# Patient Record
Sex: FEMALE | Race: WHITE | NOT HISPANIC OR LATINO | Employment: FULL TIME | URBAN - METROPOLITAN AREA
[De-identification: names, ages, dates, MRNs, and addresses within clinical notes are randomized per-mention and may not be internally consistent; named-entity substitution may affect disease eponyms.]

---

## 2018-04-25 ENCOUNTER — COMMUNICATION - HEALTHEAST (OUTPATIENT)
Dept: SURGERY | Facility: CLINIC | Age: 53
End: 2018-04-25

## 2018-06-14 ENCOUNTER — HOSPITAL ENCOUNTER (OUTPATIENT)
Dept: MAMMOGRAPHY | Facility: CLINIC | Age: 53
Discharge: HOME | End: 2018-06-14
Attending: SPECIALIST

## 2018-06-14 DIAGNOSIS — Z12.31 VISIT FOR SCREENING MAMMOGRAM: ICD-10-CM

## 2018-07-27 ENCOUNTER — RECORDS - HEALTHEAST (OUTPATIENT)
Dept: ADMINISTRATIVE | Facility: OTHER | Age: 53
End: 2018-07-27

## 2018-07-27 ENCOUNTER — AMBULATORY - HEALTHEAST (OUTPATIENT)
Dept: SCHEDULING | Facility: CLINIC | Age: 53
End: 2018-07-27

## 2018-07-27 DIAGNOSIS — Z13.820 OSTEOPOROSIS SCREENING: ICD-10-CM

## 2018-08-15 ENCOUNTER — RECORDS - HEALTHEAST (OUTPATIENT)
Dept: ADMINISTRATIVE | Facility: OTHER | Age: 53
End: 2018-08-15

## 2018-08-16 ENCOUNTER — RECORDS - HEALTHEAST (OUTPATIENT)
Dept: ADMINISTRATIVE | Facility: OTHER | Age: 53
End: 2018-08-16

## 2018-12-18 ENCOUNTER — COMMUNICATION - HEALTHEAST (OUTPATIENT)
Dept: SURGERY | Facility: CLINIC | Age: 53
End: 2018-12-18

## 2019-03-15 ENCOUNTER — AMBULATORY - HEALTHEAST (OUTPATIENT)
Dept: SURGERY | Facility: CLINIC | Age: 54
End: 2019-03-15

## 2019-06-18 ENCOUNTER — HOSPITAL ENCOUNTER (OUTPATIENT)
Dept: MAMMOGRAPHY | Facility: CLINIC | Age: 54
Discharge: HOME | End: 2019-06-18
Attending: SPECIALIST

## 2019-06-18 DIAGNOSIS — Z12.31 VISIT FOR SCREENING MAMMOGRAM: ICD-10-CM

## 2020-04-01 ENCOUNTER — OFFICE VISIT - HEALTHEAST (OUTPATIENT)
Dept: SURGERY | Facility: CLINIC | Age: 55
End: 2020-04-01

## 2020-04-03 ENCOUNTER — OFFICE VISIT - HEALTHEAST (OUTPATIENT)
Dept: SURGERY | Facility: CLINIC | Age: 55
End: 2020-04-03

## 2020-04-13 ENCOUNTER — OFFICE VISIT - HEALTHEAST (OUTPATIENT)
Dept: SURGERY | Facility: CLINIC | Age: 55
End: 2020-04-13

## 2020-04-13 DIAGNOSIS — Z48.89 POSTOPERATIVE VISIT: ICD-10-CM

## 2020-07-28 ENCOUNTER — RECORDS - HEALTHEAST (OUTPATIENT)
Dept: ADMINISTRATIVE | Facility: OTHER | Age: 55
End: 2020-07-28

## 2020-07-28 ENCOUNTER — AMBULATORY - HEALTHEAST (OUTPATIENT)
Dept: SCHEDULING | Facility: CLINIC | Age: 55
End: 2020-07-28

## 2020-07-28 ENCOUNTER — AMBULATORY - HEALTHEAST (OUTPATIENT)
Dept: SURGERY | Facility: CLINIC | Age: 55
End: 2020-07-28

## 2020-07-28 DIAGNOSIS — Z13.1 SCREENING FOR DIABETES MELLITUS: ICD-10-CM

## 2020-07-28 DIAGNOSIS — M81.0 OSTEOPOROSIS, POST-MENOPAUSAL: ICD-10-CM

## 2020-07-28 DIAGNOSIS — M81.0 OSTEOPOROSIS, UNSPECIFIED OSTEOPOROSIS TYPE, UNSPECIFIED PATHOLOGICAL FRACTURE PRESENCE: ICD-10-CM

## 2020-07-28 DIAGNOSIS — Z13.220 SCREENING FOR LIPOID DISORDERS: ICD-10-CM

## 2020-07-28 DIAGNOSIS — D56.3 THALASSEMIA MINOR: ICD-10-CM

## 2020-07-29 ENCOUNTER — AMBULATORY - HEALTHEAST (OUTPATIENT)
Dept: LAB | Facility: CLINIC | Age: 55
End: 2020-07-29

## 2020-07-29 DIAGNOSIS — D56.3 THALASSEMIA MINOR: ICD-10-CM

## 2020-07-29 DIAGNOSIS — M81.0 OSTEOPOROSIS, POST-MENOPAUSAL: ICD-10-CM

## 2020-07-29 DIAGNOSIS — Z13.1 SCREENING FOR DIABETES MELLITUS: ICD-10-CM

## 2020-07-29 DIAGNOSIS — Z13.220 SCREENING FOR LIPOID DISORDERS: ICD-10-CM

## 2020-07-29 LAB
CHOLEST SERPL-MCNC: 168 MG/DL
ERYTHROCYTE [DISTWIDTH] IN BLOOD BY AUTOMATED COUNT: 14.9 % (ref 11–14.5)
FASTING STATUS PATIENT QL REPORTED: YES
FASTING STATUS PATIENT QL REPORTED: YES
GLUCOSE BLD-MCNC: 81 MG/DL (ref 70–125)
HCT VFR BLD AUTO: 36.8 % (ref 35–47)
HDLC SERPL-MCNC: 56 MG/DL
HGB BLD-MCNC: 11.9 G/DL (ref 12–16)
LDLC SERPL CALC-MCNC: 100 MG/DL
MCH RBC QN AUTO: 21.1 PG (ref 27–34)
MCHC RBC AUTO-ENTMCNC: 32.3 G/DL (ref 32–36)
MCV RBC AUTO: 66 FL (ref 80–100)
PLATELET # BLD AUTO: 180 THOU/UL (ref 140–440)
PMV BLD AUTO: 10.1 FL (ref 7–10)
RBC # BLD AUTO: 5.61 MILL/UL (ref 3.8–5.4)
TRIGL SERPL-MCNC: 61 MG/DL
WBC: 4.1 THOU/UL (ref 4–11)

## 2020-07-30 LAB — 25(OH)D3 SERPL-MCNC: 46.7 NG/ML (ref 30–80)

## 2020-08-13 ENCOUNTER — HOSPITAL ENCOUNTER (OUTPATIENT)
Dept: MAMMOGRAPHY | Facility: CLINIC | Age: 55
Discharge: HOME | End: 2020-08-13
Attending: FAMILY MEDICINE

## 2020-08-13 DIAGNOSIS — Z12.31 VISIT FOR SCREENING MAMMOGRAM: ICD-10-CM

## 2020-12-17 ENCOUNTER — AMBULATORY - HEALTHEAST (OUTPATIENT)
Dept: SURGERY | Facility: CLINIC | Age: 55
End: 2020-12-17

## 2020-12-17 DIAGNOSIS — Z48.89 POSTOPERATIVE VISIT: ICD-10-CM

## 2020-12-22 ENCOUNTER — AMBULATORY - HEALTHEAST (OUTPATIENT)
Dept: LAB | Facility: CLINIC | Age: 55
End: 2020-12-22

## 2020-12-22 DIAGNOSIS — Z48.89 POSTOPERATIVE VISIT: ICD-10-CM

## 2020-12-22 LAB — HGB BLD-MCNC: 12 G/DL (ref 12–16)

## 2021-05-27 ENCOUNTER — RECORDS - HEALTHEAST (OUTPATIENT)
Dept: ADMINISTRATIVE | Facility: CLINIC | Age: 56
End: 2021-05-27

## 2021-05-28 ENCOUNTER — RECORDS - HEALTHEAST (OUTPATIENT)
Dept: ADMINISTRATIVE | Facility: CLINIC | Age: 56
End: 2021-05-28

## 2021-07-13 ENCOUNTER — RECORDS - HEALTHEAST (OUTPATIENT)
Dept: ADMINISTRATIVE | Facility: CLINIC | Age: 56
End: 2021-07-13

## 2021-07-14 ENCOUNTER — TELEPHONE (OUTPATIENT)
Dept: SURGERY | Facility: CLINIC | Age: 56
End: 2021-07-14

## 2021-07-21 ENCOUNTER — RECORDS - HEALTHEAST (OUTPATIENT)
Dept: ADMINISTRATIVE | Facility: CLINIC | Age: 56
End: 2021-07-21

## 2021-08-12 ENCOUNTER — E-VISIT (OUTPATIENT)
Dept: URGENT CARE | Facility: CLINIC | Age: 56
End: 2021-08-12
Payer: COMMERCIAL

## 2021-08-12 DIAGNOSIS — Z20.822 SUSPECTED COVID-19 VIRUS INFECTION: ICD-10-CM

## 2021-08-12 DIAGNOSIS — Z20.822 SUSPECTED COVID-19 VIRUS INFECTION: Primary | ICD-10-CM

## 2021-08-12 LAB — SARS-COV-2 RNA RESP QL NAA+PROBE: NEGATIVE

## 2021-08-12 PROCEDURE — U0003 INFECTIOUS AGENT DETECTION BY NUCLEIC ACID (DNA OR RNA); SEVERE ACUTE RESPIRATORY SYNDROME CORONAVIRUS 2 (SARS-COV-2) (CORONAVIRUS DISEASE [COVID-19]), AMPLIFIED PROBE TECHNIQUE, MAKING USE OF HIGH THROUGHPUT TECHNOLOGIES AS DESCRIBED BY CMS-2020-01-R: HCPCS

## 2021-08-12 PROCEDURE — U0005 INFEC AGEN DETEC AMPLI PROBE: HCPCS

## 2021-08-12 PROCEDURE — 99421 OL DIG E/M SVC 5-10 MIN: CPT | Performed by: PHYSICIAN ASSISTANT

## 2021-08-12 NOTE — PATIENT INSTRUCTIONS
Dear Emilia Mccormick,    Your symptoms show that you may have coronavirus (COVID-19). This illness can cause fever, cough and trouble breathing. Many people get a mild case and get better on their own. Some people can get very sick.    Will I be tested for COVID-19?  We would like to test you for Covid-19 virus. I have placed orders for this test.     For all employees or close contacts (except [place] and [facility] - see below), go to your [employer] home page and scroll down to the section that says  You have an appointment that needs to be scheduled  and click the large green button that says  Schedule Now  and follow the steps to find the next available opening.     If you are unable to complete these steps or if you cannot find any available times, please call [phone] to schedule employee testing.     [place] employees or close contacts, please call [phone].   [place] ([facility]) employees or close contacts call [phone].    Return to work/school/ guidance:  Please let your workplace manager and staffing office know when your quarantine ends     We can t give you an exact date as it depends on the above. You can calculate this on your own or work with your manager/staffing office to calculate this. (For example if you were exposed on 10/4, you would have to quarantine for 14 full days. That would be through 10/18. You could return on 10/19.)      If you receive a positive COVID-19 test result, follow the guidance of the those who are giving you the results. Usually the return to work is 10 (or in some cases 20 days from symptom onset.) If you work at [employer], you must also be cleared by Employee Occupational Health and Safety to return to work.        If you receive a negative COVID-19 test result and did not have a high risk exposure to someone with a known positive COVID-19 test, you can return to work once you're free of fever for 24 hours without fever-reducing  medication and your symptoms are improving or resolved.      If you receive a negative COVID-19 test and If you had a high risk exposure to someone who has tested positive for COVID-19 then you can return to work 14 days after your last contact with the positive individual    Note: If you have ongoing exposure to the covid positive person, this quarantine period may be more than 14 days. (For example, if you are continued to be exposed to your child who tested positive and cannot isolate from them, then the quarantine of 7-14 days can't start until your child is no longer contagious. This is typically 10 days from onset of the child's symptoms. So the total duration may be 17-24 days in this case.)    Sign up for [employer].   We know it's scary to hear that you might have COVID-19. We want to track your symptoms to make sure you're okay over the next 2 weeks. Please look for an email from [employer]--this is a free, online program that we'll use to keep in touch. To sign up, follow the link in the email you will receive. Learn more at [email or web address]    How can I take care of myself?    Get lots of rest. Drink extra fluids (unless a doctor has told you not to)    Take Tylenol (acetaminophen) or ibuprofen for fever or pain. If you have liver or kidney problems, ask your family doctor if it's okay to take Tylenol o ibuprofen    If you have other health problems (like cancer, heart failure, an organ transplant or severe kidney disease): Call your specialty clinic if you don't feel better in the next 2 days.    Know when to call 911. Emergency warning signs include:  o Trouble breathing or shortness of breath  o Pain or pressure in the chest that doesn't go away  o Feeling confused like you haven't felt before, or not being able to wake up  o Bluish-colored lips or face    Where can I get more information?  [facility] - About COVID-19:   [email or web address]    CDC - What to Do  If You're Sick:   www.cdc.gov/coronavirus/2019-ncov/about/steps-when-sick.html

## 2021-08-21 ENCOUNTER — HEALTH MAINTENANCE LETTER (OUTPATIENT)
Age: 56
End: 2021-08-21

## 2021-10-16 ENCOUNTER — HEALTH MAINTENANCE LETTER (OUTPATIENT)
Age: 56
End: 2021-10-16

## 2021-12-26 ENCOUNTER — E-VISIT (OUTPATIENT)
Dept: URGENT CARE | Facility: CLINIC | Age: 56
End: 2021-12-26
Payer: COMMERCIAL

## 2021-12-26 DIAGNOSIS — Z20.822 CLOSE EXPOSURE TO 2019 NOVEL CORONAVIRUS: Primary | ICD-10-CM

## 2021-12-26 PROCEDURE — 99421 OL DIG E/M SVC 5-10 MIN: CPT | Performed by: PHYSICIAN ASSISTANT

## 2021-12-28 ENCOUNTER — LAB (OUTPATIENT)
Dept: FAMILY MEDICINE | Facility: CLINIC | Age: 56
End: 2021-12-28
Attending: PHYSICIAN ASSISTANT
Payer: COMMERCIAL

## 2021-12-28 DIAGNOSIS — Z20.822 CLOSE EXPOSURE TO 2019 NOVEL CORONAVIRUS: ICD-10-CM

## 2021-12-28 LAB — SARS-COV-2 RNA RESP QL NAA+PROBE: NEGATIVE

## 2021-12-28 PROCEDURE — U0003 INFECTIOUS AGENT DETECTION BY NUCLEIC ACID (DNA OR RNA); SEVERE ACUTE RESPIRATORY SYNDROME CORONAVIRUS 2 (SARS-COV-2) (CORONAVIRUS DISEASE [COVID-19]), AMPLIFIED PROBE TECHNIQUE, MAKING USE OF HIGH THROUGHPUT TECHNOLOGIES AS DESCRIBED BY CMS-2020-01-R: HCPCS

## 2021-12-28 PROCEDURE — U0005 INFEC AGEN DETEC AMPLI PROBE: HCPCS

## 2022-01-14 ENCOUNTER — LAB REQUISITION (OUTPATIENT)
Dept: LAB | Facility: HOSPITAL | Age: 57
End: 2022-01-14

## 2022-01-14 PROCEDURE — U0003 INFECTIOUS AGENT DETECTION BY NUCLEIC ACID (DNA OR RNA); SEVERE ACUTE RESPIRATORY SYNDROME CORONAVIRUS 2 (SARS-COV-2) (CORONAVIRUS DISEASE [COVID-19]), AMPLIFIED PROBE TECHNIQUE, MAKING USE OF HIGH THROUGHPUT TECHNOLOGIES AS DESCRIBED BY CMS-2020-01-R: HCPCS | Performed by: INTERNAL MEDICINE

## 2022-01-15 LAB — SARS-COV-2 RNA RESP QL NAA+PROBE: POSITIVE

## 2022-01-26 NOTE — PROGRESS NOTES
ASSESSMENT: Emilia Mccormick is a 56 year old female with past medical history significant for osteoporosis who presents today for new patient evaluation of chronic left neck pain without radicular symptoms.  Patient has not had any imaging of the cervical spine.  Suspect she may have left upper cervical facet arthropathy.  Pain has been persistent despite conservative treatment including NSAIDs, massage, and working with the stretch lab.  She is neurologically intact.  -Patient also complains of positional dizziness which sounds most consistent with BPPV.  Suspect this is unrelated to the cervical spine pain.      PLAN:  A shared decision making model was used.  The patient's values and choices were respected.  The following represents what was discussed and decided upon by the physician assistant and the patient.      1.  DIAGNOSTIC TESTS: I ordered an MRI cervical spine for further evaluation.  Pain has been present for about 6 months and persists despite conservative treatment including NSAIDs, massage, stretch lab therapy.    2.  PHYSICAL THERAPY:    -I entered a referral to occupational therapy for her vertigo.  -This patient will likely also benefit from a referral to physical therapy for her neck pain.  I think she would be a good candidate for [facility].  We will await the results of her MRI first.    3.  MEDICATIONS: No changes are made to the patient's medications.  She uses ibuprofen rarely for pain.  She does not feel that she needs any additional pain relieving medications.    4.  INTERVENTIONS: No interventions were ordered.  Patient may benefit from interventional pain management if she fails to improve with conservative treatment, depending on the results of an MRI.    5.  PATIENT EDUCATION: Patient is in agreement the above plan.  All questions were answered.    6.  FOLLOW-UP:   I will send the patient a [employer] message with her MRI results.  At that time I will likely recommend [facility] physical  therapy versus returning to the clinic to review the results and discuss treatment options.  If she has questions or concerns in the meantime, she should not hesitate to call.        SUBJECTIVE:  Emilia Mccormick  Is a 56 year old female who presents today for new patient evaluation of neck pain.  Patient reports that she began to have neck pain about 6 months ago.  She denies any specific injury or event to cause the neck pain.  Patient is an avid weight  for the past 10 years.  She assumed the pain began because she does not always stretch as much as she should.  The pain first began while she was driving.  She could not turn her neck.  The pain persisted so she started getting massage which was helpful.  For the past 1 month she has been seeing the stress lab which has helped significantly to improve her range of motion, but pain persists.  Patient would like to find out what is wrong so that she does not injure herself further while she continues to lift weights.    Patient complains of left-sided neck pain.  Pain is located in the upper cervical spine.  She denies any headaches associated with the pain.  She denies any pain radiating into the shoulders or down the arms.  Pain is aggravated movement, especially with turning her neck to the right.  Pain is alleviated with stretching and applying heat.  She denies any numbness, tingling, weakness down the arms.  Denies loss of bowel or bladder control.  Denies any recent fevers, chills, sweats.    Patient also complains of dizziness.  She thinks this began at around the same time as her neck pain.  She states the dizziness is positional.  It occurs when rolling over and sometimes when changing positions during weight lifting (getting up and down off of the bench).  She states it feels like the room is spinning.  It resolves if she sits still for a moment.  She denies nausea or vomiting associated with the dizziness.    As mentioned above, patient has  tried massage and working with the stretch lab.  She has had chiropractic treatment 2 years ago (not for this pain).  She has never had any spine surgeries or spine injections.  She did have a shoulder injection for bursitis several years ago which was very helpful.  She takes Advil rarely (more for headache).  She is not sure if it helps with her neck pain.    Current Outpatient Medications   Medication     calcium carbonate-vitamin D2 500 mg(1,250mg) -200 unit tablet     multivitamin (ONE A DAY) per tablet         Allergies   Allergen Reactions     Lexapro [Escitalopram] Unknown     Penicillins Unknown       Past Medical History:   Diagnosis Date     Kidney stone      Osteoporosis      Thalassemia         Patient Active Problem List   Diagnosis     Idiopathic osteoporosis     Calculus of kidney       Past Surgical History:   Procedure Laterality Date     ENDOMETRIAL ABLATION       THYROID SURGERY  2000    lobectomy     TUBAL LIGATION       URETEROSCOPY         Family History   Problem Relation Age of Onset     Breast Cancer Paternal Aunt 70.00     Cancer Father         skin     Heart Disease Father      Cancer Paternal Grandfather         lung     Urolithiasis No family hx of      Clotting Disorder No family hx of      Diabetes No family hx of      Gout No family hx of        Social history: Patient is .  She works as a manager for [employer].  She uses nicotine replacement lozenges.  She drinks alcohol about once per week.  Denies illicit drug use.    ROS: Positive for joint pain, itching, dizziness, insomnia.  Specifically negative for dysphagia, imbalance, fine motor skill difficulties, bowel/bladder dysfunction, fevers,chills, appetite changes, unexplained weight loss.   Otherwise 13 systems reviewed are negative.  Please see the patient's intake questionnaire from today for details.      OBJECTIVE:  PHYSICAL EXAMINATION:  CONSTITUTIONAL:  Vital signs as above.  No acute  distress.  The patient is well nourished and well groomed.  PSYCHIATRIC:  The patient is awake, alert, oriented to person, place, time and answering questions appropriately with clear speech.    HEENT:  Normocephalic, atraumatic.  Sclera clear.    SKIN:  Skin over the face, bilateral upper extremities, and neck is clean, dry, intact without rashes.  LYMPH NODES:  No palpable or tender anterior/posterior cervical, submandibular, or supraclavicular lymph nodes.    MUSCLE STRENGTH:  5/5 strength for the bilateral shoulder abductors, elbow flexors/extensors, wrist extensors, finger flexors/abductors.  NEURO:  CN III-XII are grossly intact.  2/4 symmetric biceps, brachioradialis, triceps reflexes bilaterally.  Sensation to light touch intact bilateral upper extremities throughout.  Negative Paula's bilaterally.    VASCULAR:  2/4 radial pulses bilaterally.  Warm upper limbs bilaterally.    MUSCULOSKELETAL: Tenderness to palpation left cervical paraspinous muscles at approximately C2 with some hypertonicity.  No significant tenderness palpation left occipital nerve.  Cervical range of motion is intact with flexion and extension.  Moderately restricted lateral rotation right, mildly restricted lateral rotation left.

## 2022-01-28 ENCOUNTER — OFFICE VISIT (OUTPATIENT)
Dept: PHYSICAL MEDICINE AND REHAB | Facility: CLINIC | Age: 57
End: 2022-01-28
Payer: COMMERCIAL

## 2022-01-28 VITALS
TEMPERATURE: 98.2 F | DIASTOLIC BLOOD PRESSURE: 74 MMHG | SYSTOLIC BLOOD PRESSURE: 113 MMHG | WEIGHT: 119.4 LBS | BODY MASS INDEX: 23.44 KG/M2 | HEIGHT: 60 IN | HEART RATE: 73 BPM

## 2022-01-28 DIAGNOSIS — M54.2 CERVICAL SPINE PAIN: Primary | ICD-10-CM

## 2022-01-28 DIAGNOSIS — H81.10 BENIGN PAROXYSMAL POSITIONAL VERTIGO, UNSPECIFIED LATERALITY: ICD-10-CM

## 2022-01-28 PROCEDURE — 99204 OFFICE O/P NEW MOD 45 MIN: CPT | Performed by: PHYSICIAN ASSISTANT

## 2022-01-28 ASSESSMENT — MIFFLIN-ST. JEOR: SCORE: 1043.09

## 2022-01-28 ASSESSMENT — PAIN SCALES - GENERAL: PAINLEVEL: NO PAIN (1)

## 2022-01-28 NOTE — LETTER
1/28/2022         RE: Emilia Mccormick  [address] [place] [address]        Dear Colleague,    Thank you for referring your patient, Emilia Mccormick, to the [facility] [place]. Please see a copy of my visit note below.    ASSESSMENT: Emilia Mccormick is a 56 year old female with past medical history significant for osteoporosis who presents today for new patient evaluation of chronic left neck pain without radicular symptoms.  Patient has not had any imaging of the cervical spine.  Suspect she may have left upper cervical facet arthropathy.  Pain has been persistent despite conservative treatment including NSAIDs, massage, and working with the stretch lab.  She is neurologically intact.  -Patient also complains of positional dizziness which sounds most consistent with BPPV.  Suspect this is unrelated to the cervical spine pain.      PLAN:  A shared decision making model was used.  The patient's values and choices were respected.  The following represents what was discussed and decided upon by the physician assistant and the patient.      1.  DIAGNOSTIC TESTS: I ordered an MRI cervical spine for further evaluation.  Pain has been present for about 6 months and persists despite conservative treatment including NSAIDs, massage, stretch lab therapy.    2.  PHYSICAL THERAPY:    -I entered a referral to occupational therapy for her vertigo.  -This patient will likely also benefit from a referral to physical therapy for her neck pain.  I think she would be a good candidate for [facility].  We will await the results of her MRI first.    3.  MEDICATIONS: No changes are made to the patient's medications.  She uses ibuprofen rarely for pain.  She does not feel that she needs any additional pain relieving medications.    4.  INTERVENTIONS: No interventions were ordered.  Patient may benefit from interventional pain management if she fails to improve with conservative treatment,  depending on the results of an MRI.    5.  PATIENT EDUCATION: Patient is in agreement the above plan.  All questions were answered.    6.  FOLLOW-UP:   I will send the patient a [employer] message with her MRI results.  At that time I will likely recommend [facility] physical therapy versus returning to the clinic to review the results and discuss treatment options.  If she has questions or concerns in the meantime, she should not hesitate to call.        SUBJECTIVE:  Emilia Mccormick  Is a 56 year old female who presents today for new patient evaluation of neck pain.  Patient reports that she began to have neck pain about 6 months ago.  She denies any specific injury or event to cause the neck pain.  Patient is an avid weight  for the past 10 years.  She assumed the pain began because she does not always stretch as much as she should.  The pain first began while she was driving.  She could not turn her neck.  The pain persisted so she started getting massage which was helpful.  For the past 1 month she has been seeing the stress lab which has helped significantly to improve her range of motion, but pain persists.  Patient would like to find out what is wrong so that she does not injure herself further while she continues to lift weights.    Patient complains of left-sided neck pain.  Pain is located in the upper cervical spine.  She denies any headaches associated with the pain.  She denies any pain radiating into the shoulders or down the arms.  Pain is aggravated movement, especially with turning her neck to the right.  Pain is alleviated with stretching and applying heat.  She denies any numbness, tingling, weakness down the arms.  Denies loss of bowel or bladder control.  Denies any recent fevers, chills, sweats.    Patient also complains of dizziness.  She thinks this began at around the same time as her neck pain.  She states the dizziness is positional.  It occurs when rolling over and sometimes when  changing positions during weight lifting (getting up and down off of the bench).  She states it feels like the room is spinning.  It resolves if she sits still for a moment.  She denies nausea or vomiting associated with the dizziness.    As mentioned above, patient has tried massage and working with the stretch lab.  She has had chiropractic treatment 2 years ago (not for this pain).  She has never had any spine surgeries or spine injections.  She did have a shoulder injection for bursitis several years ago which was very helpful.  She takes Advil rarely (more for headache).  She is not sure if it helps with her neck pain.    Current Outpatient Medications   Medication     calcium carbonate-vitamin D2 500 mg(1,250mg) -200 unit tablet     multivitamin (ONE A DAY) per tablet         Allergies   Allergen Reactions     Lexapro [Escitalopram] Unknown     Penicillins Unknown       Past Medical History:   Diagnosis Date     Kidney stone      Osteoporosis      Thalassemia         Patient Active Problem List   Diagnosis     Idiopathic osteoporosis     Calculus of kidney       Past Surgical History:   Procedure Laterality Date     ENDOMETRIAL ABLATION       THYROID SURGERY  2000    lobectomy     TUBAL LIGATION       URETEROSCOPY         Family History   Problem Relation Age of Onset     Breast Cancer Paternal Aunt 70.00     Cancer Father         skin     Heart Disease Father      Cancer Paternal Grandfather         lung     Urolithiasis No family hx of      Clotting Disorder No family hx of      Diabetes No family hx of      Gout No family hx of        Social history: Patient is .  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  She uses nicotine replacement lozenges.  She drinks alcohol about once per week.  Denies illicit drug use.    ROS: Positive for joint pain, itching, dizziness, insomnia.  Specifically negative for dysphagia, imbalance, fine motor skill difficulties, bowel/bladder  dysfunction, fevers,chills, appetite changes, unexplained weight loss.   Otherwise 13 systems reviewed are negative.  Please see the patient's intake questionnaire from today for details.      OBJECTIVE:  PHYSICAL EXAMINATION:  CONSTITUTIONAL:  Vital signs as above.  No acute distress.  The patient is well nourished and well groomed.  PSYCHIATRIC:  The patient is awake, alert, oriented to person, place, time and answering questions appropriately with clear speech.    HEENT:  Normocephalic, atraumatic.  Sclera clear.    SKIN:  Skin over the face, bilateral upper extremities, and neck is clean, dry, intact without rashes.  LYMPH NODES:  No palpable or tender anterior/posterior cervical, submandibular, or supraclavicular lymph nodes.    MUSCLE STRENGTH:  5/5 strength for the bilateral shoulder abductors, elbow flexors/extensors, wrist extensors, finger flexors/abductors.  NEURO:  CN III-XII are grossly intact.  2/4 symmetric biceps, brachioradialis, triceps reflexes bilaterally.  Sensation to light touch intact bilateral upper extremities throughout.  Negative Paula's bilaterally.    VASCULAR:  2/4 radial pulses bilaterally.  Warm upper limbs bilaterally.    MUSCULOSKELETAL: Tenderness to palpation left cervical paraspinous muscles at approximately C2 with some hypertonicity.  No significant tenderness palpation left occipital nerve.  Cervical range of motion is intact with flexion and extension.  Moderately restricted lateral rotation right, mildly restricted lateral rotation left.              Again, thank you for allowing me to participate in the care of your patient.        Sincerely,        Genet Reynolds PA-C

## 2022-01-28 NOTE — PATIENT INSTRUCTIONS
[facility] Scheduling    Please call [phone] to schedule your image(s) (select option#1).     [facility]  [address] [address]      [address] [address]    An order for occupational ltherapy has been provided today.  Someone will call you to schedule physical therapy or you can call [phone] to schedule physical therapy.  It will be very important for you to do your physical therapy exercises on aregular basis to decrease your pain and prevent future flares of pain.

## 2022-02-03 ENCOUNTER — HOSPITAL ENCOUNTER (OUTPATIENT)
Dept: OCCUPATIONAL THERAPY | Facility: REHABILITATION | Age: 57
End: 2022-02-03
Attending: PHYSICIAN ASSISTANT
Payer: COMMERCIAL

## 2022-02-03 DIAGNOSIS — H81.11 BENIGN PAROXYSMAL POSITIONAL VERTIGO, RIGHT: Primary | ICD-10-CM

## 2022-02-03 DIAGNOSIS — Z78.9 IMPAIRED INSTRUMENTAL ACTIVITIES OF DAILY LIVING: ICD-10-CM

## 2022-02-03 DIAGNOSIS — R26.81 UNSTEADINESS ON FEET: ICD-10-CM

## 2022-02-03 DIAGNOSIS — H81.10 BENIGN PAROXYSMAL POSITIONAL VERTIGO, UNSPECIFIED LATERALITY: ICD-10-CM

## 2022-02-03 PROCEDURE — 95992 CANALITH REPOSITIONING PROC: CPT | Mod: GO | Performed by: OCCUPATIONAL THERAPIST

## 2022-02-03 PROCEDURE — 97165 OT EVAL LOW COMPLEX 30 MIN: CPT | Mod: GO | Performed by: OCCUPATIONAL THERAPIST

## 2022-02-03 NOTE — PROGRESS NOTES
02/03/22 0700   Quick Adds   Quick Adds Vestibular Eval   Type of Visit Initial Outpatient Occupational Therapy Evaluation   General Information   Start Of Care Date 02/03/22   Referring Physician Genet Reynolds PA-C   Orders Evaluate and treat as indicated   Orders Date 01/28/22   Medical Diagnosis BPPV   Onset of Illness/Injury or Date of Surgery 07/01/21   Precautions/Limitations No known precautions/limitations   Surgical/Medical History Reviewed Yes   Additional Occupational Profile Info/Pertinent History of Current Problem Patient reports gradual onset of vertigo with position changes in July 2021. She reports that she had similar symptoms a year prior to that and symptoms resolved after a couple of weeks. With the cuurent episode, she denies hearing changes, ear pain, headache or tinnitus.    Pain   Patient currently in pain Yes   Pain location neck   Pain rating 2/10   Fall Risk Screen   Fall screen completed by OT   Have you fallen 2 or more times in the past year? No   Have you fallen and had an injury in the past year? No   Is patient a fall risk? No   Abuse Screen (yes response referral indicated)   Feels Unsafe at Home or Work/School no   Feels Threatened by Someone no   Does Anyone Try to Keep You From Having Contact with Others or Doing Things Outside Your Home? no   Physical Signs of Abuse Present no   Sensation   Sensation Comments Patient reports normal sensation   Infrared Goggle Exam or Frenzel Lense Exam   Vestibular Suppressant in Last 24 Hours? No   Denbo-Hallpike (right) Upbeating R torsional   Denbo-Hallpike (right) Comments 3 second latency and fatigues in 40 seconds   Denbo-Hallpike (Left) Negative   BPPV Canal(s) R Posterior   BPPV Type Canalithasis   Planned Therapy Interventions   Planned Therapy Interventions Neuromuscular re-education   Intervention Comments canalith maneuver    OT Goal 1   Goal Description Patient will be able to perform bed mobility without vertigo   Target Date 05/04/22     OT Goal 2   Goal Description Patient will be able to bend to tie her shoes without vertigo   Target Date 05/04/22    OT Goal 3   Goal Description Patient will be able to look up into cupboard without vertigo   Target Date 05/04/22   Clinical Impression   Criteria for Skilled Therapeutic Interventions Met Yes, treatment indicated   OT Diagnosis right PC BPPV, unsteadiness, decreased ADL and IADL function   Clinical Decision Making (Complexity) Low complexity   Therapy Frequency once a week   Predicted Duration of Therapy Intervention (days/wks) 12 weeks   Risks and Benefits of Treatment have been explained. Yes   Patient, Family & other staff in agreement with plan of care Yes   Clinical Impression Comments Patient has right PC BPPV and was treated with right Epley maneuver today.   Education Assessment   Barriers To Learning No Barriers   Total Evaluation Time   OT Corrina Low Complexity Minutes (92079) 10

## 2022-02-07 ENCOUNTER — HOSPITAL ENCOUNTER (OUTPATIENT)
Dept: MRI IMAGING | Facility: HOSPITAL | Age: 57
Discharge: HOME | End: 2022-02-07
Attending: PHYSICIAN ASSISTANT | Admitting: PHYSICIAN ASSISTANT
Payer: COMMERCIAL

## 2022-02-07 DIAGNOSIS — M54.2 CERVICAL SPINE PAIN: ICD-10-CM

## 2022-02-07 PROCEDURE — 72141 MRI NECK SPINE W/O DYE: CPT

## 2022-03-22 ENCOUNTER — HOSPITAL ENCOUNTER (OUTPATIENT)
Dept: PHYSICAL THERAPY | Facility: CLINIC | Age: 57
Discharge: HOME | End: 2022-03-22
Attending: PHYSICIAN ASSISTANT
Payer: COMMERCIAL

## 2022-03-22 DIAGNOSIS — M47.812 ARTHROPATHY OF CERVICAL FACET JOINT: ICD-10-CM

## 2022-03-22 PROCEDURE — 97110 THERAPEUTIC EXERCISES: CPT | Mod: GP | Performed by: PHYSICAL THERAPIST

## 2022-03-22 PROCEDURE — 97161 PT EVAL LOW COMPLEX 20 MIN: CPT | Mod: GP | Performed by: PHYSICAL THERAPIST

## 2022-03-22 NOTE — PROGRESS NOTES
Lumbar MedX Initial testing    AROM (full=  0-72  lumbar)    Max Extension Torque     Flex: ext ratio (ideal 1.4:1)        Cervical MedX Initial testing   AROM (full= 0-120  cervical) 0-114   Max Extension Torque  169   Flex: ext ratio (ideal 1.4:1) 1.97:1     No time to exercise  Date    Lumbar Parameters    Top Dead Center (TDC)    Counterbalance (CB)    Seat Pad    Femur Restraint    Week/Visit    Enter Week/Visit #    Weight (lbs)    Reps (#)    Time    ROM (degrees)    Pain    Flex:Ext ratio    Cervical Parameters    Top Dead Center (TDC) 36   Counterbalance (CB) 1.3   Seat Height 493, seat pad 1   Week/Visit    Enter Week/Visit # W1V1   Weight (lbs)    Reps (#)    Time    ROM (degrees) 0-114   Pain    Flex:Ext ratio      Date 3/22/22   Exercise    Stretches: UT, LS and scalene Hold 30 secnds x 1-3 reps   Chin tuck and scapular retraction 10s X 10 reps                                             Current stretches: sidebend, supine cervical extension,   She will lift an area then stretch that area. There are trainers at her gym who walk around and correct technique.     Edu: heat versus ice, stretching at gym and around workouts. Computer ergonomics-monitor height, keyboard height, chair height

## 2022-03-25 ENCOUNTER — HOSPITAL ENCOUNTER (OUTPATIENT)
Dept: PHYSICAL THERAPY | Facility: CLINIC | Age: 57
Discharge: HOME | End: 2022-03-25
Payer: COMMERCIAL

## 2022-03-25 DIAGNOSIS — M47.812 ARTHROPATHY OF CERVICAL FACET JOINT: Primary | ICD-10-CM

## 2022-03-25 PROCEDURE — 97110 THERAPEUTIC EXERCISES: CPT | Mod: GP | Performed by: PHYSICAL THERAPIST

## 2022-03-25 NOTE — PROGRESS NOTES
Subjective: patient feeling about the same as first visit. still having pain when rolling in bed and sidebending. tolerated testing from earlier this week just fine.       Assessment: Patient tolerated medX for cervical spine well today. She had some soreness in her neck with sidebending on 4 way neck, but it seemed to loosed after a few reps.      Cervical MedX Initial testing   AROM (full= 0-120  cervical) 0-114   Max Extension Torque  169   Flex: ext ratio (ideal 1.4:1) 1.97:1     No time to exercise  Date 3/25/202    Cervical Parameters    Top Dead Center (TDC) 36   Counterbalance (CB) 1.3   Seat Height 493, seat pad 2 (changed this on 3/25/2022 ) patient felt this was more comfortable   Week/Visit    Enter Week/Visit # W1V2 (tested only on fist session)   Weight (lbs) 108   Reps (#) 15   Time    ROM (degrees) 0-114   Pain sore   Flex:Ext ratio      Date 3/25/2022  3/22/22   Exercise     Stretches: UT, LS and scalene  Hold 30 secnds x 1-3 reps   Chin tuck and scapular retraction  10s X 10 reps   Nustep or UBE (did nustep today) 2 min warm up    4 way neck 20# sidebending each direction    Repeated motions into rotation 5-10 reps    Repeated motions into sidebending 5-10 reps                                    Current stretches: sidebend, supine cervical extension,   She will lift an area then stretch that area. There are trainers at her gym who walk around and correct technique.     Edu: heat versus ice, stretching at gym and around workouts. Computer ergonomics-monitor height, keyboard height, chair height

## 2022-04-01 ENCOUNTER — OFFICE VISIT (OUTPATIENT)
Dept: PLASTIC SURGERY | Facility: AMBULATORY SURGERY CENTER | Age: 57
End: 2022-04-01
Payer: COMMERCIAL

## 2022-04-01 DIAGNOSIS — L98.8 FOREHEAD WRINKLES: Primary | ICD-10-CM

## 2022-04-01 PROCEDURE — 96999 UNLISTED SPEC DERM SVC/PX: CPT | Performed by: PLASTIC SURGERY

## 2022-04-01 NOTE — PROGRESS NOTES
Chief complaint:  Facial rhytids    History of present illness:  This is a 57 year old lady who presents for a cosmetic consultation regarding Botox injection for her forehead and periorbital rhytids.    Past medical history:  Past Medical History:   Diagnosis Date     Kidney stone      Osteoporosis      Thalassemia        Past surgical history:  Noncontributory    Allergies:  Penicillin    Medications:    Current Outpatient Medications:      calcium carbonate-vitamin D2 500 mg(1,250mg) -200 unit tablet, [CALCIUM CARBONATE-VITAMIN D2 500 MG(1,250MG) -200 UNIT TABLET] Take 1 tablet by mouth 2 (two) times a day., Disp: , Rfl:      multivitamin (ONE A DAY) per tablet, [MULTIVITAMIN (ONE A DAY) PER TABLET] Take 1 tablet by mouth daily., Disp: , Rfl:     Family history:  Noncontributory    Social History:  Former smoker, now utilizing nicotine lozenges.  Occasional alcohol    Review of systems:  General ROS: No complaints or constitutional symptoms  Skin: No complaints or symptoms   Hematologic/Lymphatic: No symptoms or complaints  Psychiatric: No symptoms or complaints  Endocrine: No excessive fatigue, no hypermetabolic symptoms reported  Respiratory ROS: No cough, shortness of breath, or wheezing  Cardiovascular ROS: No chest pain or dyspnea on exertion  Breast ROS: Denies palpable breast masses, denies nipple discharge, denies peau d'orange  Gastrointestinal ROS: No abdominal pain, nausea, diarrhea, or constipation  Musculoskeletal ROS: No recent injuries reported  Neurological ROS: No focal neurologic defects reported.      Physical exam:  There were no vitals taken for this visit.  General: Alert, cooperative, appears stated age   Skin: Skin color, texture, turgor normal, no rashes or lesions   Lymphatic: No obvious adenopathy, no swelling   Eyes: No scleral icterus, pupils equal  HENT: On the upper third of her face, there is presence of forehead rhytids compatible with activation of her frontalis muscle.  There is  also activation of her bilateral  muscles on the medial two thirds of each brow.  There is evidence of activation of her procerus muscle on her glabellar region.  There is also evidence of activation of bilateral orbicularis oculi muscle on the crows feet.  There is dermatochalasis of bilateral upper eyelids as well as minimal dermatochalasis on bilateral lower eyelids.  On the middle third of her face, there is prominence on her bilateral nasolabial folds.  On the lower third of her face, there is mild jowling bilaterally, with skin laxity in the submental region and some platysmal banding in her anterior cervical region.  Lungs: Normal respiratory effort, breath sounds equal bilaterally  Heart: Regular rate and rhythm  Breasts: Not examined  Abdomen: Soft, non-distended and non-tender to palpation  Neurologic: Grossly intact                          ASSESSMENT:    This is a 57 year old lady with forehead and periorbital rhytids secondary to activation of her frontalis, bilateral , procerus and bilateral orbicularis oculi muscles.       PLAN:   Patient is an excellent candidate for treatment of these rhytids with Botox.  I will utilize 50 units total to treat the above mentioned anatomical areas.     I have discussed with the patient that typically, the duration of the effect of the Botox is approximately 4 to 6 months.  This might vary however, depending on metabolism of the patient and previous exposure to Botox.  Patient has agreed to proceed.    Time spent with the patient 30 minutes.      Daniel Whitmore MD, FACS   Diplomate American Board of Plastic Surgery  Diplomate American Board of Surgery  [facility]  Division of Plastic & Reconstructive Surgery  Office: [phone]   4/1/2022 at 1:55 PM

## 2022-04-01 NOTE — LETTER
4/1/2022         RE: Emilia Mccormick  [address] [place] [address]        Dear Colleague,    Thank you for referring your patient, Emilia Mccormick, to the [facility]3]. Please see a copy of my visit note below.    Chief complaint:  Facial rhytids    History of present illness:  This is a 57 year old lady who presents for a cosmetic consultation regarding Botox injection for her forehead and periorbital rhytids.    Past medical history:  Past Medical History:   Diagnosis Date     Kidney stone      Osteoporosis      Thalassemia        Past surgical history:  Noncontributory    Allergies:  Penicillin    Medications:    Current Outpatient Medications:      calcium carbonate-vitamin D2 500 mg(1,250mg) -200 unit tablet, [CALCIUM CARBONATE-VITAMIN D2 500 MG(1,250MG) -200 UNIT TABLET] Take 1 tablet by mouth 2 (two) times a day., Disp: , Rfl:      multivitamin (ONE A DAY) per tablet, [MULTIVITAMIN (ONE A DAY) PER TABLET] Take 1 tablet by mouth daily., Disp: , Rfl:     Family history:  Noncontributory    Social History:  Former smoker, now utilizing nicotine lozenges.  Occasional alcohol    Review of systems:  General ROS: No complaints or constitutional symptoms  Skin: No complaints or symptoms   Hematologic/Lymphatic: No symptoms or complaints  Psychiatric: No symptoms or complaints  Endocrine: No excessive fatigue, no hypermetabolic symptoms reported  Respiratory ROS: No cough, shortness of breath, or wheezing  Cardiovascular ROS: No chest pain or dyspnea on exertion  Breast ROS: Denies palpable breast masses, denies nipple discharge, denies peau d'orange  Gastrointestinal ROS: No abdominal pain, nausea, diarrhea, or constipation  Musculoskeletal ROS: No recent injuries reported  Neurological ROS: No focal neurologic defects reported.      Physical exam:  There were no vitals taken for this visit.  General: Alert, cooperative, appears stated age   Skin: Skin color,  texture, turgor normal, no rashes or lesions   Lymphatic: No obvious adenopathy, no swelling   Eyes: No scleral icterus, pupils equal  HENT: On the upper third of her face, there is presence of forehead rhytids compatible with activation of her frontalis muscle.  There is also activation of her bilateral  muscles on the medial two thirds of each brow.  There is evidence of activation of her procerus muscle on her glabellar region.  There is also evidence of activation of bilateral orbicularis oculi muscle on the crows feet.  There is dermatochalasis of bilateral upper eyelids as well as minimal dermatochalasis on bilateral lower eyelids.  On the middle third of her face, there is prominence on her bilateral nasolabial folds.  On the lower third of her face, there is mild jowling bilaterally, with skin laxity in the submental region and some platysmal banding in her anterior cervical region.  Lungs: Normal respiratory effort, breath sounds equal bilaterally  Heart: Regular rate and rhythm  Breasts: Not examined  Abdomen: Soft, non-distended and non-tender to palpation  Neurologic: Grossly intact                          ASSESSMENT:    This is a 57 year old lady with forehead and periorbital rhytids secondary to activation of her frontalis, bilateral , procerus and bilateral orbicularis oculi muscles.       PLAN:   Patient is an excellent candidate for treatment of these rhytids with Botox.  I will utilize 50 units total to treat the above mentioned anatomical areas.     I have discussed with the patient that typically, the duration of the effect of the Botox is approximately 4 to 6 months.  This might vary however, depending on metabolism of the patient and previous exposure to Botox.  Patient has agreed to proceed.    Time spent with the patient 30 minutes.      Daniel Whitmore MD, FACS   Diplomate American Board of Plastic Surgery  Diplomate American Board of Surgery  [facility]  Physicians  Division of Plastic & Reconstructive Surgery  Office: [phone]   4/1/2022 at 1:55 PM      Botulinum Injection Procedure Note:  Cosmetic      ATTENDING STAFF SURGEON: Dr. Whitmore     Assistant: Trang Reynolds CSA    ANESTHESIA:   None    PREOPERATIVE DIAGNOSIS:   Forehead rhytides and bilateral crow's feet    LOCATION: Forehead and bilateral crow's feet    NDC number : 1808-0362-87     OPERATION/PROCEDURE:   Botulinum toxin injection in forehead and bilateral crow's feet    Dilution with 1 cc preserved sterile normal saline in a 50 Unit of Botox/ Vial.    Total units of botulinum toxin: 50    POSTOPERATIVE DIAGNOSIS:   SAME     PREPARATION:   Alcohol swab    DESCRIPTION OF OPERATION/PROCEDURE:   The nature and purpose of the procedure, associated risks including but not limited to bruising, headache or discomfort at the site(s), numbness, muscle twitching, brow or eyelid droop, headache, double vision, not enough effect or too much effect, difficulty whistling or drinking from a straw, loss of muscle tone, or infection. Possible consequences and complications, and alternative methods of treatment were explained in detail. The patient declined a personal or family history of neuromuscular disease prior to the procedure.   A signed informed operative consent was obtained.    The patient was taken to the consultation suite and properly positioned. The area to be treated was defined and confirmed by the patient and physician. The area for Botox injection was marked.    Cosmetic procedure: A total of 50 Units were injected into sites at the previously marked target areas: Forehead, bilateral corrugators, procerus and bilateral crow's feet. The patient tolerated the procedure well and there were no complications noted. Patient was given wound care instructions and will follow-up in approximately 7 days.       Daniel Whitmore MD , FACS   Diplomate American Board of Plastic Surgery  Diplomate American  Board of Surgery  [facility]  Division of Plastic & Reconstructive Surgery   Office: [phone]   4/3/2022 at 9:43 PM                 Again, thank you for allowing me to participate in the care of your patient.        Sincerely,        Daniel Whitmore MD

## 2022-04-04 NOTE — PROGRESS NOTES
Botulinum Injection Procedure Note:  Cosmetic      ATTENDING STAFF SURGEON: Dr. Whitmore     Assistant: Trang Reynolds CSA    ANESTHESIA:   None    PREOPERATIVE DIAGNOSIS:   Forehead rhytides and bilateral crow's feet    LOCATION: Forehead and bilateral crow's feet    NDC number : 8640-8214-67     OPERATION/PROCEDURE:   Botulinum toxin injection in forehead and bilateral crow's feet    Dilution with 1 cc preserved sterile normal saline in a 50 Unit of Botox/ Vial.    Total units of botulinum toxin: 50    POSTOPERATIVE DIAGNOSIS:   SAME     PREPARATION:   Alcohol swab    DESCRIPTION OF OPERATION/PROCEDURE:   The nature and purpose of the procedure, associated risks including but not limited to bruising, headache or discomfort at the site(s), numbness, muscle twitching, brow or eyelid droop, headache, double vision, not enough effect or too much effect, difficulty whistling or drinking from a straw, loss of muscle tone, or infection. Possible consequences and complications, and alternative methods of treatment were explained in detail. The patient declined a personal or family history of neuromuscular disease prior to the procedure.   A signed informed operative consent was obtained.    The patient was taken to the consultation suite and properly positioned. The area to be treated was defined and confirmed by the patient and physician. The area for Botox injection was marked.    Cosmetic procedure: A total of 50 Units were injected into sites at the previously marked target areas: Forehead, bilateral corrugators, procerus and bilateral crow's feet. The patient tolerated the procedure well and there were no complications noted. Patient was given wound care instructions and will follow-up in approximately 7 days.       Daniel Whitmore MD , FACS   Diplomate American Board of Plastic Surgery  Diplomate American Board of Surgery  [facility]  Division of Plastic & Reconstructive Surgery   Office:  [phone]   4/3/2022 at 9:43 PM

## 2022-04-05 ENCOUNTER — HOSPITAL ENCOUNTER (OUTPATIENT)
Dept: PHYSICAL THERAPY | Facility: CLINIC | Age: 57
Discharge: HOME | End: 2022-04-05
Payer: COMMERCIAL

## 2022-04-05 DIAGNOSIS — M47.812 ARTHROPATHY OF CERVICAL FACET JOINT: Primary | ICD-10-CM

## 2022-04-05 PROCEDURE — 97110 THERAPEUTIC EXERCISES: CPT | Mod: GP | Performed by: PHYSICAL THERAPIST

## 2022-04-05 NOTE — PROGRESS NOTES
Subjective: I think I am getting a little better.  I have been very compliant with my HEP and think I can turn my head further.  It still hurts, but more motion      Assessment: Patient returns for her 2nd week of MedX and feels her pain levels are starting to decrease since starting.  She liked the RT and felt more of an exercise with a seat pad in cervical 4-way. She did not experience any pain with any of the exercises today.           Cervical MedX Initial testing  3/22/22   AROM (full= 0-120  cervical) 0-114   Max Extension Torque  169   Flex: ext ratio (ideal 1.4:1) 1.97:1       Date 4/5/22 3/25/202    Cervical Parameters     Top Dead Center (TDC) 36 36   Counterbalance (CB) 1.3 1.3   Seat Height    Seat pad 2    Limit motion to first notch for osteoporosis 493 493, seat pad 2 (changed this on 3/25/2022 ) patient felt this was more comfortable   Week/Visit     Enter Week/Visit # W2V1 W1V2 (tested only on fist session)   Weight (lbs) 114 108   Reps (#) 20 15   Time 140s    ROM (degrees) 0-114 0-114   Pain  sore   Flex:Ext ratio       Date 4/5/22 3/25/2022  3/22/22   Exercise      Stretches: UT, LS and scalene   Hold 30 secnds x 1-3 reps   Chin tuck and scapular retraction   10s X 10 reps   Nustep or UBE Nu-step 3 min (did nustep today) 2 min warm up    4 way neck    Seat pad 1 Side bending:  X 22# (bilateral) 20# sidebending each direction    Repeated motions into rotation  5-10 reps    Repeated motions into sidebending  5-10 reps    Rotary Torso, 90 seconds  Seat pad 2 22# to R     PNF D2 diagonal Green tband: X 20                               Current stretches: sidebend, supine cervical extension,   She will lift an area then stretch that area. There are trainers at her gym who walk around and correct technique.     Edu: heat versus ice, stretching at gym and around workouts. Computer ergonomics-monitor height, keyboard height, chair height

## 2022-04-08 ENCOUNTER — HOSPITAL ENCOUNTER (OUTPATIENT)
Dept: PHYSICAL THERAPY | Facility: CLINIC | Age: 57
Discharge: HOME | End: 2022-04-08
Payer: COMMERCIAL

## 2022-04-08 DIAGNOSIS — M47.812 ARTHROPATHY OF CERVICAL FACET JOINT: Primary | ICD-10-CM

## 2022-04-08 PROCEDURE — 97110 THERAPEUTIC EXERCISES: CPT | Mod: GP | Performed by: PHYSICAL THERAPIST

## 2022-04-08 PROCEDURE — 97140 MANUAL THERAPY 1/> REGIONS: CPT | Mod: GP | Performed by: PHYSICAL THERAPIST

## 2022-04-08 NOTE — PROGRESS NOTES
Subjective: I think I am getting a little better.  I have been very compliant with my HEP and think I can turn my head further.  It still hurts, but more motion        Assessment: Patient returns for her 2nd week of MedX and feels her pain ROM is improving, but her pain is still there. She did not experience any pain with any of the exercises today and felt the ROM feels better following MT.          Cervical MedX Initial testing  3/22/22   AROM (full= 0-120  cervical) 0-114   Max Extension Torque  169   Flex: ext ratio (ideal 1.4:1) 1.97:1       Date 4/8/2022  4/5/22 3/25/202    Cervical Parameters      Top Dead Center (TDC) 36 36 36   Counterbalance (CB) 1.3 1.3 1.3   Seat Height    Seat pad 2    Limit motion to first notch for osteoporosis 493, seat pad 2 493 493, seat pad 2 (changed this on 3/25/2022 ) patient felt this was more comfortable   Week/Visit      Enter Week/Visit # Wk 2 V 2 W2V1 W1V2 (tested only on fist session)   Weight (lbs) 117 114 108   Reps (#) 20 20 15   Time 120s 140s    ROM (degrees) 0-114 0-114 0-114   Pain   sore   Flex:Ext ratio        Date 4/8/2022  4/5/22 3/25/2022  3/22/22   Exercise       Stretches: UT, LS and scalene    Hold 30 secnds x 1-3 reps   Chin tuck and scapular retraction    10s X 10 reps   Nustep or UBE UBE today 1.5 minutes Nu-step 3 min (did nustep today) 2 min warm up    4 way neck    Seat pad 1 Side bending:  X 22# (bilateral)  And flexion  20 reps each Side bending:  X 22# (bilateral) 20# sidebending each direction    Repeated motions into rotation   5-10 reps    Repeated motions into sidebending   5-10 reps    Rotary Torso, 90 seconds  Seat pad 2 22# to L 22# to R     PNF D2 diagonal  Green tband: X 20                                   Current stretches: sidebend, supine cervical extension,   She will lift an area then stretch that area. There are trainers at her gym who walk around and correct technique.     Edu: heat versus ice, stretching at gym and around  workouts. Computer ergonomics-monitor height, keyboard height, chair height

## 2022-04-11 ENCOUNTER — TELEPHONE (OUTPATIENT)
Dept: SURGERY | Facility: CLINIC | Age: 57
End: 2022-04-11
Payer: COMMERCIAL

## 2022-04-11 NOTE — PATIENT INSTRUCTIONS
We've received instruction to get you scheduled for   with Dr Dawn We have that arranged as follows:     Surgery Date:    Location:  [place] [address]   Arrival Time:  m (unless instructed otherwise by the pre-op nurse)    Post op Appointment:   with      Prep Instructions:     Please schedule a pre-op physical with your primary care doctor. This may be virtual or face-to-face depending on your doctors preference. Call them right away to schedule this.    PCR-Rated COVID19 testing is required within 4 days of surgery. We have this scheduled for you at     on    . Follow the specific instructions you receive by Gissell. If your test is positive, your surgery will be canceled.     Nothing to eat or drink for 8 hours before surgery unless instructed differently by the pre-op nurse.    Your surgeon prefers no blood thinners including aspirin for one week prior to surgery but you must verify this is safe for you with your prescribing doctor before stopping.     Visitor restrictions are in place due to the pandemic. One visitor is allowed for adult surgical patients. Please verify this with the pre-op nurse when they call before surgery because it is subject to change.    If you are going home the same day of surgery, you need an adult to drive you home and stay with you 24 hours after surgery.  You cannot use public transportation or medi cab services.    You will be screened for high-risk exposure to Covid-19 during the pre-op call.  We encourage you to quarantine yourself away from any Covid-19 people for 10 days before surgery to avoid possible last minute cancellations.   When you arrive to the hospital, you will again be screened for COVID19 symptoms. If you screen positive, your surgery will need to be postponed.     The community is experiencing a surge in COVID19 hospital admissions which is impacting bed availability at all hospitals. If you are being admitted overnight or  longer following surgery, please be aware that your procedure could be cancelled as a result.     We always encourage you to notify your insurance any time you have something scheduled including surgery. The number is usually right on the back of your insurance card. Please call [facility] Cost of Care at [phone] if you need pricing information.     A Pre-op call nurse will call you the day before surgery to go over more details with you to prepare for surgery.    Call our office if you have any questions! Thank you!

## 2022-04-12 ENCOUNTER — HOSPITAL ENCOUNTER (OUTPATIENT)
Dept: PHYSICAL THERAPY | Facility: CLINIC | Age: 57
Discharge: HOME | End: 2022-04-12
Payer: COMMERCIAL

## 2022-04-12 DIAGNOSIS — M47.812 ARTHROPATHY OF CERVICAL FACET JOINT: Primary | ICD-10-CM

## 2022-04-12 PROCEDURE — 97110 THERAPEUTIC EXERCISES: CPT | Mod: GP | Performed by: PHYSICAL THERAPIST

## 2022-04-12 PROCEDURE — 97140 MANUAL THERAPY 1/> REGIONS: CPT | Mod: GP | Performed by: PHYSICAL THERAPIST

## 2022-04-12 NOTE — PROGRESS NOTES
"Subjective: Things are going really well, \"I am able to turn my head farther\".  \"I have been very complaint with exercises, but didn't get a chance to do them yesterday so I feel a little more sore today.\"        Assessment: Patient returns for her 3rd week of MedX and notes ROM improving. She did not experience any pain with any of the exercises today and increased weight on all MedX machines. Pt continues to be compliant with her HEP and attributes this to be the reason why her pain is decreasing and her ROM increasing.       Cervical MedX Initial testing  3/22/22   AROM (full= 0-120  cervical) 0-114   Max Extension Torque  169   Flex: ext ratio (ideal 1.4:1) 1.97:1       Date 4/12/22 4/8/2022  4/5/22 3/25/202    Cervical Parameters       Top Dead Center (TDC) 36 36 36 36   Counterbalance (CB) 1.3 1.3 1.3 1.3   Seat Height    Seat pad 2      493, seat pad 2 493 493, seat pad 2 (changed this on 3/25/2022 ) patient felt this was more comfortable   Week/Visit       Enter Week/Visit # Wk 3 V1 Wk 2 V 2 W2V1 W1V2 (tested only on fist session)   Weight (lbs) 126 117 114 108   Reps (#) 25 20 20 15   Time 128s 120s 140s    ROM (degrees) 0-114 0-114 0-114 0-114   Pain fatigue   sore   Flex:Ext ratio         Date 4/12/22 4/8/2022  4/5/22 3/25/2022  3/22/22   Exercise        Stretches: UT, LS and scalene     Hold 30 secnds x 1-3 reps   Chin tuck and scapular retraction     10s X 10 reps   Nustep or UBE UBE 2 min UBE today 1.5 minutes Nu-step 3 min (did nustep today) 2 min warm up    4 way neck  Seat 1  Seat pad 1 SB:  26#   B X 35 Reps     flexion 26#    X 13 reps Side bending:  X 22# (bilateral)  And flexion  20 reps each Side bending:  X 22# (bilateral) 20# sidebending each direction    Repeated motions into rotation    5-10 reps    Repeated motions into sidebending    5-10 reps    Rotary Torso, 90 seconds    Seat pad 2    Limit motion to first notch due to osteoporosis 26# to R 22# to L 22# to R     PNF D2 diagonal   Green " tband: X 20                                       Current stretches: sidebend, supine cervical extension,   She will lift an area then stretch that area. There are trainers at her gym who walk around and correct technique.     Edu: heat versus ice, stretching at gym and around workouts. Computer ergonomics-monitor height, keyboard height, chair height

## 2022-04-15 ENCOUNTER — HOSPITAL ENCOUNTER (OUTPATIENT)
Dept: PHYSICAL THERAPY | Facility: CLINIC | Age: 57
Discharge: HOME | End: 2022-04-15
Payer: COMMERCIAL

## 2022-04-15 DIAGNOSIS — M47.812 ARTHROPATHY OF CERVICAL FACET JOINT: Primary | ICD-10-CM

## 2022-04-15 PROCEDURE — 97110 THERAPEUTIC EXERCISES: CPT | Mod: GP | Performed by: PHYSICAL THERAPIST

## 2022-04-15 PROCEDURE — 97140 MANUAL THERAPY 1/> REGIONS: CPT | Mod: GP | Performed by: PHYSICAL THERAPIST

## 2022-04-15 NOTE — PROGRESS NOTES
"Subjective: Things are good. \"I feel like I have more mobility than I did previously, but my pain has stayed the same\".    Rotation before AAROM: 53 degrees R; 58 degrees L  Rotation After AAROM: 58 degrees  R,  65 degrees L    Assessment: Patient returns for her 3rd week of MedX and notes ROM improving. She did not experience any pain with any of the exercises today and increased weight on all MedX machines. Pt continues to be compliant with her HEP. She noted her ROM is improving so PT re-measured her cervical rotation B and found improvement after AAROM.       Cervical MedX Initial testing  3/22/22   AROM (full= 0-120  cervical) 0-114   Max Extension Torque  169   Flex: ext ratio (ideal 1.4:1) 1.97:1       Date 4/15/2022  4/12/22 4/8/2022  4/5/22 3/25/202    Cervical Parameters        Top Dead Center (TDC) 36 36 36 36 36   Counterbalance (CB) 1.3 1.3 1.3 1.3 1.3   Seat Height    Seat pad 2     493, seat pad 2  493, seat pad 2 493 493, seat pad 2 (changed this on 3/25/2022 ) patient felt this was more comfortable   Week/Visit        Enter Week/Visit # Wk 3 V 2 Wk 3 V1 Wk 2 V 2 W2V1 W1V2 (tested only on fist session)   Weight (lbs) 129 126 117 114 108   Reps (#) 24 25 20 20 15   Time  128s 120s 140s    ROM (degrees) 0-114 0-114 0-114 0-114 0-114   Pain  fatigue   sore   Flex:Ext ratio          Date 4/15/2022  4/12/22 4/8/2022  4/5/22 3/25/2022  3/22/22   Exercise         Stretches: UT, LS and scalene      Hold 30 secnds x 1-3 reps   Chin tuck and scapular retraction      10s X 10 reps   Nustep or UBE UBE 2 min UBE 2 min UBE today 1.5 minutes Nu-step 3 min (did nustep today) 2 min warm up    4 way neck  Seat 1  Seat pad 1 SB:  30#   B X 20 Reps     flexion 26#    X 11 reps SB:  26#   B X 35 Reps     flexion 26#    X 13 reps Side bending:  X 22# (bilateral)  And flexion  20 reps each Side bending:  X 22# (bilateral) 20# sidebending each direction    Repeated motions into rotation Reviewed today and added a towel to " assist with HEP x 15 reps    5-10 reps    Repeated motions into sidebending     5-10 reps    Rotary Torso, 90 seconds    Seat pad 2    Limit motion to first notch due to osteoporosis 28# to L 26# to R 22# to L 22# to R     PNF D2 diagonal    Green tband: X 20     SNAGS Ed on how to perform at home                                     Current stretches: sidebend, supine cervical extension,   She will lift an area then stretch that area. There are trainers at her gym who walk around and correct technique.     Edu: heat versus ice, stretching at gym and around workouts. Computer ergonomics-monitor height, keyboard height, chair height

## 2022-04-19 ENCOUNTER — HOSPITAL ENCOUNTER (OUTPATIENT)
Dept: PHYSICAL THERAPY | Facility: CLINIC | Age: 57
Discharge: HOME | End: 2022-04-19
Payer: COMMERCIAL

## 2022-04-19 PROCEDURE — 97140 MANUAL THERAPY 1/> REGIONS: CPT | Mod: GP

## 2022-04-19 PROCEDURE — 97110 THERAPEUTIC EXERCISES: CPT | Mod: GP

## 2022-04-19 NOTE — PROGRESS NOTES
Subjective: This weekend was good and I was able to do my exercises every day but one.       Assessment: Patient returns for her 4th week of MedX. She experienced some discomfort during the rotary torso and neck flexion, so PT maintained weight on both. 6 reps into neck flexion, pt reported more discomfort even after positioning adjustment so did not continue with it this session. May want to watch pt discomfort on rotary torso next session, may want to discharge if no longer beneficial. Pt continues to be compliant with her HEP. Pt reported feeling relief of pain after MT today.       Cervical MedX Initial testing  3/22/22   AROM (full= 0-120  cervical) 0-114   Max Extension Torque  169   Flex: ext ratio (ideal 1.4:1) 1.97:1       Date 4/19/2022 4/15/2022  4/12/22 4/8/2022  4/5/22 3/25/202    Cervical Parameters         Top Dead Center (TDC) 36 36 36 36 36 36   Counterbalance (CB) 1.3 1.3 1.3 1.3 1.3 1.3   Seat Height    Seat pad 2     493, seat pad 2 493, seat pad 2  493, seat pad 2 493 493, seat pad 2 (changed this on 3/25/2022 ) patient felt this was more comfortable   Week/Visit         Enter Week/Visit # Wk 4 V1 Wk 3 V 2 Wk 3 V1 Wk 2 V 2 W2V1 W1V2 (tested only on fist session)   Weight (lbs) 138 129 126 117 114 108   Reps (#) 26 24 25 20 20 15   Time 163  128s 120s 140s    ROM (degrees) 0-114 0-114 0-114 0-114 0-114 0-114   Pain fatigue  fatigue   sore   Flex:Ext ratio           Date 4/19/2022 4/15/2022  4/12/22 4/8/2022  4/5/22 3/25/2022  3/22/22   Exercise          Stretches: UT, LS and scalene       Hold 30 secnds x 1-3 reps   Chin tuck and scapular retraction       10s X 10 reps   Nustep or UBE UBE  2 min UBE 2 min UBE 2 min UBE today 1.5 minutes Nu-step 3 min (did nustep today) 2 min warm up    4 way neck  Seat 1  Seat pad 1 SB:  32#   B X 20 Reps    Flexion 26# x 6 reps SB:  30#   B X 20 Reps     flexion 26#    X 11 reps SB:  26#   B X 35 Reps     flexion 26#    X 13 reps Side bending:  X 22#  (bilateral)  And flexion  20 reps each Side bending:  X 22# (bilateral) 20# sidebending each direction    Repeated motions into rotation  Reviewed today and added a towel to assist with HEP x 15 reps    5-10 reps    Repeated motions into sidebending      5-10 reps    Rotary Torso, 90 seconds    Seat pad 2    Limit motion to first notch due to osteoporosis 28# to R     28# to L 26# to R 22# to L 22# to R     PNF D2 diagonal     Green tband: X 20     SNAGS  Ed on how to perform at home                                        Current stretches: sidebend, supine cervical extension,   She will lift an area then stretch that area. There are trainers at her gym who walk around and correct technique.     Edu: heat versus ice, stretching at gym and around workouts. Computer ergonomics-monitor height, keyboard height, chair height    Direct supervision provided; direct patient contact provided; therapy services provided with the co-signing licensed therapist guiding and directing the services as well as providing skilled judgement and assessment throughout the session.     Annie Leger, PT  [facility] Mel[facility], SPT

## 2022-04-22 ENCOUNTER — HOSPITAL ENCOUNTER (OUTPATIENT)
Dept: PHYSICAL THERAPY | Facility: CLINIC | Age: 57
Discharge: HOME | End: 2022-04-22
Payer: COMMERCIAL

## 2022-04-22 DIAGNOSIS — M54.2 CERVICAL PAIN (NECK): Primary | ICD-10-CM

## 2022-04-22 PROCEDURE — 97110 THERAPEUTIC EXERCISES: CPT | Mod: GP

## 2022-04-22 PROCEDURE — 97140 MANUAL THERAPY 1/> REGIONS: CPT | Mod: GP

## 2022-04-22 NOTE — PROGRESS NOTES
Subjective: I have had really bad soreness since the last visit. I think I was positioned funny on the 4 way neck machine.      Assessment: Patient returns for her 4th week of MedX. We held testing on MedX today d/t her having increased soreness from 3 days ago. Patient will be tested next week on cervical medX. She will start 1 time per week next week to continue to progress her exercise tolerance and ROM. MT continued today for pain relief, pt noted muscle relaxation after session.       Cervical MedX Initial testing  3/22/22   AROM (full= 0-120  cervical) 0-114   Max Extension Torque  169   Flex: ext ratio (ideal 1.4:1) 1.97:1       Date 4/22/2022 4/19/2022 4/15/2022  4/12/22 4/8/2022  4/5/22 3/25/202    Cervical Parameters          Top Dead Center (TDC) 36 36 36 36 36 36 36   Counterbalance (CB) 1.3 1.3 1.3 1.3 1.3 1.3 1.3   Seat Height    Seat pad 2     493, seat pad 2 493, seat pad 2 493, seat pad 2  493, seat pad 2 493 493, seat pad 2 (changed this on 3/25/2022 ) patient felt this was more comfortable   Week/Visit          Enter Week/Visit # Wk 4 V2 Wk 4 V1 Wk 3 V 2 Wk 3 V1 Wk 2 V 2 W2V1 W1V2 (tested only on fist session)   Weight (lbs) 141 138 129 126 117 114 108   Reps (#) 14 26 24 25 20 20 15   Time 70 163  128s 120s 140s    ROM (degrees) 0-114 0-114 0-114 0-114 0-114 0-114 0-114   Pain fatigue fatigue  fatigue   sore   Flex:Ext ratio            Date 4/22/2022 4/19/2022 4/15/2022  4/12/22 4/8/2022  4/5/22 3/25/2022  3/22/22   Exercise           Stretches: UT, LS and scalene        Hold 30 secnds x 1-3 reps   Chin tuck and scapular retraction        10s X 10 reps   Nustep or UBE UBE 2 min UBE  2 min UBE 2 min UBE 2 min UBE today 1.5 minutes Nu-step 3 min (did nustep today) 2 min warm up    4 way neck  Seat 1  Seat pad 1 Not today d/t pain SB:  32#   B X 20 Reps    Flexion 26# x 6 reps SB:  30#   B X 20 Reps     flexion 26#    X 11 reps SB:  26#   B X 35 Reps     flexion 26#    X 13 reps Side bending:  X 22#  (bilateral)  And flexion  20 reps each Side bending:  X 22# (bilateral) 20# sidebending each direction    Repeated motions into rotation   Reviewed today and added a towel to assist with HEP x 15 reps    5-10 reps    Repeated motions into sidebending       5-10 reps    Rotary Torso, 90 seconds    Seat pad 2    Limit motion to first notch due to osteoporosis 28# to L      X 8 reps d/t pain 28# to R     28# to L 26# to R 22# to L 22# to R     PNF D2 diagonal      Green tband: X 20     SNAGS   Ed on how to perform at home                                           Current stretches: sidebend, supine cervical extension,   She will lift an area then stretch that area. There are trainers at her gym who walk around and correct technique.     Edu: heat versus ice, stretching at gym and around workouts. Computer ergonomics-monitor height, keyboard height, chair height    Direct supervision provided; direct patient contact provided; therapy services provided with the co-signing licensed therapist guiding and directing the services as well as providing skilled judgement and assessment throughout the session.     Cathleen Real, PT  [facility] Melo, SPT

## 2022-04-26 ENCOUNTER — HOSPITAL ENCOUNTER (OUTPATIENT)
Dept: PHYSICAL THERAPY | Facility: CLINIC | Age: 57
Discharge: HOME | End: 2022-04-26
Payer: COMMERCIAL

## 2022-04-26 DIAGNOSIS — M54.2 CERVICAL PAIN (NECK): Primary | ICD-10-CM

## 2022-04-26 PROCEDURE — 97140 MANUAL THERAPY 1/> REGIONS: CPT | Mod: GP

## 2022-04-26 PROCEDURE — 97110 THERAPEUTIC EXERCISES: CPT | Mod: GP

## 2022-04-26 NOTE — PROGRESS NOTES
Subjective: I am feeling better than last Friday. I feel like I am back at my baseline in terms of pain. My exercises are going well and I only feel this pain when I am active and turning my head.      Assessment: Patient returns for her 5th week of MedX. We held testing on MedX today d/t her having increased soreness. Patient will be tested next week on cervical medX. She experienced slight increase in pain symptoms on 4 way neck and reported that the weight feels so heavy, so hold 4 way neck for now. Initiated mechanical cervical traction using [place] home unit today for pain relief, pt noted muscle relaxation and feeling looser at the end of session.       Cervical MedX Initial testing  3/22/22   AROM (full= 0-120  cervical) 0-114   Max Extension Torque  169   Flex: ext ratio (ideal 1.4:1) 1.97:1       Date 4/26/2022 4/22/2022 4/19/2022 4/15/2022  4/12/22 4/8/2022  4/5/22 3/25/202    Cervical Parameters           Top Dead Center (TDC) 36 36 36 36 36 36 36 36   Counterbalance (CB) 1.3 1.3 1.3 1.3 1.3 1.3 1.3 1.3   Seat Height    Seat pad 2     493, seat pad 2 493, seat pad 2 493, seat pad 2 493, seat pad 2  493, seat pad 2 493 493, seat pad 2 (changed this on 3/25/2022 ) patient felt this was more comfortable   Week/Visit           Enter Week/Visit # Wk 5 Wk 4 V2 Wk 4 V1 Wk 3 V 2 Wk 3 V1 Wk 2 V 2 W2V1 W1V2 (tested only on fist session)   Weight (lbs) 147 141 138 129 126 117 114 108   Reps (#) 15 14 26 24 25 20 20 15   Time 90 70 163  128s 120s 140s    ROM (degrees) 0-114 0-114 0-114 0-114 0-114 0-114 0-114 0-114   Pain fatigue fatigue fatigue  fatigue   sore   Flex:Ext ratio 1.97:1            Date 4/26/2022 4/22/2022 4/19/2022 4/15/2022  4/12/22 4/8/2022  4/5/22 3/25/2022  3/22/22   Exercise            Stretches: UT, LS and scalene         Hold 30 secnds x 1-3 reps   Chin tuck and scapular retraction         10s X 10 reps   Nustep or UBE UBE 2 min UBE 2 min UBE  2 min UBE 2 min UBE 2 min UBE today 1.5 minutes  Nu-step 3 min (did nustep today) 2 min warm up    4 way neck  Seat 1  Seat pad 1 SB: 17 reps on R  15 reps on L  34# B    Flexion: started at 26#, moved to 22# d/t pain, 4 reps Not today d/t pain SB:  32#   B X 20 Reps    Flexion 26# x 6 reps SB:  30#   B X 20 Reps     flexion 26#    X 11 reps SB:  26#   B X 35 Reps     flexion 26#    X 13 reps Side bending:  X 22# (bilateral)  And flexion  20 reps each Side bending:  X 22# (bilateral) 20# sidebending each direction    Repeated motions into rotation    Reviewed today and added a towel to assist with HEP x 15 reps    5-10 reps    Repeated motions into sidebending        5-10 reps    Rotary Torso, 90 seconds    Seat pad 2    Limit motion to first notch due to osteoporosis 28# to L    Maintained weight d/t pain    X 8 reps 28# to L      X 8 reps d/t pain 28# to R     28# to L 26# to R 22# to L 22# to R     PNF D2 diagonal       Green tband: X 20     SNAGS    Ed on how to perform at home                                              Current stretches: sidebend, supine cervical extension,   She will lift an area then stretch that area. There are trainers at her gym who walk around and correct technique.     Edu: heat versus ice, stretching at gym and around workouts. Computer ergonomics-monitor height, keyboard height, chair height    Direct supervision provided; direct patient contact provided; therapy services provided with the co-signing licensed therapist guiding and directing the services as well as providing skilled judgement and assessment throughout the session.     Annie Leger, PT  [facility], SPT

## 2022-05-03 ENCOUNTER — HOSPITAL ENCOUNTER (OUTPATIENT)
Dept: PHYSICAL THERAPY | Facility: CLINIC | Age: 57
Discharge: HOME | End: 2022-05-03
Payer: COMMERCIAL

## 2022-05-03 PROCEDURE — 97110 THERAPEUTIC EXERCISES: CPT | Mod: GP

## 2022-05-03 PROCEDURE — 97140 MANUAL THERAPY 1/> REGIONS: CPT | Mod: GP

## 2022-05-03 NOTE — PROGRESS NOTES
Subjective: I feel about the same. No increase in pain when I am doing my exercises. I feel like I am consistently at my baseline for pain now.      Assessment: Patient returns for her 6th week of MedX. We retested her on the cervical MedX machine and her results for both ROM and strength were decreased from when she originally tested. Pt has been experiencing more neck pain, these results could be due to a decrease in strength, so hold Medx ex a couple visits to see if sx decrease. The cervical MedX machine's calibration was noticeably off today while testing pt. PT noted this and is unsure if the decreased results are due to calibration error or if pt is reaching an ending point in therapy. Will retest in next few visits as calibration was resolved after session. Mechanical traction administered as pt reported relief of symptoms into the next day after using last visit.    Cervical MedX Retest 5/3/2022 Initial testing  3/22/22   AROM (full= 0-120  cervical) 0-108 0-114   Max Extension Torque  132 169   Flex: ext ratio (ideal 1.4:1) 1.67:1 1.97:1       Date 5/3/2022 4/26/2022 4/22/2022 4/19/2022 4/15/2022  4/12/22 4/8/2022  4/5/22 3/25/202    Cervical Parameters            Top Dead Center (TDC)  36 36 36 36 36 36 36 36   Counterbalance (CB)  1.3 1.3 1.3 1.3 1.3 1.3 1.3 1.3   Seat Height    Seat pad 2      493, seat pad 2 493, seat pad 2 493, seat pad 2 493, seat pad 2  493, seat pad 2 493 493, seat pad 2 (changed this on 3/25/2022 ) patient felt this was more comfortable   Week/Visit            Enter Week/Visit # Wk6   RETEST Wk 5 Wk 4 V2 Wk 4 V1 Wk 3 V 2 Wk 3 V1 Wk 2 V 2 W2V1 W1V2 (tested only on fist session)   Weight (lbs)  147 141 138 129 126 117 114 108   Reps (#)  15 14 26 24 25 20 20 15   Time  90 70 163  128s 120s 140s    ROM (degrees)  0-114 0-114 0-114 0-114 0-114 0-114 0-114 0-114   Pain  fatigue fatigue fatigue  fatigue   sore   Flex:Ext ratio  1.97:1            Date 5/3/2022 4/26/2022 4/22/2022  4/19/2022 4/15/2022  4/12/22 4/8/2022  4/5/22 3/25/2022  3/22/22   Exercise             Stretches: UT, LS and scalene          Hold 30 secnds x 1-3 reps   Chin tuck and scapular retraction          10s X 10 reps   Nustep or UBE UBE 2 min UBE 2 min UBE 2 min UBE  2 min UBE 2 min UBE 2 min UBE today 1.5 minutes Nu-step 3 min (did nustep today) 2 min warm up    4 way neck  Seat 1  Seat pad 1  SB: 17 reps on R  15 reps on L  34# B    Flexion: started at 26#, moved to 22# d/t pain, 4 reps Not today d/t pain SB:  32#   B X 20 Reps    Flexion 26# x 6 reps SB:  30#   B X 20 Reps     flexion 26#    X 11 reps SB:  26#   B X 35 Reps     flexion 26#    X 13 reps Side bending:  X 22# (bilateral)  And flexion  20 reps each Side bending:  X 22# (bilateral) 20# sidebending each direction    Repeated motions into rotation     Reviewed today and added a towel to assist with HEP x 15 reps    5-10 reps    Repeated motions into sidebending         5-10 reps    Rotary Torso, 90 seconds    Seat pad 2    Limit motion to first notch due to osteoporosis  28# to L    Maintained weight d/t pain    X 8 reps 28# to L      X 8 reps d/t pain 28# to R     28# to L 26# to R 22# to L 22# to R     PNF D2 diagonal        Green tband: X 20     SNAGS     Ed on how to perform at home                                                 Current stretches: sidebend, supine cervical extension,   She will lift an area then stretch that area. There are trainers at her gym who walk around and correct technique.     Edu: heat versus ice, stretching at gym and around workouts. Computer ergonomics-monitor height, keyboard height, chair height    Direct supervision provided; direct patient contact provided; therapy services provided with the co-signing licensed therapist guiding and directing the services as well as providing skilled judgement and assessment throughout the session.     Annie Leger, PT  [facility] Melo, SPT

## 2022-05-10 ENCOUNTER — HOSPITAL ENCOUNTER (OUTPATIENT)
Dept: PHYSICAL THERAPY | Facility: CLINIC | Age: 57
Discharge: HOME | End: 2022-05-10
Payer: COMMERCIAL

## 2022-05-10 DIAGNOSIS — M47.812 ARTHROPATHY OF CERVICAL FACET JOINT: ICD-10-CM

## 2022-05-10 DIAGNOSIS — M54.2 CERVICAL PAIN (NECK): Primary | ICD-10-CM

## 2022-05-10 PROCEDURE — 97140 MANUAL THERAPY 1/> REGIONS: CPT | Mod: GP

## 2022-05-10 NOTE — PROGRESS NOTES
Subjective: My pain is worse than it has been recently. I feel like my neck catches when I turn certain ways. I feel better than when I started, but my pain has seemed to flare-up a little recently.    Assessment: Patient returns for her 7th week of MedX.  Discussed holding MedX today as she continues to experience an increase in pain symptoms and pt agreed to hold off ex today. Per pts sx, will retest in MedX when pain levels improve slightly. Mechanical traction and MT administered this session for diffusion of pain sx  as pt reported relief of symptoms after treatment. Educated pt on returning to physician for a follow up regarding her increase in symptoms.    Cervical MedX Retest 5/3/2022 Initial testing  3/22/22   AROM (full= 0-120  cervical) 0-108 0-114   Max Extension Torque  132 169   Flex: ext ratio (ideal 1.4:1) 1.67:1 1.97:1       Date 5/10/2022 5/3/2022 4/26/2022 4/22/2022 4/19/2022 4/15/2022  4/12/22 4/8/2022  4/5/22 3/25/202    Cervical Parameters             Top Dead Center (TDC)   36 36 36 36 36 36 36 36   Counterbalance (CB)   1.3 1.3 1.3 1.3 1.3 1.3 1.3 1.3   Seat Height    Seat pad 2     Not done today due to pain  493, seat pad 2 493, seat pad 2 493, seat pad 2 493, seat pad 2  493, seat pad 2 493 493, seat pad 2 (changed this on 3/25/2022 ) patient felt this was more comfortable   Week/Visit             Enter Week/Visit #  Wk6   RETEST Wk 5 Wk 4 V2 Wk 4 V1 Wk 3 V 2 Wk 3 V1 Wk 2 V 2 W2V1 W1V2 (tested only on fist session)   Weight (lbs)   147 141 138 129 126 117 114 108   Reps (#)   15 14 26 24 25 20 20 15   Time   90 70 163  128s 120s 140s    ROM (degrees)   0-114 0-114 0-114 0-114 0-114 0-114 0-114 0-114   Pain   fatigue fatigue fatigue  fatigue   sore   Flex:Ext ratio   1.97:1            Date 5/3/2022 4/26/2022 4/22/2022 4/19/2022 4/15/2022  4/12/22 4/8/2022  4/5/22 3/25/2022  3/22/22   Exercise             Stretches: UT, LS and scalene          Hold 30 secnds x 1-3 reps   Chin tuck and  scapular retraction          10s X 10 reps   Nustep or UBE UBE 2 min UBE 2 min UBE 2 min UBE  2 min UBE 2 min UBE 2 min UBE today 1.5 minutes Nu-step 3 min (did nustep today) 2 min warm up    4 way neck  Seat 1  Seat pad 1  SB: 17 reps on R  15 reps on L  34# B    Flexion: started at 26#, moved to 22# d/t pain, 4 reps Not today d/t pain SB:  32#   B X 20 Reps    Flexion 26# x 6 reps SB:  30#   B X 20 Reps     flexion 26#    X 11 reps SB:  26#   B X 35 Reps     flexion 26#    X 13 reps Side bending:  X 22# (bilateral)  And flexion  20 reps each Side bending:  X 22# (bilateral) 20# sidebending each direction    Repeated motions into rotation     Reviewed today and added a towel to assist with HEP x 15 reps    5-10 reps    Repeated motions into sidebending         5-10 reps    Rotary Torso, 90 seconds    Seat pad 2    Limit motion to first notch due to osteoporosis  28# to L    Maintained weight d/t pain    X 8 reps 28# to L      X 8 reps d/t pain 28# to R     28# to L 26# to R 22# to L 22# to R     PNF D2 diagonal        Green tband: X 20     SNAGS     Ed on how to perform at home                                                 Current stretches: sidebend, supine cervical extension,   She will lift an area then stretch that area. There are trainers at her gym who walk around and correct technique.     Edu: heat versus ice, stretching at gym and around workouts. Computer ergonomics-monitor height, keyboard height, chair height    Direct supervision provided; direct patient contact provided; therapy services provided with the co-signing licensed therapist guiding and directing the services as well as providing skilled judgement and assessment throughout the session.     Annie Leger, PT  [facility] Melo, SPT

## 2022-05-17 ENCOUNTER — HOSPITAL ENCOUNTER (OUTPATIENT)
Dept: PHYSICAL THERAPY | Facility: CLINIC | Age: 57
Discharge: HOME | End: 2022-05-17
Payer: COMMERCIAL

## 2022-05-17 DIAGNOSIS — M47.812 ARTHROPATHY OF CERVICAL FACET JOINT: ICD-10-CM

## 2022-05-17 DIAGNOSIS — M54.2 CERVICAL PAIN (NECK): Primary | ICD-10-CM

## 2022-05-17 PROCEDURE — 97750 PHYSICAL PERFORMANCE TEST: CPT | Mod: GP

## 2022-05-17 PROCEDURE — 97140 MANUAL THERAPY 1/> REGIONS: CPT | Mod: GP

## 2022-05-17 NOTE — PROGRESS NOTES
Subjective: I am feeling similar to last time, feel like I am plateauing at this point. I continue to do my stretches at home, but I plan on talking to the doctor to discuss next steps.    Assessment: Patient returns for her 8th week of MedX.  We decided not to do MedX today as she continues to feel painful symptoms. She plans to talk with Genet Reynolds PA-C about her symptoms and other ways to find relief. Talked about holding her chart for now and wait for a new plan. Mechanical traction administered this session for diffusion of pain sx as pt reported relief after treatment.     Cervical MedX Retest 5/3/2022 Initial testing  3/22/22   AROM (full= 0-120  cervical) 0-108 0-114   Max Extension Torque  132 169   Flex: ext ratio (ideal 1.4:1) 1.67:1 1.97:1       Date 5/10/2022 5/3/2022 4/26/2022 4/22/2022 4/19/2022 4/15/2022  4/12/22 4/8/2022  4/5/22 3/25/202    Cervical Parameters             Top Dead Center (TDC)   36 36 36 36 36 36 36 36   Counterbalance (CB)   1.3 1.3 1.3 1.3 1.3 1.3 1.3 1.3   Seat Height    Seat pad 2     Not done today due to pain  493, seat pad 2 493, seat pad 2 493, seat pad 2 493, seat pad 2  493, seat pad 2 493 493, seat pad 2 (changed this on 3/25/2022 ) patient felt this was more comfortable   Week/Visit             Enter Week/Visit #  Wk6   RETEST Wk 5 Wk 4 V2 Wk 4 V1 Wk 3 V 2 Wk 3 V1 Wk 2 V 2 W2V1 W1V2 (tested only on fist session)   Weight (lbs)   147 141 138 129 126 117 114 108   Reps (#)   15 14 26 24 25 20 20 15   Time   90 70 163  128s 120s 140s    ROM (degrees)   0-114 0-114 0-114 0-114 0-114 0-114 0-114 0-114   Pain   fatigue fatigue fatigue  fatigue   sore   Flex:Ext ratio   1.97:1            Date 5/3/2022 4/26/2022 4/22/2022 4/19/2022 4/15/2022  4/12/22 4/8/2022  4/5/22 3/25/2022  3/22/22   Exercise             Stretches: UT, LS and scalene          Hold 30 secnds x 1-3 reps   Chin tuck and scapular retraction          10s X 10 reps   Nustep or UBE UBE 2 min UBE 2 min UBE 2 min  UBE  2 min UBE 2 min UBE 2 min UBE today 1.5 minutes Nu-step 3 min (did nustep today) 2 min warm up    4 way neck  Seat 1  Seat pad 1  SB: 17 reps on R  15 reps on L  34# B    Flexion: started at 26#, moved to 22# d/t pain, 4 reps Not today d/t pain SB:  32#   B X 20 Reps    Flexion 26# x 6 reps SB:  30#   B X 20 Reps     flexion 26#    X 11 reps SB:  26#   B X 35 Reps     flexion 26#    X 13 reps Side bending:  X 22# (bilateral)  And flexion  20 reps each Side bending:  X 22# (bilateral) 20# sidebending each direction    Repeated motions into rotation     Reviewed today and added a towel to assist with HEP x 15 reps    5-10 reps    Repeated motions into sidebending         5-10 reps    Rotary Torso, 90 seconds    Seat pad 2    Limit motion to first notch due to osteoporosis  28# to L    Maintained weight d/t pain    X 8 reps 28# to L      X 8 reps d/t pain 28# to R     28# to L 26# to R 22# to L 22# to R     PNF D2 diagonal        Green tband: X 20     SNAGS     Ed on how to perform at home                                                 Current stretches: sidebend, supine cervical extension,   She will lift an area then stretch that area. There are trainers at her gym who walk around and correct technique.     Edu: heat versus ice, stretching at gym and around workouts. Computer ergonomics-monitor height, keyboard height, chair height    Direct supervision provided; direct patient contact provided; therapy services provided with the co-signing licensed therapist guiding and directing the services as well as providing skilled judgement and assessment throughout the session.     Annie Leger, PT  [facility] Melo, SPT

## 2022-06-21 NOTE — ADDENDUM NOTE
Encounter addended by: Annie Leger, PT on: 6/21/2022 8:08 AM   Actions taken: Episode resolved, Clinical Note Signed

## 2022-06-21 NOTE — PROGRESS NOTES
[facility]    Outpatient Physical Therapy Discharge Note  Patient: Emilia Mccormick  : 1965    Beginning/End Dates of Reporting Period:  3/22/22 to 22    Referring Provider: Genet Reynolds PA-C    Therapy Diagnosis: Cervical pain     Client Self Report: I am feeling similar to last time, feel like I am plateauing at this point. I continue to do my stretches at home, but I plan on talking to the doctor to discuss next steps.    Objective Measurements:  Objective Measure: NDI: 20  Details: NDI: 10 on 2022  Objective Measure: pain: 7/10 with turning, 0/10 pain when sitting at desk  Details: pain: 1-2/10 on 2022  Objective Measure: Cervical Rotation  Details: Before AAROM: R: 43 dergrees, L: 62 degrees; Adter AAROM: R: 45 degrees, L: 62 degrees        Goals:  Goal Identifier sleep   Goal Description Pt will be able to roll over in bed and not wake due to pain   Target Date 22   Date Met      Progress (detail required for progress note): pt continues to roll over and wake up from her symptoms     Goal Identifier turning head   Goal Description Pt will be able to quickly turn her head while driving with no pt reported pain   Target Date 22   Date Met      Progress (detail required for progress note): pt feels she is experiencing similar symptoms to when she started     Goal Identifier     Goal Description     Target Date     Date Met      Progress (detail required for progress note):       Goal Identifier     Goal Description     Target Date     Date Met      Progress (detail required for progress note):       Goal Identifier     Goal Description     Target Date     Date Met      Progress (detail required for progress note):       Goal Identifier     Goal Description     Target Date     Date Met      Progress (detail required for progress note):       Goal Identifier     Goal  Description     Target Date     Date Met      Progress (detail required for progress note):       Goal Identifier     Goal Description     Target Date     Date Met      Progress (detail required for progress note):             Plan:  Discharge from therapy.    Discharge:    Reason for Discharge: see last note for specifics.  Holding chart for 30 days.     Equipment Issued:     Discharge Plan: Patient to continue home program.

## 2022-06-28 NOTE — ADDENDUM NOTE
Encounter addended by: Sosa Ayoub, OT on: 6/28/2022 4:07 PM   Actions taken: Episode resolved, Clinical Note Signed

## 2022-06-28 NOTE — PROGRESS NOTES
[facility]    Outpatient Occupational Therapy Discharge Note  Patient: Emilia Mccormick  : 1965    Beginning/End Dates of Reporting Period:  2-3-22    Referring Provider: Genet Reynolds PA-C    Therapy Diagnosis: right PC BPPV, unsteadiness, decreased ADL and IADL function    Goals:  Patient did not return for follow up   Goal Identifier     Goal Description Patient will be able to perform bed mobility without vertigo   Target Date 22   Date Met      Progress (detail required for progress note):       Goal Identifier     Goal Description Patient will be able to bend to tie her shoes without vertigo   Target Date 22   Date Met      Progress (detail required for progress note):       Goal Identifier     Goal Description Patient will be able to look up into cupboard without vertigo   Target Date 22   Date Met      Progress (detail required for progress note):       Plan:  Discharge from therapy.

## 2022-07-11 NOTE — PROGRESS NOTES
Assessment:   Emilia Mccormick is a 57 year old y.o. female with past medical history significant for osteoporosis who presents today for follow-up regarding several concerns.  1.  Chronic neck pain without radicular symptoms.  Pain was initially on the left but is now on both sides equally.  Pain got worse after [facility] physical therapy.  My review of an MRI cervical spine shows asymmetric joint space fluid on the left at C2-3 with mild narrowing yoselin ligamentous edema, degenerative in etiology.  There is also severe disc degeneration at C5-6 and C6-7 with mild left foraminal stenosis at C5-6.  Current pain is in the C5-6 region.  Suspect she is symptomatic from the cervical facet arthropathy and disc degeneration.  2.  A 4-week history of right hip and knee pain.  Exam of the right hip and knee was benign.  Suspect she has some mild osteoarthritis.       Plan:     A shared decision making plan was used.  The patient's values and choices were respected.  The following represents what was discussed and decided upon by the physician assistant and the patient.      1.  DIAGNOSTIC TESTS: I reviewed the MRI cervical spine.  - I offered to obtain x-rays of the hip and knee.  She declined for now.    2.  PHYSICAL THERAPY: Patient completed 12 sessions of medics physical therapy May 17, 2022. No further physical therapy was ordered.    3.  MEDICATIONS: No changes are made to the patient's medications.  - Patient uses Advil several times per week which is very helpful.  She does not feel that she needs anything stronger at this point.    4.  INTERVENTIONS: No interventions were ordered.  Patient is not interested in pursuing interventional pain management at this time.  She feels that she can manage her pain with ibuprofen.  - If pain were to worsen, I would likely begin with cervical facet joint injections.  Based on her exam today, I would begin with facet joint injections at C5-6.  -If facet joint injections provided  significant but only short-term relief of her pain, I recommend medial branch blocks as a work-up toward radiofrequency ablation.      5.  PATIENT EDUCATION: Patient is in agreement the above plan.  All questions were answered.  -Patient is going to monitor right hip and knee pain for now.  I told the patient I am happy to evaluate further if this worsens or she can see orthopedic specialist.    6.  FOLLOW-UP: Patient is going to follow-up with me as needed.  If she has questions or concerns, she should not hesitate to call.    Subjective:     Emilia Mccormick is a 57 year old female who presents today for follow-up regarding chronic neck pain.  I saw the patient January 20, 2022.  I refer the patient to physical therapy.  She completed 12 sessions of [facility] physical therapy May 17, 2022.  Patient reports that after her third or fourth session of [facility] physical therapy her neck pain worsened.  She wonders if her head was in the wrong position or if there was too much weight on the machine.  Unfortunate, pain has been persistent ever since then.    Patient complains of bilateral neck pain.  Pain is now equally severe on both sides.  Pain is located in the C5-6 region.  She denies any pain radiating into the shoulders or down the arms.  Denies any numbness, tingling, weakness down the arms.  She does have headaches.  She rates her pain today as a 2 out of 10.  At its worst it is a 7 out of 10.  At its best it is a 1 out of 10.  Pain is aggravated with movement of her head although she states that her range of motion has improved with the stretching exercises.  Applying heat helps alleviate the pain.    Patient also complains of a 4-week history of right hip and knee pain.  Denies trauma.  She feels the pain in the groin.  She states it feels like bone pain.  She then has a intermittent burning pain in the right knee.  She does not feel that these are connected.    As mentioned above, patient completed 12 sessions of  medics physical therapy May 17, 2022.  She still does home exercises on a regular basis.  She uses Advil a couple of times per week which is very helpful for her pain.    Review of Systems:  Positive for headache, pain much worse at night.  Negative for numbness/tingling, weakness, loss of bowel/bladder control, inability to urinate, trip/stumble/falls, difficulty swallowing, difficulty with hand skills, fevers, unintentional weight loss.     Objective:   CONSTITUTIONAL:  Vital signs as above.  No acute distress.  The patient is well nourished and well groomed.    PSYCHIATRIC:  The patient is awake, alert, oriented to person, place and time.  The patient is answering questions appropriately with clear speech.  Normal affect.  HEENT: Normocephalic, atraumatic.  Sclera clear.    LYMPH: No cervical lymphadenopathy  SKIN:  Skin over the face, neck bilateral upper extremities is clean, dry, intact without rashes.  MUSCULOSKELETAL: Tender to palpation bilateral cervical paraspinous muscles at C5-6.  Cervical range of motion is mildly restricted with flexion and extension, mildly restricted with lateral rotation right, minimally restricted with lateral rotation left.  The patient has 5/5 strength for the bilateral shoulder abductors, elbow flexors/extensors, wrist extensors, finger flexors/abductors.  Range of motion of the right hip is within normal limits and range of motion testing did not increase pain.  No tenderness on the groin.  No tenderness about the right knee.  No knee effusion.  No sign of instability in the knee.  NEUROLOGICAL:    Negative Paula's bilaterally.  Sensation to light touch is intact bilateral upper extremities throughout.    RESULTS: I reviewed the MRI cervical spine from [facility] dated February 7, 2022.  At C2-3 there is minimal amount of asymmetric joint space fluid on the left and small adjacent periligamentous and marrow signal edema which appears to be degenerative in etiology.  At C5-6  there is severe disc degeneration with mild left foraminal stenosis.  At C6-7 there is severe disc degeneration with a shallow left paracentral disc protrusion with mild spinal canal stenosis.  Please see report for further details.

## 2022-07-12 ENCOUNTER — OFFICE VISIT (OUTPATIENT)
Dept: PHYSICAL MEDICINE AND REHAB | Facility: CLINIC | Age: 57
End: 2022-07-12
Payer: COMMERCIAL

## 2022-07-12 VITALS
HEIGHT: 60 IN | HEART RATE: 63 BPM | SYSTOLIC BLOOD PRESSURE: 112 MMHG | DIASTOLIC BLOOD PRESSURE: 61 MMHG | WEIGHT: 119 LBS | BODY MASS INDEX: 23.36 KG/M2

## 2022-07-12 DIAGNOSIS — M25.561 ACUTE PAIN OF RIGHT KNEE: ICD-10-CM

## 2022-07-12 DIAGNOSIS — M25.551 HIP PAIN, RIGHT: ICD-10-CM

## 2022-07-12 DIAGNOSIS — M47.812 ARTHROPATHY OF CERVICAL FACET JOINT: Primary | ICD-10-CM

## 2022-07-12 DIAGNOSIS — M50.30 DDD (DEGENERATIVE DISC DISEASE), CERVICAL: ICD-10-CM

## 2022-07-12 PROCEDURE — 99214 OFFICE O/P EST MOD 30 MIN: CPT | Performed by: PHYSICIAN ASSISTANT

## 2022-07-12 RX ORDER — ALENDRONATE SODIUM 70 MG/1
TABLET ORAL
COMMUNITY
Start: 2022-07-05 | End: 2023-11-15

## 2022-07-12 ASSESSMENT — PAIN SCALES - GENERAL: PAINLEVEL: MILD PAIN (2)

## 2022-07-12 NOTE — LETTER
7/12/2022         RE: Emilia Mccormick  [address] [place] [address]        Dear Colleague,    Thank you for referring your patient, Emilia Mccormick, to the [facility]. Please see a copy of my visit note below.    Assessment:   Emilia Mccormick is a 57 year old y.o. female with past medical history significant for osteoporosis who presents today for follow-up regarding several concerns.  1.  Chronic neck pain without radicular symptoms.  Pain was initially on the left but is now on both sides equally.  Pain got worse after [facility] physical therapy.  My review of an MRI cervical spine shows asymmetric joint space fluid on the left at C2-3 with mild narrowing yoselin ligamentous edema, degenerative in etiology.  There is also severe disc degeneration at C5-6 and C6-7 with mild left foraminal stenosis at C5-6.  Current pain is in the C5-6 region.  Suspect she is symptomatic from the cervical facet arthropathy and disc degeneration.  2.  A 4-week history of right hip and knee pain.  Exam of the right hip and knee was benign.  Suspect she has some mild osteoarthritis.       Plan:     A shared decision making plan was used.  The patient's values and choices were respected.  The following represents what was discussed and decided upon by the physician assistant and the patient.      1.  DIAGNOSTIC TESTS: I reviewed the MRI cervical spine.  - I offered to obtain x-rays of the hip and knee.  She declined for now.    2.  PHYSICAL THERAPY: Patient completed 12 sessions of medics physical therapy May 17, 2022. No further physical therapy was ordered.    3.  MEDICATIONS: No changes are made to the patient's medications.  - Patient uses Advil several times per week which is very helpful.  She does not feel that she needs anything stronger at this point.    4.  INTERVENTIONS: No interventions were ordered.  Patient is not interested in pursuing interventional pain management  at this time.  She feels that she can manage her pain with ibuprofen.  - If pain were to worsen, I would likely begin with cervical facet joint injections.  Based on her exam today, I would begin with facet joint injections at C5-6.  -If facet joint injections provided significant but only short-term relief of her pain, I recommend medial branch blocks as a work-up toward radiofrequency ablation.      5.  PATIENT EDUCATION: Patient is in agreement the above plan.  All questions were answered.  -Patient is going to monitor right hip and knee pain for now.  I told the patient I am happy to evaluate further if this worsens or she can see orthopedic specialist.    6.  FOLLOW-UP: Patient is going to follow-up with me as needed.  If she has questions or concerns, she should not hesitate to call.    Subjective:     Emilia Mccormick is a 57 year old female who presents today for follow-up regarding chronic neck pain.  I saw the patient January 20, 2022.  I refer the patient to physical therapy.  She completed 12 sessions of [facility] physical therapy May 17, 2022.  Patient reports that after her third or fourth session of [facility] physical therapy her neck pain worsened.  She wonders if her head was in the wrong position or if there was too much weight on the machine.  Unfortunate, pain has been persistent ever since then.    Patient complains of bilateral neck pain.  Pain is now equally severe on both sides.  Pain is located in the C5-6 region.  She denies any pain radiating into the shoulders or down the arms.  Denies any numbness, tingling, weakness down the arms.  She does have headaches.  She rates her pain today as a 2 out of 10.  At its worst it is a 7 out of 10.  At its best it is a 1 out of 10.  Pain is aggravated with movement of her head although she states that her range of motion has improved with the stretching exercises.  Applying heat helps alleviate the pain.    Patient also complains of a 4-week history of  right hip and knee pain.  Denies trauma.  She feels the pain in the groin.  She states it feels like bone pain.  She then has a intermittent burning pain in the right knee.  She does not feel that these are connected.    As mentioned above, patient completed 12 sessions of medics physical therapy May 17, 2022.  She still does home exercises on a regular basis.  She uses Advil a couple of times per week which is very helpful for her pain.    Review of Systems:  Positive for headache, pain much worse at night.  Negative for numbness/tingling, weakness, loss of bowel/bladder control, inability to urinate, trip/stumble/falls, difficulty swallowing, difficulty with hand skills, fevers, unintentional weight loss.     Objective:   CONSTITUTIONAL:  Vital signs as above.  No acute distress.  The patient is well nourished and well groomed.    PSYCHIATRIC:  The patient is awake, alert, oriented to person, place and time.  The patient is answering questions appropriately with clear speech.  Normal affect.  HEENT: Normocephalic, atraumatic.  Sclera clear.    LYMPH: No cervical lymphadenopathy  SKIN:  Skin over the face, neck bilateral upper extremities is clean, dry, intact without rashes.  MUSCULOSKELETAL: Tender to palpation bilateral cervical paraspinous muscles at C5-6.  Cervical range of motion is mildly restricted with flexion and extension, mildly restricted with lateral rotation right, minimally restricted with lateral rotation left.  The patient has 5/5 strength for the bilateral shoulder abductors, elbow flexors/extensors, wrist extensors, finger flexors/abductors.  Range of motion of the right hip is within normal limits and range of motion testing did not increase pain.  No tenderness on the groin.  No tenderness about the right knee.  No knee effusion.  No sign of instability in the knee.  NEUROLOGICAL:    Negative Paula's bilaterally.  Sensation to light touch is intact bilateral upper extremities  throughout.    RESULTS: I reviewed the MRI cervical spine from [facility] dated February 7, 2022.  At C2-3 there is minimal amount of asymmetric joint space fluid on the left and small adjacent periligamentous and marrow signal edema which appears to be degenerative in etiology.  At C5-6 there is severe disc degeneration with mild left foraminal stenosis.  At C6-7 there is severe disc degeneration with a shallow left paracentral disc protrusion with mild spinal canal stenosis.  Please see report for further details.           Again, thank you for allowing me to participate in the care of your patient.        Sincerely,        Genet Reynolds PA-C

## 2022-07-12 NOTE — PATIENT INSTRUCTIONS
I am sorry that your neck pain did not improve with physical therapy.  That is disappointing.    You can continue using ibuprofen as needed to help manage her pain.    If your neck pain worsens, we do have some options for treatments.  1.  We could try different types of pain relief medications.  2.  We could also try interventional procedures.  For joint pain in the neck we have 2 options: Steroid injections (cortisone shots) into the joints or radiofrequency ablation (burning the nurse that since the pain coming from the joints)    I recommend that you monitor your hip and knee pain for now.  If this worsens I am happy to order x-rays to evaluate and refer to physical therapy.  You could also see an orthopedic specialist if the pain persists.

## 2022-08-17 ENCOUNTER — TRANSFERRED RECORDS (OUTPATIENT)
Dept: MULTI SPECIALTY CLINIC | Facility: CLINIC | Age: 57
End: 2022-08-17

## 2022-08-17 LAB
HPV ABSTRACT: NORMAL
PAP-ABSTRACT: NORMAL

## 2022-08-19 DIAGNOSIS — M81.0 OSTEOPOROSIS: ICD-10-CM

## 2022-08-19 DIAGNOSIS — Z72.0 TOBACCO ABUSE: ICD-10-CM

## 2022-08-19 DIAGNOSIS — M19.90 ARTHRITIS: Primary | ICD-10-CM

## 2022-08-19 DIAGNOSIS — Z13.220 LIPID SCREENING: ICD-10-CM

## 2022-08-19 DIAGNOSIS — I72.9 ANEURYSM (H): ICD-10-CM

## 2022-08-26 ENCOUNTER — LAB (OUTPATIENT)
Dept: LAB | Facility: CLINIC | Age: 57
End: 2022-08-26
Payer: COMMERCIAL

## 2022-08-26 DIAGNOSIS — M81.0 OSTEOPOROSIS: ICD-10-CM

## 2022-08-26 DIAGNOSIS — M19.90 ARTHRITIS: Primary | ICD-10-CM

## 2022-08-26 DIAGNOSIS — Z13.220 LIPID SCREENING: ICD-10-CM

## 2022-08-26 LAB
ALBUMIN SERPL BCG-MCNC: 4.7 G/DL (ref 3.5–5.2)
ALP SERPL-CCNC: 70 U/L (ref 35–104)
ALT SERPL W P-5'-P-CCNC: 21 U/L (ref 10–35)
ANION GAP SERPL CALCULATED.3IONS-SCNC: 10 MMOL/L (ref 7–15)
AST SERPL W P-5'-P-CCNC: 27 U/L (ref 10–35)
BILIRUB SERPL-MCNC: 0.4 MG/DL
BUN SERPL-MCNC: 10.5 MG/DL (ref 8–23)
CALCIUM SERPL-MCNC: 9.6 MG/DL (ref 8.6–10)
CHLORIDE SERPL-SCNC: 105 MMOL/L (ref 98–107)
CHOLEST SERPL-MCNC: 191 MG/DL
CREAT SERPL-MCNC: 0.67 MG/DL (ref 0.51–0.95)
DEPRECATED HCO3 PLAS-SCNC: 26 MMOL/L (ref 22–29)
GFR SERPL CREATININE-BSD FRML MDRD: >90 ML/MIN/1.73M2
GLUCOSE SERPL-MCNC: 82 MG/DL (ref 70–99)
HDLC SERPL-MCNC: 59 MG/DL
LDLC SERPL CALC-MCNC: 121 MG/DL
NONHDLC SERPL-MCNC: 132 MG/DL
POTASSIUM SERPL-SCNC: 4 MMOL/L (ref 3.4–5.3)
PROT SERPL-MCNC: 7.5 G/DL (ref 6.4–8.3)
SODIUM SERPL-SCNC: 141 MMOL/L (ref 136–145)
TRIGL SERPL-MCNC: 55 MG/DL

## 2022-08-26 PROCEDURE — 80061 LIPID PANEL: CPT

## 2022-08-26 PROCEDURE — 81374 HLA I TYPING 1 ANTIGEN LR: CPT

## 2022-08-26 PROCEDURE — 82306 VITAMIN D 25 HYDROXY: CPT

## 2022-08-26 PROCEDURE — 36415 COLL VENOUS BLD VENIPUNCTURE: CPT

## 2022-08-26 PROCEDURE — 86431 RHEUMATOID FACTOR QUANT: CPT

## 2022-08-26 PROCEDURE — 80053 COMPREHEN METABOLIC PANEL: CPT

## 2022-08-29 LAB
DEPRECATED CALCIDIOL+CALCIFEROL SERPL-MC: 50 UG/L (ref 20–75)
ERYTHROCYTE [DISTWIDTH] IN BLOOD BY AUTOMATED COUNT: 14.8 % (ref 10–15)
HCT VFR BLD AUTO: 35 % (ref 35–47)
HGB BLD-MCNC: 11.2 G/DL (ref 11.7–15.7)
MCH RBC QN AUTO: 21 PG (ref 26.5–33)
MCHC RBC AUTO-ENTMCNC: 32 G/DL (ref 31.5–36.5)
MCV RBC AUTO: 66 FL (ref 78–100)
PLATELET # BLD AUTO: 179 10E3/UL (ref 150–450)
RBC # BLD AUTO: 5.33 10E6/UL (ref 3.8–5.2)
RHEUMATOID FACT SER NEPH-ACNC: <6 IU/ML
WBC # BLD AUTO: 6.3 10E3/UL (ref 4–11)

## 2022-08-29 PROCEDURE — 85027 COMPLETE CBC AUTOMATED: CPT

## 2022-08-29 PROCEDURE — 36415 COLL VENOUS BLD VENIPUNCTURE: CPT

## 2022-08-30 LAB
B LOCUS: NORMAL
B27TEST METHOD: NORMAL

## 2022-09-02 ENCOUNTER — ANCILLARY PROCEDURE (OUTPATIENT)
Dept: MAMMOGRAPHY | Facility: CLINIC | Age: 57
End: 2022-09-02
Attending: FAMILY MEDICINE
Payer: COMMERCIAL

## 2022-09-02 DIAGNOSIS — Z12.31 VISIT FOR SCREENING MAMMOGRAM: ICD-10-CM

## 2022-09-02 PROCEDURE — 77067 SCR MAMMO BI INCL CAD: CPT

## 2022-09-23 ENCOUNTER — HOSPITAL ENCOUNTER (OUTPATIENT)
Dept: ULTRASOUND IMAGING | Facility: HOSPITAL | Age: 57
Discharge: HOME | End: 2022-09-23
Attending: PHYSICIAN ASSISTANT
Payer: COMMERCIAL

## 2022-09-23 ENCOUNTER — HOSPITAL ENCOUNTER (OUTPATIENT)
Dept: CT IMAGING | Facility: HOSPITAL | Age: 57
Discharge: HOME | End: 2022-09-23
Attending: FAMILY MEDICINE
Payer: COMMERCIAL

## 2022-09-23 DIAGNOSIS — Z72.0 TOBACCO ABUSE: ICD-10-CM

## 2022-09-23 PROCEDURE — 76775 US EXAM ABDO BACK WALL LIM: CPT

## 2022-09-23 PROCEDURE — 71271 CT THORAX LUNG CANCER SCR C-: CPT

## 2022-11-19 ENCOUNTER — HEALTH MAINTENANCE LETTER (OUTPATIENT)
Age: 57
End: 2022-11-19

## 2022-12-14 DIAGNOSIS — R63.2 HYPERPHAGIA: Primary | ICD-10-CM

## 2022-12-14 RX ORDER — PHENTERMINE HYDROCHLORIDE 37.5 MG/1
18.75-37.5 TABLET ORAL
Qty: 90 TABLET | Refills: 1 | Status: SHIPPED | OUTPATIENT
Start: 2022-12-14 | End: 2023-03-14

## 2023-01-27 ENCOUNTER — OFFICE VISIT (OUTPATIENT)
Dept: PLASTIC SURGERY | Facility: AMBULATORY SURGERY CENTER | Age: 58
End: 2023-01-27

## 2023-01-27 DIAGNOSIS — L98.8 FOREHEAD WRINKLES: Primary | ICD-10-CM

## 2023-01-27 PROCEDURE — 96999 UNLISTED SPEC DERM SVC/PX: CPT | Performed by: PLASTIC SURGERY

## 2023-01-27 NOTE — LETTER
1/27/2023         RE: Emilia Mccormick  [address] [place] [address]        Dear Colleague,    Thank you for referring your patient, Emilia Mccormick, to the [facility] [place]. Please see a copy of my visit note below.    Botulinum Injection Procedure Note:  Cosmetic        ATTENDING STAFF SURGEON: Dr. Whitmore     Assistant: Natalie Milton RN     ANESTHESIA:   None     PREOPERATIVE DIAGNOSIS:   Forehead rhytides and bilateral crow's feet     LOCATION: Forehead and bilateral crow's feet     NDC number :      OPERATION/PROCEDURE:   Botulinum toxin injection in forehead and bilateral crow's feet     Dilution with 4 cc preserved sterile normal saline in a 200 Unit of Botox/ Vial.     Total units of botulinum toxin: 50     POSTOPERATIVE DIAGNOSIS:   SAME      PREPARATION:   Alcohol swab     DESCRIPTION OF OPERATION/PROCEDURE:   The nature and purpose of the procedure, associated risks including but not limited to bruising, headache or discomfort at the site(s), numbness, muscle twitching, brow or eyelid droop, headache, double vision, not enough effect or too much effect, difficulty whistling or drinking from a straw, loss of muscle tone, or infection. Possible consequences and complications, and alternative methods of treatment were explained in detail. The patient declined a personal or family history of neuromuscular disease prior to the procedure.   A signed informed operative consent was obtained.     The patient was taken to the consultation suite and properly positioned. The area to be treated was defined and confirmed by the patient and physician. The area for Botox injection was marked.     Cosmetic procedure: A total of 50 Units were injected into sites at the previously marked target areas: Forehead, bilateral corrugators, procerus and bilateral crow's feet. The patient tolerated the procedure well and there were no complications noted. Patient was given  wound care instructions and will follow-up in approximately 7 days.     Daniel Whitmore MD , FACS   Diplomate American Board of Plastic Surgery  Diplomate American Board of Surgery  Adj. Assistant Professor of Surgery  Division of Plastic & Reconstructive Surgery   [facility]  Office: [phone]   1/27/2023 at 4:48 PM         Again, thank you for allowing me to participate in the care of your patient.        Sincerely,        Daniel Whitmore MD

## 2023-01-27 NOTE — PROGRESS NOTES
Botulinum Injection Procedure Note:  Cosmetic        ATTENDING STAFF SURGEON: Dr. Whitmore     Assistant: Natalie Milton RN     ANESTHESIA:   None     PREOPERATIVE DIAGNOSIS:   Forehead rhytides and bilateral crow's feet     LOCATION: Forehead and bilateral crow's feet     NDC number :      OPERATION/PROCEDURE:   Botulinum toxin injection in forehead and bilateral crow's feet     Dilution with 4 cc preserved sterile normal saline in a 200 Unit of Botox/ Vial.     Total units of botulinum toxin: 50     POSTOPERATIVE DIAGNOSIS:   SAME      PREPARATION:   Alcohol swab     DESCRIPTION OF OPERATION/PROCEDURE:   The nature and purpose of the procedure, associated risks including but not limited to bruising, headache or discomfort at the site(s), numbness, muscle twitching, brow or eyelid droop, headache, double vision, not enough effect or too much effect, difficulty whistling or drinking from a straw, loss of muscle tone, or infection. Possible consequences and complications, and alternative methods of treatment were explained in detail. The patient declined a personal or family history of neuromuscular disease prior to the procedure.   A signed informed operative consent was obtained.     The patient was taken to the consultation suite and properly positioned. The area to be treated was defined and confirmed by the patient and physician. The area for Botox injection was marked.     Cosmetic procedure: A total of 50 Units were injected into sites at the previously marked target areas: Forehead, bilateral corrugators, procerus and bilateral crow's feet. The patient tolerated the procedure well and there were no complications noted. Patient was given wound care instructions and will follow-up in approximately 7 days.     Daniel Whitmore MD , FACS   Diplomate American Board of Plastic Surgery  Diplomate American Board of Surgery  Adj. Assistant Professor of Surgery  Division of Plastic & Reconstructive Surgery   [place]  [facility]  Office: [phone]   1/27/2023 at 4:48 PM

## 2023-01-27 NOTE — NURSING NOTE
Patient here today for cosmetic botox procedure.     No medical changes since last seen.     [facility] Informed Consent for Botox fillers from the clinic was given to the patient, reviewed, signed and sent for scanning.        Natalie Milton RN on 1/27/2023 at 4:28 PM

## 2023-05-03 ENCOUNTER — HOSPITAL ENCOUNTER (OUTPATIENT)
Dept: BONE DENSITY | Facility: HOSPITAL | Age: 58
Discharge: HOME | End: 2023-05-03
Attending: PHYSICIAN ASSISTANT | Admitting: PHYSICIAN ASSISTANT
Payer: COMMERCIAL

## 2023-05-03 DIAGNOSIS — M81.0 OSTEOPOROSIS: ICD-10-CM

## 2023-05-03 PROCEDURE — 77080 DXA BONE DENSITY AXIAL: CPT

## 2023-09-22 NOTE — PATIENT INSTRUCTIONS
"  Dear Emilia Mccormick,    Based on your exposure to COVID-19 (coronavirus), we would like to test you for this virus. I have placed an order for this test. The best time for testing is 5-7 days after the exposure.    How to schedule:  For all employees or close contacts (except [facility] and [employer] - see below), go to your [employer] home page and scroll down to the section that says  You have an appointment that needs to be scheduled  and click the large green button that says  Schedule Now  and follow the steps to find the next available opening.     If you are unable to complete these steps or if you cannot find any available times, please call [phone] to schedule employee testing.     [facility] employees or close contacts, please call [phone].   [facility]) employees or close contacts call [phone].    Return to work/school/ guidance:   For people with high risk exposures outside the home    Please let your workplace manager and staffing office know when your quarantine ends.     We can not give you an exact date as it depends on the information below. You can calculate this on your own or work with your manager/staffing office to calculate this. (For example if you were exposed on 10/4, you would have to quarantine for 14 full days. That would be through 10/18. You could return on 10/19.)    Quarantine Guidelines:  Patients (\"contacts\") who have been in close prolonged contact of an infected person(s) (within six feet for at least 15 minutes within a 24 hour period), and remain asymptomatic should enter quarantine based on the following options:    14-day quarantine period (this remains the CDC recommendation for the greatest protection against spread of COVID-19) OR    Minimum 7-day quarantine with negative RT-PCR test collected on day 5 or later OR    10-day quarantine with no test  Quarantine Guideline exceptions are as follows:    People who have been fully vaccinated do " not need to quarantine if the exposure was at least 2 weeks after the last vaccination. This includes vaccinated health care workers.    Not fully vaccinated and unvaccinated Individuals who work in health care, congregate care, or congregate living should be off work for 14 days from their last date of exposure. Community activities for this group can be resumed based on options above. Fully vaccinated individuals in this group do not need to quarantine from work after exposure.    Not fully vaccinated and unvaccinated people whose high-risk exposure was a household member should always quarantine for 14 days from their last date of exposure. Fully vaccinated people in this category do not need to quarantine.    Not fully vaccinated or unvaccinated residents of congregate care and congregate living settings should always quarantine for 14 days from their last date of exposure. Fully vaccinated residents do not need to quarantine.    Note: If you have ongoing exposure to the covid positive person, this quarantine period may be more than 14 days. (For example, if you are continued to be exposed to your child who tested positive and cannot isolate from them, then the quarantine of 7-14 days can't start until your child is no longer contagious. This is typically 10 days from onset of the child's symptoms. So the total duration may be 17-24 days in this case.)    You should continue symptom monitoring until day 14 post-exposure. If you develop signs or symptoms of COVID-19, isolate and get tested (even if you have been tested already).    How to quarantine:   Stay home and away from others. Don't go to school or anywhere else. Generally quarantine means staying home from work but there are some exceptions to this. Please contact your workplace.  No hugging, kissing or shaking hands.  Don't let anyone visit.  Cover your mouth and nose with a mask, tissue or washcloth to avoid spreading germs.  Wash your hands and face  often. Use soap and water.    What are the symptoms of COVID-19?  The most common symptoms are cough, fever and trouble breathing. Less common symptoms include headache, body aches, fatigue (feeling very tired), chills, sore throat, stuffy or runny nose, diarrhea (loose poop), loss of taste or smell, belly pain, and nausea or vomiting (feeling sick to your stomach or throwing up).  After 14 days, if you have still don't have symptoms, you likely don't have this virus.  If you develop symptoms, follow these guidelines.  If you're normally healthy: Please start another eVisit.  If you have a serious health problem (like cancer, heart failure, an organ transplant or kidney disease): Call your specialty clinic. Let them know that you might have COVID-19.    Where can I get more information?   [facility] - About COVID-19: [email or web address]  CDC - What to Do If You're Sick: www.cdc.gov/coronavirus/2019-ncov/about/steps-when-sick.html  CDC - Ending Home Isolation: www.cdc.gov/coronavirus/2019-ncov/hcp/disposition-in-home-patients.html  CDC - Caring for Someone: www.cdc.gov/coronavirus/2019-ncov/if-you-are-sick/care-for-someone.html  [facility] clinical trials (COVID-19 research studies): clinicalaffairs.[facility]-clinical-trials  Below are the COVID-19 hotlines at the [facility] ([facility]). Interpreters are available.  For health questions: Call 734-039-8466 or 1-606.264.9135 (7 a.m. to 7 p.m.)  For questions about schools and childcare: Call 023-883-9770 or 1-241.253.4092 (7 a.m. to 7 p.m.)      December 26, 2021  RE:  Emilia Mccormick                                                                                                                   [address] [place] [address]      To whom it may concern:    I evaluated Emilia Mccormick on December 26, 2021. Emilia Mccormick should be excused from work/school.    They should let their workplace  "manager and staffing office know when their quarantine ends.    We can not give an exact date as it depends on the information below. They can calculate this on their own or work with their manager/staffing office to calculate this. (For example if they were exposed on 10/04, they would have to quarantine for 14 full days. That would be through 10/18. They could return on 10/19.)    Quarantine Guidelines:    Patients (\"contacts\") who have been in close prolonged contact of an infected person(s) (within six feet for at least 15 minutes within a 24 hour period) and remain asymptomatic should enter quarantine based on the following options:      14-day quarantine period (this remains the CDC recommendation for the greatest protection against spread of COVID-19) OR    Minimum 7-day quarantine with negative RT-PCR test collected on day 5 or later OR    10-day quarantine with no test   Quarantine Guideline exceptions are as follows:    People who have been fully vaccinated do not need to quarantine if the exposure was at least 2 weeks after the last vaccination. This includes vaccinated health care workers.    Not fully vaccinated and unvaccinated Individuals who work in health care, congregate care, or congregate living should be off work for 14 days from their last date of exposure. Community activities for this group can be resumed based on options above. Fully vaccinated individuals in this group do not need to quarantine from work after exposure.    Not fully vaccinated and unvaccinated people whose high-risk exposure was a household member should always quarantine for 14 days from their last date of exposure. Fully vaccinated people in this category do not need to quarantine.    Not fully vaccinated or unvaccinated residents of congregate care and congregate living settings should always quarantine for 14 days from their last date of exposure. Fully vaccinated residents do not need to quarantine.    Note: If there " is ongoing exposure to the covid positive person, this quarantine period may be longer than 14 days. (For example, if they are continually exposed to their child, who tested positive and cannot isolate from them, then the quarantine of 7-14 days can't start until their child is no longer contagious. This is typically 10 days from onset to the child's symptoms. So the total duration may be 17-24 days in this case.)    Emilia Mccormick should continue symptom monitoring until day 14 post-exposure. If they develop signs or symptoms of COVID-19, they should isolate and get tested (even if they have been tested already).    Sincerely,  Kylee Carson PA-C   [facility] Pregnancy And Lactation Text: This medication is Pregnancy Category C and it isn't known if it is safe during pregnancy. It is unknown if it is excreted in breast milk.

## 2023-10-01 ASSESSMENT — ENCOUNTER SYMPTOMS
BREAST MASS: 0
CONSTIPATION: 0
FREQUENCY: 0
HEADACHES: 0
SHORTNESS OF BREATH: 0
COUGH: 0
HEARTBURN: 0
ABDOMINAL PAIN: 0
MYALGIAS: 0
PALPITATIONS: 0
DYSURIA: 0
PARESTHESIAS: 0
SORE THROAT: 0
ARTHRALGIAS: 0
EYE PAIN: 0
NAUSEA: 0
HEMATOCHEZIA: 0
NERVOUS/ANXIOUS: 0
CHILLS: 0
DIZZINESS: 0
WEAKNESS: 0
DIARRHEA: 0
JOINT SWELLING: 0
HEMATURIA: 0
FEVER: 0

## 2023-10-04 ENCOUNTER — OFFICE VISIT (OUTPATIENT)
Dept: FAMILY MEDICINE | Facility: CLINIC | Age: 58
End: 2023-10-04
Payer: COMMERCIAL

## 2023-10-04 VITALS
HEIGHT: 60 IN | OXYGEN SATURATION: 98 % | BODY MASS INDEX: 23.66 KG/M2 | DIASTOLIC BLOOD PRESSURE: 80 MMHG | HEART RATE: 54 BPM | TEMPERATURE: 97.6 F | RESPIRATION RATE: 20 BRPM | WEIGHT: 120.5 LBS | SYSTOLIC BLOOD PRESSURE: 108 MMHG

## 2023-10-04 DIAGNOSIS — Z00.00 ROUTINE GENERAL MEDICAL EXAMINATION AT A HEALTH CARE FACILITY: Primary | ICD-10-CM

## 2023-10-04 DIAGNOSIS — Z12.11 SCREEN FOR COLON CANCER: ICD-10-CM

## 2023-10-04 DIAGNOSIS — D56.3 THALASSEMIA MINOR: ICD-10-CM

## 2023-10-04 DIAGNOSIS — M81.8 IDIOPATHIC OSTEOPOROSIS: ICD-10-CM

## 2023-10-04 PROCEDURE — 90471 IMMUNIZATION ADMIN: CPT | Performed by: NURSE PRACTITIONER

## 2023-10-04 PROCEDURE — 91320 SARSCV2 VAC 30MCG TRS-SUC IM: CPT | Performed by: NURSE PRACTITIONER

## 2023-10-04 PROCEDURE — 99386 PREV VISIT NEW AGE 40-64: CPT | Mod: 25 | Performed by: NURSE PRACTITIONER

## 2023-10-04 PROCEDURE — 90480 ADMN SARSCOV2 VAC 1/ONLY CMP: CPT | Performed by: NURSE PRACTITIONER

## 2023-10-04 PROCEDURE — 90715 TDAP VACCINE 7 YRS/> IM: CPT | Performed by: NURSE PRACTITIONER

## 2023-10-04 RX ORDER — PHENTERMINE HYDROCHLORIDE 37.5 MG/1
TABLET ORAL
COMMUNITY
Start: 2023-03-17 | End: 2023-10-04

## 2023-10-04 ASSESSMENT — ENCOUNTER SYMPTOMS
CHILLS: 0
JOINT SWELLING: 0
EYE PAIN: 0
NAUSEA: 0
DYSURIA: 0
CONSTIPATION: 0
PARESTHESIAS: 0
FREQUENCY: 0
DIZZINESS: 0
DIARRHEA: 0
SHORTNESS OF BREATH: 0
ABDOMINAL PAIN: 0
HEMATURIA: 0
ARTHRALGIAS: 0
HEADACHES: 0
FEVER: 0
SORE THROAT: 0
PALPITATIONS: 0
MYALGIAS: 0
HEARTBURN: 0
HEMATOCHEZIA: 0
WEAKNESS: 0
BREAST MASS: 0
NERVOUS/ANXIOUS: 0
COUGH: 0

## 2023-10-04 NOTE — PROGRESS NOTES
SUBJECTIVE:   CC: Gil is an 58 year old who presents for preventive health visit.       10/4/2023     9:14 AM   Additional Questions   Roomed by lc-lpn   Accompanied by annabel         10/4/2023     9:14 AM   Patient Reported Additional Medications   Patient reports taking the following new medications na     Patient here for establish care - previous PCP in [place] - pulled info from care everywhere     9/20/22 - Mammo BIRADS 1  9/23/22 - low dose CT normal   9/23/22 - AAA screening negative  5/3/23 - DEXA scan osteoperosis  PAP done 8/17/22 - NILM and HPV hHR negative - See [facility] documentation only note from 8/17/22 - results from that days' pap in the content of the note.   Colonosocpy ordered but not completed.     Hx of ablation - not sure when menopause happened - bone density low at a young age - takes calcium and vitamin D - does weight bearing exercise 3-4 times/week. Fosamax weekly.  No GERD or abd pain.     Post menopausal: atrophy     Healthy Habits:     Getting at least 3 servings of Calcium per day:  Yes    Bi-annual eye exam:  Yes    Dental care twice a year:  Yes    Sleep apnea or symptoms of sleep apnea:  None    Diet:  Regular (no restrictions), Low fat/cholesterol and Other    Frequency of exercise:  6-7 days/week    Duration of exercise:  Greater than 60 minutes    Taking medications regularly:  Yes    Medication side effects:  Not applicable    Additional concerns today:  No      Today's PHQ-2 Score:       10/4/2023     9:09 AM   PHQ-2 ( 1999 [employer])   Q1: Little interest or pleasure in doing things 0   Q2: Feeling down, depressed or hopeless 0   PHQ-2 Score 0   Q1: Little interest or pleasure in doing things Not at all   Q2: Feeling down, depressed or hopeless Not at all   PHQ-2 Score 0             Have you ever done Advance Care Planning? (For example, a Health Directive, POLST, or a discussion with a medical provider or your loved ones about your wishes): No, advance  care planning information given to patient to review.  Patient plans to discuss their wishes with loved ones or provider.      Social History     Tobacco Use    Smoking status: Former     Types: Cigarettes    Smokeless tobacco: Never    Tobacco comments:     Started smoking at age 10 - quit smoking 30 years ago. quit NRT - in Jan.    Substance Use Topics    Alcohol use: Yes     Comment: Occasional             10/1/2023     3:15 PM   Alcohol Use   Prescreen: >3 drinks/day or >7 drinks/week? No     Reviewed orders with patient.  Reviewed health maintenance and updated orders accordingly - Yes      Breast Cancer Screening:        10/1/2023     3:17 PM   Breast CA Risk Assessment (FHS-7)   Do you have a family history of breast, colon, or ovarian cancer? No / Unknown           Pertinent mammograms are reviewed under the imaging tab.    History of abnormal Pap smear: NO - age 30-65 PAP every 5 years with negative HPV co-testing recommended     Reviewed and updated as needed this visit by clinical staff   Tobacco  Allergies  Meds  Problems  Med Hx  Surg Hx  Fam Hx          Reviewed and updated as needed this visit by Provider   Tobacco  Allergies  Meds  Problems  Med Hx  Surg Hx  Fam Hx             Review of Systems   Constitutional:  Negative for chills and fever.   HENT:  Negative for congestion, ear pain, hearing loss and sore throat.    Eyes:  Negative for pain and visual disturbance.   Respiratory:  Negative for cough and shortness of breath.    Cardiovascular:  Negative for chest pain, palpitations and peripheral edema.   Gastrointestinal:  Negative for abdominal pain, constipation, diarrhea, heartburn, hematochezia and nausea.   Breasts:  Negative for tenderness, breast mass and discharge.   Genitourinary:  Negative for dysuria, frequency, genital sores, hematuria, pelvic pain, urgency, vaginal bleeding and vaginal discharge.   Musculoskeletal:  Negative for arthralgias, joint swelling and myalgias.    Skin:  Negative for rash.   Neurological:  Negative for dizziness, weakness, headaches and paresthesias.   Psychiatric/Behavioral:  Negative for mood changes. The patient is not nervous/anxious.           OBJECTIVE:   /80 (BP Location: Right arm, Patient Position: Sitting, Cuff Size: Adult Regular)   Pulse 54   Temp 97.6  F (36.4  C) (Oral)   Resp 20   Ht 1.524 m (5')   Wt 54.7 kg (120 lb 8 oz)   SpO2 98%   BMI 23.53 kg/m    Physical Exam  GENERAL: healthy, alert and no distress  EYES: Eyes grossly normal to inspection, PERRL and conjunctivae and sclerae normal  HENT: ear canals and TM's normal, nose and mouth without ulcers or lesions  NECK: no adenopathy, no asymmetry, masses, or scars and thyroid normal to palpation  RESP: lungs clear to auscultation - no rales, rhonchi or wheezes  CV: regular rate and rhythm, normal S1 S2, no S3 or S4, no murmur, click or rub, no peripheral edema and peripheral pulses strong  ABDOMEN: soft, nontender, no hepatosplenomegaly, no masses and bowel sounds normal  MS: no gross musculoskeletal defects noted, no edema  SKIN: no suspicious lesions or rashes  NEURO: Normal strength and tone, mentation intact and speech normal  PSYCH: mentation appears normal, affect normal/bright    The 10-year ASCVD risk score (Mckayla DK, et al., 2019) is: 2.2%    Values used to calculate the score:      Age: 58 years      Sex: Female      Is Non- : No      Diabetic: No      Tobacco smoker: No      Systolic Blood Pressure: 108 mmHg      Is BP treated: No      HDL Cholesterol: 59 mg/dL      Total Cholesterol: 191 mg/dL          ASSESSMENT/PLAN:   Emilia was seen today for physical.    Diagnoses and all orders for this visit:    Routine general medical examination at a health care facility  We discussed healthy lifestyle, nutrition, cardiovascular risk reduction, self care, safety, sunscreen, and timing of cancer screening.  Health maintenance screening and  immunizations reviewed with the patient.  Follow up yearly for the annual physical.    She would like [place] every other year    Idiopathic osteoporosis  On fosamax weekly  Last DEXA scan 5/2023  Continue weight bearing exercise and vitamin D and Ca    Screen for colon cancer  -     Colonoscopy Screening  Referral; Future  Ordered per pt request    Thalassemia minor  Stable per lab review    Discussed lab options - pt would like to wait.     Other orders  -     REVIEW OF HEALTH MAINTENANCE PROTOCOL ORDERS  -     TDAP 10-64Y (ADACEL,BOOSTRIX)  -     COVID-19 12+ (2023-24) (PFIZER)    Pt to follow up with me to discussed vaginal atrophy and osteoporosis plan of care          COUNSELING:  Reviewed preventive health counseling, as reflected in patient instructions       Regular exercise       Healthy diet/nutrition       Osteoporosis prevention/bone health        She reports that she has quit smoking. Her smoking use included cigarettes. She has never used smokeless tobacco.          JOSE Renteria CNP  [facility]

## 2023-10-04 NOTE — Clinical Note
Care everywhere: 9/20/22 - Mammo BIRADS 1 9/23/22 - low dose CT normal  9/23/22 - AAA screening negative 5/3/23 - DEXA scan osteoperosis PAP done 8/17/22 - NILM and HPV hHR negative - See [facility] documentation only note from 8/17/22 - results from that days' pap in the content of the note.

## 2023-10-17 ENCOUNTER — ANCILLARY PROCEDURE (OUTPATIENT)
Dept: MAMMOGRAPHY | Facility: CLINIC | Age: 58
End: 2023-10-17
Attending: FAMILY MEDICINE
Payer: COMMERCIAL

## 2023-10-17 DIAGNOSIS — Z12.31 VISIT FOR SCREENING MAMMOGRAM: ICD-10-CM

## 2023-10-17 PROCEDURE — 77067 SCR MAMMO BI INCL CAD: CPT

## 2023-11-15 ENCOUNTER — OFFICE VISIT (OUTPATIENT)
Dept: FAMILY MEDICINE | Facility: CLINIC | Age: 58
End: 2023-11-15
Payer: COMMERCIAL

## 2023-11-15 VITALS
BODY MASS INDEX: 23.91 KG/M2 | DIASTOLIC BLOOD PRESSURE: 72 MMHG | HEIGHT: 60 IN | WEIGHT: 121.8 LBS | HEART RATE: 73 BPM | RESPIRATION RATE: 18 BRPM | TEMPERATURE: 97.6 F | SYSTOLIC BLOOD PRESSURE: 102 MMHG | OXYGEN SATURATION: 98 %

## 2023-11-15 DIAGNOSIS — M81.8 IDIOPATHIC OSTEOPOROSIS: ICD-10-CM

## 2023-11-15 DIAGNOSIS — N95.2 VAGINAL ATROPHY: Primary | ICD-10-CM

## 2023-11-15 PROCEDURE — 99213 OFFICE O/P EST LOW 20 MIN: CPT | Performed by: NURSE PRACTITIONER

## 2023-11-15 RX ORDER — ESTRADIOL 0.1 MG/G
1 CREAM VAGINAL DAILY
Qty: 42.5 G | Refills: 1 | Status: SHIPPED | OUTPATIENT
Start: 2023-11-15 | End: 2024-03-29

## 2023-11-15 NOTE — PROGRESS NOTES
Assessment & Plan     Vaginal atrophy  Patient is low risk for breast and ovarian cancer based on personal and family history  -Discussed estrogen replacement options including the ring, tablet, cream.  Cream is covered by her insurance Per review in the chart  -1 g of cream intravaginally administered daily for 2 weeks, then reduce to twice weekly - after 3 moths will consider reducing to 1 time weekly, then 2 times monthly   -Avoid intercourse during time when cream is indwelling  -May use over-the-counter vaginal moisturizers as well for intercourse -and lubricant.  Handout given to patient today    Patient to follow-up in 3 months    Idiopathic osteoporosis  Discussed with pt she has been on for at least 5 years  Will hold for 1 year and revisit with DEXA scan - if significant density loss - resume otherwise will do 2 year holiday - consider  -discussed results Vitamin D and Calcium and weight bearing exercise                     JOSE Rentreia CNP  [facility]        Refugio Cordero is a 58 year old, presenting for the following health issues:  Follow Up (Follow up from last visit.)      11/15/2023     3:11 PM   Additional Questions   Roomed by lc-lpn   Accompanied by n/a       History of Present Illness       Reason for visit:  Vaginal atrophy  Symptom onset:  More than a month  Symptoms include:  Dryness  Symptom intensity:  Moderate  Symptom progression:  Staying the same  Had these symptoms before:  Yes  Has tried/received treatment for these symptoms:  No  What makes it worse:  No  What makes it better:  No    She eats 2-3 servings of fruits and vegetables daily.She consumes 0 sweetened beverage(s) daily.She exercises with enough effort to increase her heart rate 10 to 19 minutes per day.  She exercises with enough effort to increase her heart rate 5 days per week.   She is taking medications regularly.     Post menopausal: atrophy - feels dryness and feels self  awareness down there e- chafing - would like to have sex again -  ()    Hx of ablation - not sure when menopause happened - bone density low at a young age - takes calcium and vitamin D - does weight bearing exercise 3-4 times/week. Fosamax weekly.  No GERD or abd pain.           Objective    /72 (BP Location: Right arm, Patient Position: Sitting, Cuff Size: Adult Regular)   Pulse 73   Temp 97.6  F (36.4  C) (Oral)   Resp 18   Ht 1.524 m (5')   Wt 55.2 kg (121 lb 12.8 oz)   LMP  (LMP Unknown)   SpO2 98%   BMI 23.79 kg/m    Body mass index is 23.79 kg/m .  Physical Exam   Psych: well groomed, good eye contact, appropriate questions and thought content  General: aaoX3, non anxious appearing  : declines pelvic discomfort -Pelvic exam not performed today - discussed ruggea reduction and s/sx pf atrophy in place

## 2023-11-15 NOTE — PATIENT INSTRUCTIONS
Genitourinary syndrome of menopause (vulvovaginal atrophy): Treatment - UpToDate     Low-dose vaginal estrogen is the most effective treatment for moderate to severe symptoms of vaginal atrophy not responsive to nonhormonal intervention. Use of estrogen therapy is appropriate for patients with symptoms of vaginal atrophy in the setting of low estrogen levels, provided that there are no contraindications to this therapy (eg, some patients with estrogen-dependent tumors, particularly those on antiestrogen therapy).  Adequate estrogen therapy leads to restoration of the normal vaginal acidic pH and microflora, thickening of the epithelium, increased vaginal secretions, and decreased vaginal dryness and resultant dyspareunia. In addition, estrogen therapy is associated with urinary tract benefits. These include a reduction in the incidence of urinary tract infections and overactive bladder symptoms. Urgency and stress urinary incontinence, however, do not improve with estrogen therapy alone    Preparations: Cream, tablet, capsule, ring -- The preparations of vaginal estrogen that are commercially available in the [place] are conjugated estrogens (cream) and estradiol (cream, tablet, capsule, and ring)     A systematic review of 19 randomized trials including over 4000 patients investigated local estrogen treatment and found that creams, inserts, and rings were all similarly effective in relieving symptoms of vaginal atrophy     Response and duration of therapy -- In our experience, patients usually have improvement in symptoms after two to four weeks of vaginal estrogen therapy. In a trial comparing the 4 and 10 mcg dose of estradiol vaginal tablets, a positive response by two weeks of treatment was indicative of continued response at 12 weeks . The duration of treatment needed to improve symptoms and maintain this improvement may vary across patients and should be individualized according to each patient's degree  of vaginal atrophy symptoms. Low-dose vaginal estrogen therapy may be used indefinitely, based upon the low risk of adverse effects, although clinical trials to date have not followed patients beyond one year. The duration of higher dose vaginal therapy or systemic estrogen therapy should be guided by the risks and benefits of therapy.

## 2024-01-23 RX ORDER — CALCIUM POLYCARBOPHIL 625 MG
TABLET ORAL
COMMUNITY

## 2024-01-26 ENCOUNTER — ANESTHESIA EVENT (OUTPATIENT)
Dept: GASTROENTEROLOGY | Facility: CLINIC | Age: 59
End: 2024-01-26
Payer: COMMERCIAL

## 2024-01-26 RX ORDER — ONDANSETRON 4 MG/1
4 TABLET, ORALLY DISINTEGRATING ORAL EVERY 30 MIN PRN
Status: CANCELLED | OUTPATIENT
Start: 2024-01-26

## 2024-01-26 RX ORDER — ONDANSETRON 2 MG/ML
4 INJECTION INTRAMUSCULAR; INTRAVENOUS EVERY 30 MIN PRN
Status: CANCELLED | OUTPATIENT
Start: 2024-01-26

## 2024-01-26 ASSESSMENT — LIFESTYLE VARIABLES: TOBACCO_USE: 1

## 2024-01-26 NOTE — ANESTHESIA PREPROCEDURE EVALUATION
Anesthesia Pre-Procedure Evaluation    Patient: Emilia Mccormick   MRN: [id number] : 1965        Procedure : Procedure(s):  Colonoscopy          Past Medical History:   Diagnosis Date     Arthritis [id number]     Kidney stone      Osteoporosis      Thalassemia      Thyroid disease     Reslolved, thyroid lobectomy in       Past Surgical History:   Procedure Laterality Date     COLONOSCOPY  2013     ENDOMETRIAL ABLATION       HEAD & NECK SURGERY      Thyroid Lobectomy (Huerthle cell adenoma)     THYROID SURGERY  2000    lobectomy     TUBAL LIGATION       URETEROSCOPY        Allergies   Allergen Reactions     Lexapro [Escitalopram] Unknown     Penicillins Unknown      Social History     Tobacco Use     Smoking status: Former     Types: Cigarettes     Smokeless tobacco: Never     Tobacco comments:     Started smoking at age 10 - quit smoking 30 years ago. quit NRT - in .    Substance Use Topics     Alcohol use: Yes     Comment: Occasional      Wt Readings from Last 1 Encounters:   11/15/23 55.2 kg (121 lb 12.8 oz)        Anesthesia Evaluation   Pt has had prior anesthetic. Type: MAC and General.        ROS/MED HX  ENT/Pulmonary:     (+)                tobacco use, Past use,                       Neurologic:  - neg neurologic ROS     Cardiovascular:  - neg cardiovascular ROS     METS/Exercise Tolerance: >4 METS    Hematologic:     (+)      anemia (thalassemia),          Musculoskeletal: Comment: osteoporosis - neg musculoskeletal ROS     GI/Hepatic:  - neg GI/hepatic ROS     Renal/Genitourinary:     (+)       Nephrolithiasis ,       Endo:     (+)          thyroid problem, hypothyroidism,           Psychiatric/Substance Use:  - neg psychiatric ROS     Infectious Disease:  - neg infectious disease ROS     Malignancy:  - neg malignancy ROS     Other:  - neg other ROS          Physical Exam    Airway  airway exam normal      Mallampati: II   TM distance: > 3 FB   Neck ROM: full   Mouth  "opening: > 3 cm    Respiratory Devices and Support         Dental       (+) Minor Abnormalities - some fillings, tiny chips      Cardiovascular   cardiovascular exam normal          Pulmonary   pulmonary exam normal        breath sounds clear to auscultation       OUTSIDE LABS:  CBC:   Lab Results   Component Value Date    WBC 6.3 08/29/2022    WBC 4.1 07/29/2020    HGB 11.2 (L) 08/29/2022    HGB 12.0 12/22/2020    HCT 35.0 08/29/2022    HCT 36.8 07/29/2020     08/29/2022     07/29/2020     BMP:   Lab Results   Component Value Date     08/26/2022    POTASSIUM 4.0 08/26/2022    CHLORIDE 105 08/26/2022    CO2 26 08/26/2022    BUN 10.5 08/26/2022    CR 0.67 08/26/2022    GLC 82 08/26/2022    GLC 81 07/29/2020     COAGS: No results found for: \"PTT\", \"INR\", \"FIBR\"  POC: No results found for: \"BGM\", \"HCG\", \"HCGS\"  HEPATIC:   Lab Results   Component Value Date    ALBUMIN 4.7 08/26/2022    PROTTOTAL 7.5 08/26/2022    ALT 21 08/26/2022    AST 27 08/26/2022    ALKPHOS 70 08/26/2022    BILITOTAL 0.4 08/26/2022     OTHER:   Lab Results   Component Value Date    RANJITH 9.6 08/26/2022       Anesthesia Plan    ASA Status:  2    NPO Status:  NPO Appropriate    Anesthesia Type: General.     - Airway: Native airway   Induction: Intravenous.   Maintenance: TIVA.        Consents    Anesthesia Plan(s) and associated risks, benefits, and realistic alternatives discussed. Questions answered and patient/representative(s) expressed understanding.     - Discussed: Risks, Benefits and Alternatives for BOTH SEDATION and the PROCEDURE were discussed     - Discussed with:  Patient            Postoperative Care            Comments:               Sulaiman Light, JOSE CRNA    I have reviewed the pertinent notes and labs in the chart from the past 30 days and (re)examined the patient.  Any updates or changes from those notes are reflected in this note.                  "

## 2024-01-31 ENCOUNTER — ANESTHESIA (OUTPATIENT)
Dept: GASTROENTEROLOGY | Facility: CLINIC | Age: 59
End: 2024-01-31
Payer: COMMERCIAL

## 2024-01-31 ENCOUNTER — HOSPITAL ENCOUNTER (OUTPATIENT)
Facility: CLINIC | Age: 59
Discharge: HOME | End: 2024-01-31
Attending: SURGERY | Admitting: SURGERY
Payer: COMMERCIAL

## 2024-01-31 VITALS
HEIGHT: 60 IN | DIASTOLIC BLOOD PRESSURE: 70 MMHG | WEIGHT: 121 LBS | SYSTOLIC BLOOD PRESSURE: 101 MMHG | TEMPERATURE: 97.9 F | BODY MASS INDEX: 23.75 KG/M2 | HEART RATE: 67 BPM | RESPIRATION RATE: 15 BRPM | OXYGEN SATURATION: 99 %

## 2024-01-31 LAB — COLONOSCOPY: NORMAL

## 2024-01-31 PROCEDURE — 88305 TISSUE EXAM BY PATHOLOGIST: CPT | Mod: TC | Performed by: SURGERY

## 2024-01-31 PROCEDURE — 258N000003 HC RX IP 258 OP 636: Performed by: SURGERY

## 2024-01-31 PROCEDURE — 250N000011 HC RX IP 250 OP 636: Performed by: NURSE ANESTHETIST, CERTIFIED REGISTERED

## 2024-01-31 PROCEDURE — 45380 COLONOSCOPY AND BIOPSY: CPT | Mod: PT | Performed by: SURGERY

## 2024-01-31 PROCEDURE — 250N000009 HC RX 250: Performed by: NURSE ANESTHETIST, CERTIFIED REGISTERED

## 2024-01-31 PROCEDURE — 370N000017 HC ANESTHESIA TECHNICAL FEE, PER MIN: Performed by: SURGERY

## 2024-01-31 RX ORDER — NALOXONE HYDROCHLORIDE 0.4 MG/ML
0.2 INJECTION, SOLUTION INTRAMUSCULAR; INTRAVENOUS; SUBCUTANEOUS
Status: CANCELLED | OUTPATIENT
Start: 2024-01-31

## 2024-01-31 RX ORDER — LIDOCAINE HYDROCHLORIDE 20 MG/ML
INJECTION, SOLUTION INFILTRATION; PERINEURAL PRN
Status: DISCONTINUED | OUTPATIENT
Start: 2024-01-31 | End: 2024-01-31

## 2024-01-31 RX ORDER — PROPOFOL 10 MG/ML
INJECTION, EMULSION INTRAVENOUS CONTINUOUS PRN
Status: DISCONTINUED | OUTPATIENT
Start: 2024-01-31 | End: 2024-01-31

## 2024-01-31 RX ORDER — NALOXONE HYDROCHLORIDE 0.4 MG/ML
0.4 INJECTION, SOLUTION INTRAMUSCULAR; INTRAVENOUS; SUBCUTANEOUS
Status: CANCELLED | OUTPATIENT
Start: 2024-01-31

## 2024-01-31 RX ORDER — SODIUM CHLORIDE, SODIUM LACTATE, POTASSIUM CHLORIDE, CALCIUM CHLORIDE 600; 310; 30; 20 MG/100ML; MG/100ML; MG/100ML; MG/100ML
INJECTION, SOLUTION INTRAVENOUS CONTINUOUS
Status: DISCONTINUED | OUTPATIENT
Start: 2024-01-31 | End: 2024-01-31 | Stop reason: HOSPADM

## 2024-01-31 RX ORDER — LIDOCAINE 40 MG/G
CREAM TOPICAL
Status: DISCONTINUED | OUTPATIENT
Start: 2024-01-31 | End: 2024-01-31 | Stop reason: HOSPADM

## 2024-01-31 RX ORDER — FLUMAZENIL 0.1 MG/ML
0.2 INJECTION, SOLUTION INTRAVENOUS
Status: CANCELLED | OUTPATIENT
Start: 2024-01-31 | End: 2024-01-31

## 2024-01-31 RX ORDER — ONDANSETRON 2 MG/ML
4 INJECTION INTRAMUSCULAR; INTRAVENOUS
Status: DISCONTINUED | OUTPATIENT
Start: 2024-01-31 | End: 2024-01-31 | Stop reason: HOSPADM

## 2024-01-31 RX ADMIN — SODIUM CHLORIDE, POTASSIUM CHLORIDE, SODIUM LACTATE AND CALCIUM CHLORIDE: 600; 310; 30; 20 INJECTION, SOLUTION INTRAVENOUS at 11:09

## 2024-01-31 RX ADMIN — PROPOFOL 175 MCG/KG/MIN: 10 INJECTION, EMULSION INTRAVENOUS at 11:14

## 2024-01-31 RX ADMIN — LIDOCAINE HYDROCHLORIDE 100 MG: 20 INJECTION, SOLUTION INFILTRATION; PERINEURAL at 11:14

## 2024-01-31 ASSESSMENT — ACTIVITIES OF DAILY LIVING (ADL): ADLS_ACUITY_SCORE: 35

## 2024-01-31 NOTE — ANESTHESIA CARE TRANSFER NOTE
Patient: Emilia Mccormick    Procedure: Procedure(s):  COLONOSCOPY, WITH POLYPECTOMY AND BIOPSY       Diagnosis: Screen for colon cancer [Z12.11]  Diagnosis Additional Information: No value filed.    Anesthesia Type:   General     Note:    Oropharynx: oropharynx clear of all foreign objects and spontaneously breathing  Level of Consciousness: awake and drowsy  Oxygen Supplementation: nasal cannula  Level of Supplemental Oxygen (L/min / FiO2): 4  Independent Airway: airway patency satisfactory and stable  Dentition: dentition unchanged  Vital Signs Stable: post-procedure vital signs reviewed and stable  Report to RN Given: handoff report given  Patient transferred to: Phase II    Handoff Report: Identifed the Patient, Identified the Reponsible Provider, Reviewed the pertinent medical history, Discussed the surgical course, Reviewed Intra-OP anesthesia mangement and issues during anesthesia, Set expectations for post-procedure period and Allowed opportunity for questions and acknowledgement of understanding  Vitals:  Vitals Value Taken Time   BP     Temp     Pulse     Resp     SpO2         Electronically Signed By: JOSE Prescott CRNA  January 31, 2024  11:31 AM

## 2024-01-31 NOTE — LETTER
Emilia Mccormick  [address] [place] [address]      February 7, 2024    Dear Emilia,  This letter is written to inform you of the results of your recent colonoscopy.  Your examination showed polyp(s) in your sigmoid colon and rectum. All polyps were removed in their entirety and sent for review by a pathologist. As you will see on the pathology report below, the tissue(s) were hyperplastic polyps. Your examination was otherwise without abnormality.    Final Diagnosis   Rectosigmoid colon, polyp, biopsy/polypectomy:  -Hyperplastic polyp.     Hyperplastic polyps are entirely benign (non-cancerous) and rarely associated with the development of additional polyps or colorectal cancer.    Given these findings, I recommend that you undergo a repeat colonoscopy in ten years for screening. We will enter you into a recall system so you receive a reminder closer to the time that you are due for repeat examination.     Please remember that this recommendation is made with the understanding that you are not experiencing persistent changes in bowel function, bleeding per rectum, and/or significant abdominal pain. If you experience these symptoms, please contact your primary care provider for a further evaluation.     If you have any questions or concerns about the results of your colonoscopy or the appropriate follow-up, please contact my assistant at [phone]    Sincerely,      Daniel Hu,    [facility]  ___

## 2024-01-31 NOTE — ANESTHESIA POSTPROCEDURE EVALUATION
Patient: Emilia Mccormick    Procedure: Procedure(s):  COLONOSCOPY, WITH POLYPECTOMY AND BIOPSY       Anesthesia Type:  General    Note:  Disposition: Outpatient   Postop Pain Control: Uneventful            Sign Out: Well controlled pain   PONV: No   Neuro/Psych: Uneventful            Sign Out: Acceptable/Baseline neuro status   Airway/Respiratory: Uneventful            Sign Out: Acceptable/Baseline resp. status   CV/Hemodynamics: Uneventful            Sign Out: Acceptable CV status; No obvious hypovolemia; No obvious fluid overload   Other NRE: NONE   DID A NON-ROUTINE EVENT OCCUR? No       Last vitals:  Vitals:    01/31/24 0959   BP: 111/79   Pulse: 86   Resp: 18   Temp: 36.6  C (97.9  F)   SpO2: 98%       Electronically Signed By: JOSE Prescott CRNA  January 31, 2024  11:31 AM

## 2024-01-31 NOTE — H&P
[facility]    Pre-Endoscopy History and Physical     Emilia Mccormick MRN# [id number]   YOB: 1965 Age: 58 year old     Date of Procedure: 1/31/2024  Primary care provider: Shantelle Ornelas  Type of Endoscopy: Colonoscopy with possible biopsy, possible polypectomy  Reason for Procedure: Screening  Type of Anesthesia Anticipated: Conscious Sedation    HPI:    Emilia is a 58 year old female who will be undergoing the above procedure.    Last colonoscopy was 10 years ago, negative.  No blood in stool.  No family history of colon cancer.    A history and physical has been performed. The patient's medications and allergies have been reviewed. The risks and benefits of the procedure and the sedation options and risks were discussed with the patient.  All questions were answered and informed consent was obtained.      She denies a personal or family history of anesthesia complications or bleeding disorders.     Patient Active Problem List   Diagnosis    Idiopathic osteoporosis    Calculus of kidney    Thalassemia minor        Past Medical History:   Diagnosis Date    Arthritis [id number]    Kidney stone     Osteoporosis     Thalassemia     Thyroid disease 1/1/20000    Reslolved, thyroid lobectomy in 2000        Past Surgical History:   Procedure Laterality Date    COLONOSCOPY  2013    ENDOMETRIAL ABLATION      HEAD & NECK SURGERY  2000    Thyroid Lobectomy (Huerthle cell adenoma)    THYROID SURGERY  01/01/2000    lobectomy    TUBAL LIGATION      URETEROSCOPY         Social History     Tobacco Use    Smoking status: Former     Types: Cigarettes    Smokeless tobacco: Never    Tobacco comments:     Started smoking at age 10 - quit smoking 30 years ago. quit NRT - in Jan.    Substance Use Topics    Alcohol use: Yes     Comment: Occasional       Family History   Problem Relation Age of Onset    Other - See Comments Mother         unknown bio mothers's History    Cancer Father          skin    Heart Disease Father     Coronary Artery Disease Father     Substance Abuse Father         Alcoholic, in recovery for long time    Osteoporosis Father     Chronic Obstructive Pulmonary Disease Father         SMOKING    Osteoporosis Paternal Grandmother     Cancer Paternal Grandfather         lung - Smoking    Breast Cancer Paternal Aunt 70    Urolithiasis No family hx of     Clotting Disorder No family hx of     Diabetes No family hx of     Gout No family hx of        Prior to Admission medications    Medication Sig Start Date End Date Taking? Authorizing Provider   Calcium Carb-Magnesium Carb 250-300 MG TABS 2 tablet Orally Twice a day   Yes Reported, Patient   calcium carbonate-vitamin D2 500 mg(1,250mg) -200 unit tablet [CALCIUM CARBONATE-VITAMIN D2 500 MG(1,250MG) -200 UNIT TABLET] Take 1 tablet by mouth 2 (two) times a day. 6/8/15  Yes Provider, Historical   Calcium Polycarbophil (FIBER) 625 MG tablet 2 tablets as needed Orally Three times a day   Yes Reported, Patient   Cholecalciferol (VITAMIN D3) 1.25 MG (60391 UT) TABS 1 tablet Orally Twice a day   Yes Reported, Patient   estradiol (ESTRACE) 0.1 MG/GM vaginal cream Place 1 g vaginally daily For 2 week and then reduced to 2 times every week for 3 months. 11/15/23  Yes Elumba, Shantelle, APRN CNP   Multiple Vitamins-Minerals (CENTRUM ADULTS PO) Orally   Yes Reported, Patient   multivitamin (ONE A DAY) per tablet [MULTIVITAMIN (ONE A DAY) PER TABLET] Take 1 tablet by mouth daily. 6/8/15  Yes Provider, Historical       Allergies   Allergen Reactions    Lexapro [Escitalopram] Unknown    Penicillins Unknown        REVIEW OF SYSTEMS:   5 point ROS negative except as noted above in HPI, including Gen., Resp., CV, GI &  system review.    PHYSICAL EXAM:   /79 (BP Location: Right arm)   Pulse 86   Temp 97.9  F (36.6  C) (Oral)   Resp 18   Ht 1.524 m (5')   Wt 54.9 kg (121 lb)   SpO2 98%   BMI 23.63 kg/m   Estimated body mass index is 23.63 kg/m   as calculated from the following:    Height as of this encounter: 1.524 m (5').    Weight as of this encounter: 54.9 kg (121 lb).   Constitutional: Awake, alert, no acute distress.  Eyes: No scleral icterus.  Conjunctiva are without injection.  ENMT: Mucous membranes moist, dentition and gums are intact.   Neck: Soft, supple, trachea midline.    Endocrine: n/a   Lymphatic: There is no cervical, submandibularadenopathy.  Respiratory: normal effortgs   Cardiovascular: S1, S2  Abdomen: Non-distended, non-tender,  No masses,  Musculoskeletal: No spinal or CVA tenderness. Full range of motion in the upper and lower extremities.    Skin: No skin rashes or lesions to inspection.  No petechia.    Neurologic: alerted and oriented 3x  Psychiatric: The patient's affect is not blunted and mood is appropriate.  DIAGNOSTICS:    Not indicated    IMPRESSION   ASA Class 1 - Healthy patient, no medical problems    PLAN:   Plan for Colonoscopy with possible biopsy, possible polypectomy. We discussed the risks, benefits and alternatives and the patient wished to proceed.  Patient is cleared for the above procedure.    The above has been forwarded to the consulting provider.    Daniel Hu DO  [facility]

## 2024-02-01 ENCOUNTER — MYC MEDICAL ADVICE (OUTPATIENT)
Dept: FAMILY MEDICINE | Facility: CLINIC | Age: 59
End: 2024-02-01
Payer: COMMERCIAL

## 2024-02-01 LAB
PATH REPORT.COMMENTS IMP SPEC: NORMAL
PATH REPORT.COMMENTS IMP SPEC: NORMAL
PATH REPORT.FINAL DX SPEC: NORMAL
PATH REPORT.GROSS SPEC: NORMAL
PATH REPORT.MICROSCOPIC SPEC OTHER STN: NORMAL
PATH REPORT.RELEVANT HX SPEC: NORMAL
PHOTO IMAGE: NORMAL

## 2024-02-01 PROCEDURE — 88305 TISSUE EXAM BY PATHOLOGIST: CPT | Mod: 26

## 2024-03-29 ENCOUNTER — OFFICE VISIT (OUTPATIENT)
Dept: FAMILY MEDICINE | Facility: CLINIC | Age: 59
End: 2024-03-29
Payer: COMMERCIAL

## 2024-03-29 VITALS
DIASTOLIC BLOOD PRESSURE: 62 MMHG | WEIGHT: 126.2 LBS | RESPIRATION RATE: 18 BRPM | HEIGHT: 60 IN | TEMPERATURE: 98.2 F | BODY MASS INDEX: 24.77 KG/M2 | HEART RATE: 62 BPM | SYSTOLIC BLOOD PRESSURE: 100 MMHG | OXYGEN SATURATION: 95 %

## 2024-03-29 DIAGNOSIS — N95.2 VAGINAL ATROPHY: Primary | ICD-10-CM

## 2024-03-29 PROCEDURE — 99213 OFFICE O/P EST LOW 20 MIN: CPT | Performed by: NURSE PRACTITIONER

## 2024-03-29 RX ORDER — ESTRADIOL 0.1 MG/G
1 CREAM VAGINAL
Qty: 42.5 G | Refills: 1 | Status: SHIPPED | OUTPATIENT
Start: 2024-04-01

## 2024-03-29 NOTE — PROGRESS NOTES
Assessment & Plan     Vaginal atrophy  Complete clinical resolution of symptoms   Advised she can decrease to once weekly use and increase back to twice weekly if symptoms return  - reordered estrace   Educated about s/s of genitourinary syndrome of menopause, concerning vulvovaginal symptoms to report  No need to discontinue use past age 65 due to local action of vaginal estrogen   Readdressed need for lubrication with sexual activity and benefits of being on vaginal estrogen to prevent issues                      Subjective   Gil is a 59 year old, presenting for the following health issues:  Follow Up (Follow up from last visit regarding estrace cream.  )      3/29/2024     2:17 PM   Additional Questions   Roomed by IDALIA-LPN   Accompanied by NONE     History of Present Illness       Reason for visit:  Rx for vag atrophy prescribed last visit; 3mo followup    She eats 4 or more servings of fruits and vegetables daily.She consumes 0 sweetened beverage(s) daily.She exercises with enough effort to increase her heart rate 9 or less minutes per day.  She exercises with enough effort to increase her heart rate 4 days per week. She is missing 1 dose(s) of medications per week.  She is not taking prescribed medications regularly due to remembering to take.     #vaginal atrophy   Reports she has had significant improvement in vaginal symptoms   No more dryness  Has been trying to use it twice weekly, sometimes forgets but doesn't notice a big difference using once a week   Denies urinary symptoms, bleeding, or any other new symptoms   Is still not sexually active but is ready for this  States she has been inspecting the tissue and it looks much more pink and normal to her      #precancerous skin lesions  Has had several precancerous lesions removed in the past   Sees derm for a full skin check this year                 Review of Systems  Constitutional, HEENT, cardiovascular, pulmonary, gi and gu systems are negative,  except as otherwise noted.      Objective    /62 (BP Location: Right arm, Patient Position: Sitting, Cuff Size: Adult Small)   Pulse 62   Temp 98.2  F (36.8  C) (Oral)   Resp 18   Ht 1.524 m (5')   Wt 57.2 kg (126 lb 3.2 oz)   LMP  (LMP Unknown)   SpO2 95%   BMI 24.65 kg/m    Body mass index is 24.65 kg/m .  Physical Exam   General: no acute distress, alert and oriented   : pt and provider shared decision making, Deferred until physical in October             Signed Electronically by: Shantelle Ornelas, APRN CNP  Nannette Tipton, RN, BSN   [facility], DNP Student

## 2024-05-31 ENCOUNTER — OFFICE VISIT (OUTPATIENT)
Dept: FAMILY MEDICINE | Facility: CLINIC | Age: 59
End: 2024-05-31
Payer: COMMERCIAL

## 2024-05-31 VITALS
TEMPERATURE: 97.7 F | DIASTOLIC BLOOD PRESSURE: 71 MMHG | HEART RATE: 73 BPM | RESPIRATION RATE: 16 BRPM | OXYGEN SATURATION: 96 % | SYSTOLIC BLOOD PRESSURE: 114 MMHG

## 2024-05-31 DIAGNOSIS — R10.2 SUPRAPUBIC PAIN: Primary | ICD-10-CM

## 2024-05-31 LAB
ALBUMIN UR-MCNC: NEGATIVE MG/DL
APPEARANCE UR: CLEAR
BILIRUB UR QL STRIP: NEGATIVE
COLOR UR AUTO: YELLOW
GLUCOSE UR STRIP-MCNC: NEGATIVE MG/DL
HGB UR QL STRIP: NEGATIVE
KETONES UR STRIP-MCNC: NEGATIVE MG/DL
LEUKOCYTE ESTERASE UR QL STRIP: NEGATIVE
NITRATE UR QL: NEGATIVE
PH UR STRIP: 6 [PH] (ref 5–8)
SP GR UR STRIP: <=1.005 (ref 1–1.03)
UROBILINOGEN UR STRIP-ACNC: 0.2 E.U./DL

## 2024-05-31 PROCEDURE — 99214 OFFICE O/P EST MOD 30 MIN: CPT

## 2024-05-31 PROCEDURE — 81003 URINALYSIS AUTO W/O SCOPE: CPT

## 2024-05-31 NOTE — PROGRESS NOTES
Assessment & Plan     Suprapubic pain  Unclear cause of pain at this time. No diverticulosis on recent exam but cannot rule out. Given one week of symptoms and not isolated to RLQ, seems less likely to be appendicitis. Discussed options with patient including watchful waiting, obtaining CBC to evaluate for leukocytosis, and getting a CT scan. She preferred to go home and will return if symptoms worsen or do not improve and was understanding of the risks of this approach. She does feel that her symptoms are improving so she feels comfortable accepting these risks.  - UA Macroscopic with reflex to Microscopic and Culture - Clinic Collect  - After discussion of risks and benefits, patient opted to return home and observe her symptoms, if they worsen or do not improve she will return  - Return precautions discussed     20 minutes spent by me on the date of the encounter doing chart review, review of test results, interpretation of tests, patient visit, and documentation     No follow-ups on file.    Sharri Kelley MD  [facility] JANAK Cordero is a 59 year old female who presents to clinic today for the following health issues:  Chief Complaint   Patient presents with    Abdominal Pain     Started last Saturday, no urinary concerns, bloated (not gassy), constant cramping pain around belly button, no constipation or diarrhea, no fever     HPI    Abdominal Pain  Usually eats at home, ordered some [employer] food take out and had abdominal pain since then. Diarrhea initially which has resolved but the abdominal pain has continued. Did have a colonoscopy in the last few months and did not have diverticulitis at that time.  Location: suprapubic  Radiation: None.  Pain character: dull and aching  Severity: 3-7 on a scale of 1-10.  Duration: 6 day(s)  Course of Illness: fluctuating initially but now is constant  Exacerbated by: nothing  Relieved by: nothing.  Associated Symptoms: none. Had some  chills earlier in the week but didn't check her temperature. Took a Covid-19 test which was negative.  Female : Not applicable  Surgical History: none    Review of Systems  Constitutional, HEENT, cardiovascular, pulmonary, gi and gu systems are negative, except as otherwise noted.     Objective    /71   Pulse 73   Temp 97.7  F (36.5  C) (Oral)   Resp 16   LMP  (LMP Unknown)   SpO2 96%   Physical Exam   GENERAL: alert and no distress  NECK: no adenopathy, no asymmetry, masses, or scars  RESP: lungs clear to auscultation - no rales, rhonchi or wheezes  CV: regular rate and rhythm, normal S1 S2, no S3 or S4, no murmur, click or rub, no peripheral edema  ABDOMEN: soft, tender in suprapubic area including LLQ and RLQ, no rebound or guarding, no hepatosplenomegaly, no masses and bowel sounds normal  MS: no gross musculoskeletal defects noted, no edema    Results for orders placed or performed in visit on 05/31/24 (from the past 24 hour(s))   UA Macroscopic with reflex to Microscopic and Culture - Clinic Collect    Specimen: Urine, Clean Catch   Result Value Ref Range    Color Urine Yellow Colorless, Straw, Light Yellow, Yellow    Appearance Urine Clear Clear    Glucose Urine Negative Negative mg/dL    Bilirubin Urine Negative Negative    Ketones Urine Negative Negative mg/dL    Specific Gravity Urine <=1.005 1.005 - 1.030    Blood Urine Negative Negative    pH Urine 6.0 5.0 - 8.0    Protein Albumin Urine Negative Negative mg/dL    Urobilinogen Urine 0.2 0.2, 1.0 E.U./dL    Nitrite Urine Negative Negative    Leukocyte Esterase Urine Negative Negative    Narrative    Microscopic not indicated

## 2024-09-10 DIAGNOSIS — R63.2 HYPERPHAGIA: Primary | ICD-10-CM

## 2024-09-10 RX ORDER — PHENTERMINE HYDROCHLORIDE 37.5 MG/1
18.75-37.5 TABLET ORAL
Qty: 90 TABLET | Refills: 1 | Status: SHIPPED | OUTPATIENT
Start: 2024-09-10 | End: 2025-03-09

## 2024-11-18 SDOH — HEALTH STABILITY: PHYSICAL HEALTH: ON AVERAGE, HOW MANY DAYS PER WEEK DO YOU ENGAGE IN MODERATE TO STRENUOUS EXERCISE (LIKE A BRISK WALK)?: 4 DAYS

## 2024-11-18 SDOH — HEALTH STABILITY: PHYSICAL HEALTH: ON AVERAGE, HOW MANY MINUTES DO YOU ENGAGE IN EXERCISE AT THIS LEVEL?: 60 MIN

## 2024-11-18 ASSESSMENT — SOCIAL DETERMINANTS OF HEALTH (SDOH): HOW OFTEN DO YOU GET TOGETHER WITH FRIENDS OR RELATIVES?: ONCE A WEEK

## 2024-11-20 ENCOUNTER — OFFICE VISIT (OUTPATIENT)
Dept: FAMILY MEDICINE | Facility: CLINIC | Age: 59
End: 2024-11-20
Payer: COMMERCIAL

## 2024-11-20 VITALS
RESPIRATION RATE: 18 BRPM | HEIGHT: 60 IN | WEIGHT: 119 LBS | HEART RATE: 74 BPM | TEMPERATURE: 97.8 F | SYSTOLIC BLOOD PRESSURE: 110 MMHG | OXYGEN SATURATION: 100 % | BODY MASS INDEX: 23.36 KG/M2 | DIASTOLIC BLOOD PRESSURE: 68 MMHG

## 2024-11-20 DIAGNOSIS — Z00.00 ROUTINE GENERAL MEDICAL EXAMINATION AT A HEALTH CARE FACILITY: Primary | ICD-10-CM

## 2024-11-20 DIAGNOSIS — M81.8 IDIOPATHIC OSTEOPOROSIS: ICD-10-CM

## 2024-11-20 DIAGNOSIS — D56.3 THALASSEMIA MINOR: ICD-10-CM

## 2024-11-20 DIAGNOSIS — N95.2 VAGINAL ATROPHY: ICD-10-CM

## 2024-11-20 PROCEDURE — 99396 PREV VISIT EST AGE 40-64: CPT | Performed by: NURSE PRACTITIONER

## 2024-11-20 RX ORDER — ESTRADIOL 0.1 MG/G
1 CREAM VAGINAL
Qty: 42.5 G | Refills: 2 | Status: SHIPPED | OUTPATIENT
Start: 2024-11-20

## 2024-11-20 NOTE — PROGRESS NOTES
Preventive Care Visit  [facility]  JOSE Renteria CNP, Family Medicine  Nov 20, 2024      Assessment & Plan     Routine general medical examination at a health care facility  We discussed healthy lifestyle, nutrition, cardiovascular risk reduction, self care, safety, sunscreen, and timing of cancer screening.  Health maintenance screening and immunizations reviewed with the patient.  Follow up yearly for the annual physical.    10/17/2023- Mammo BIRADS 1  9/23/22 - low dose CT normal   9/23/22 - AAA screening negative  5/3/23 - DEXA scan osteoperosis (start fos 11/21/21) dx 8/7/18 (6/2/15)  -holiday from the fosmax.   PAP done 8/17/22 - NILM and HPV hHR negative - See [facility],     Patient instructed to bring in a copy of her healthcare directive    Vaginal atrophy  Initially started on estradiol 1 year ago and in March reduce to once a week discussed today increasing to daily for another 2 weeks and then decreasing to 3 times a week before going down further  Patient still having vaginal itching daily  - estradiol (ESTRACE) 0.1 MG/GM vaginal cream  Dispense: 42.5 g; Refill: 2    More information given on supplements during menopause and patient instructions today    Thalassemia minor  History of thalassemia minor discussed options for repeat and CBC.  Patient opts to not do this today    Idiopathic osteoporosis  Patient with osteoporosis has been holding Fosamax since November 2023  5/3/23 - DEXA scan osteoperosis (start fos 11/21/21) dx 8/7/18 (6/2/15)  We will recheck DEXA scan in May 2025.  Order placed today    She is taking calcium and vitamin D appropriately as well as doing weight lifting  Discussed options to add fortibone -bioactive collagen in the future  - DX Bone Density              Counseling  Appropriate preventive services were addressed with this patient via screening, questionnaire, or discussion as appropriate for fall prevention, nutrition, physical  activity, Tobacco-use cessation, social engagement, weight loss and cognition.  Checklist reviewing preventive services available has been given to the patient.  Reviewed patient's diet, addressing concerns and/or questions.           Refugio Cordero is a 59 year old, presenting for the following:  Physical (No concerns)        11/20/2024     9:54 AM   Additional Questions   Roomed by prabha uribe   Accompanied by self        HPI    Vaginal atrophy - using once/week, has itching   Having urinary leaking - stress incontinence - rare.     Lifting for 15 years - does do strength training   Prot shakes in the morning         Wt Readings from Last 5 Encounters:   11/20/24 54 kg (119 lb)   03/29/24 57.2 kg (126 lb 3.2 oz)   01/31/24 54.9 kg (121 lb)   11/15/23 55.2 kg (121 lb 12.8 oz)   10/04/23 54.7 kg (120 lb 8 oz)   ]  #vaginal atrophy   Reports she has had significant improvement in vaginal symptoms   No more dryness  Has been trying to use it twice weekly, sometimes forgets but doesn't notice a big difference using once a week   Denies urinary symptoms, bleeding, or any other new symptoms   Is still not sexually active but is ready for this  States she has been inspecting the tissue and it looks much more pink and normal to her        #precancerous skin lesions  Has had several precancerous lesions removed in the past   Sees derm for a full skin check this year       Post menopausal: atrophy - feels dryness and feels self awareness down there e- chafing - would like to have sex again -  ()     Hx of ablation - not sure when menopause happened - bone density low at a young age - takes calcium and vitamin D - does weight bearing exercise 3-4 times/week. Fosamax weekly.  No GERD or abd pain.      Patient here for establish care - previous PCP in [place] - pulled info from care everywhere      9/20/22 - Mammo BIRADS 1  9/23/22 - low dose CT normal   9/23/22 - AAA screening negative  5/3/23 - DEXA scan  osteoporosis    PAP done 8/17/22 - NILM and HPV hHR negative - See [facility] documentation only note from 8/17/22 - results from that days' pap in the content of the note.   Colonosocpy ordered but not completed.      Hx of ablation - not sure when menopause happened - bone density low at a young age - takes calcium and vitamin D - does weight bearing exercise 3-4 times/week. Fosamax weekly.  No GERD or abd pain.      Post menopausal: atrophy      Seems derm - has a right abd skin lesion - itchy     The 10-year ASCVD risk score (Mckayla JASSO, et al., 2019) is: 2.5%    Values used to calculate the score:      Age: 59 years      Sex: Female      Is Non- : No      Diabetic: No      Tobacco smoker: No      Systolic Blood Pressure: 110 mmHg      Is BP treated: No      HDL Cholesterol: 59 mg/dL      Total Cholesterol: 191 mg/dL              Health Care Directive  Patient does not have a Health Care Directive: Discussed advance care planning with patient; information given to patient to review.      11/18/2024   General Health   How would you rate your overall physical health? Excellent   Feel stress (tense, anxious, or unable to sleep) Only a little      (!) STRESS CONCERN      11/18/2024   Nutrition   Three or more servings of calcium each day? Yes   Diet: Regular (no restrictions)    Low fat/cholesterol   How many servings of fruit and vegetables per day? 4 or more   How many sweetened beverages each day? 0-1       Multiple values from one day are sorted in reverse-chronological order         11/18/2024   Exercise   Days per week of moderate/strenous exercise 4 days   Average minutes spent exercising at this level 60 min            11/18/2024   Social Factors   Frequency of gathering with friends or relatives Once a week   Worry food won't last until get money to buy more No   Food not last or not have enough money for food? No   Do you have housing? (Housing is defined as stable  permanent housing and does not include staying ouside in a car, in a tent, in an abandoned building, in an overnight shelter, or couch-surfing.) Yes   Are you worried about losing your housing? No   Lack of transportation? No   Unable to get utilities (heat,electricity)? No            11/18/2024   Fall Risk   Fallen 2 or more times in the past year? No    Trouble with walking or balance? No        Patient-reported          11/18/2024   Dental   Dentist two times every year? Yes            11/18/2024   TB Screening   Were you born outside of the [place]? No              Today's PHQ-2 Score:       3/29/2024     6:23 AM   PHQ-2 ( 1999 [employer])   Q1: Little interest or pleasure in doing things 0    Q2: Feeling down, depressed or hopeless 0    PHQ-2 Score 0   Q1: Little interest or pleasure in doing things Not at all   Q2: Feeling down, depressed or hopeless Not at all   PHQ-2 Score 0       Patient-reported         11/18/2024   Substance Use   Alcohol more than 3/day or more than 7/wk No   Do you use any other substances recreationally? No        Social History     Tobacco Use    Smoking status: Former     Current packs/day: 0.00     Types: Cigarettes     Passive exposure: Never    Smokeless tobacco: Never    Tobacco comments:     Started smoking at age 10 - quit smoking 30 years ago. quit NRT - in Jan.    Vaping Use    Vaping status: Never Used   Substance Use Topics    Alcohol use: Yes     Comment: Occasional    Drug use: Not Currently     Types: Marijuana     Comment: Edibles very rarely           10/17/2023   LAST FHS-7 RESULTS   1st degree relative breast or ovarian cancer No   Any relative bilateral breast cancer No   Any male have breast cancer No   Any ONE woman have BOTH breast AND ovarian cancer No   Any woman with breast cancer before 50yrs No   2 or more relatives with breast AND/OR ovarian cancer No   2 or more relatives with breast AND/OR bowel cancer No                     11/18/2024   One time HIV Screening  "  Previous HIV test? No          11/18/2024   STI Screening   New sexual partner(s) since last STI/HIV test? No        History of abnormal Pap smear: No - age 30- 62 PAP with HPV every 5 years recommended        8/17/2022    12:00 PM   PAP / HPV   PAP-ABSTRACT See Scanned Document           This result is from an external source.     ASCVD Risk   The 10-year ASCVD risk score (Mckayla JASSO, et al., 2019) is: 2.5%    Values used to calculate the score:      Age: 59 years      Sex: Female      Is Non- : No      Diabetic: No      Tobacco smoker: No      Systolic Blood Pressure: 110 mmHg      Is BP treated: No      HDL Cholesterol: 59 mg/dL      Total Cholesterol: 191 mg/dL           Reviewed and updated as needed this visit by Provider   Tobacco  Allergies  Meds  Problems  Med Hx  Surg Hx  Fam Hx                  Review of Systems  Constitutional, HEENT, cardiovascular, pulmonary, gi and gu systems are negative, except as otherwise noted.     Objective    Exam  /68 (BP Location: Right arm, Patient Position: Right side, Cuff Size: Adult Regular)   Pulse 74   Temp 97.8  F (36.6  C) (Oral)   Resp 18   Ht 1.525 m (5' 0.04\")   Wt 54 kg (119 lb)   LMP  (LMP Unknown)   SpO2 100%   BMI 23.21 kg/m     Estimated body mass index is 23.21 kg/m  as calculated from the following:    Height as of this encounter: 1.525 m (5' 0.04\").    Weight as of this encounter: 54 kg (119 lb).    Physical Exam  GENERAL: alert and no distress  EYES: Eyes grossly normal to inspection, PERRL and conjunctivae and sclerae normal  HENT: ear canals and TM's normal, nose and mouth without ulcers or lesions  NECK: no adenopathy, no asymmetry, masses, or scars  RESP: lungs clear to auscultation - no rales, rhonchi or wheezes  CV: regular rate and rhythm, normal S1 S2, no S3 or S4, no murmur, click or rub, no peripheral edema  ABDOMEN: soft, nontender, no hepatosplenomegaly, no masses and bowel sounds " normal  MS: no gross musculoskeletal defects noted, no edema  SKIN: no suspicious lesions or rashes  NEURO: Normal strength and tone, mentation intact and speech normal  PSYCH: mentation appears normal, affect normal/bright        Signed Electronically by: JOSE Renteria CNP

## 2024-11-20 NOTE — PATIENT INSTRUCTIONS
Experiencing irritability, mood swings or low mood? These herbal remedies, probiotic supplements, mindfulness approaches and lifestyle tips could help.    APRIL 14, 2024  During perimenopause and menopause, fluctuating levels of oestrogen and other hormones can throw your emotions out of balance, making symptoms like irritability, mood swings and low mood much more common.    The good news is there are things that can help, from natural remedies and supplements to therapies and lifestyle changes, including what and when you eat.      We ll assess the research behind 11 different potential remedies, and explore which mood symptoms they might help with, so you can try those that sound best for you.    1. Regular exercise  Working out at any stage of your life can reduce stress, boost your mood and help you to process things that are making you uptight or anxious. But if you re going through menopause, regular exercise could have even more benefits for your mental wellbeing.    In one study, two groups of menopausal women did either four 50-minute aerobic exercise sessions a week or attended regular health lectures.    After 24 weeks, those in the exercise group had seen greater improvements in a range of menopause symptoms - but particularly when it came to mood swings and irritability.    Aerobic, or cardiovascular, exercise includes any activity that increases your heart rate and breathing, like brisk walking, running, cycling, swimming or dancing.    2. Cognitive behavioural therapy (CBT)  CBT is a kind of talking therapy that aims to change negative patterns of thinking to stop them from overwhelming you and affecting your mood and behaviour.    The National [employer] and Care Excellence (NICE) recommends CBT as a treatment option for women with low mood, and related symptoms, during perimenopause and after menopause. The [place] s Menopause Cristin says CBT can also help with mood swings, anger and  intrusive thoughts.    A review of studies looking at the impact of CBT on menopausal symptoms found that it improved anxiety and depression enough to make a noticeable difference to participants  lives.      Visit the [facility] guide for help finding a CBT therapist.    3. Probiotic supplements  Probiotics are friendly bacteria that can improve your gut health and overall health. Thanks to the complex system of nerve impulses, hormones and immune signals known as the gut-brain axis, certain types of probiotic bacteria can have a significant impact on your mood.    The Better Gut probiotic supplements contain strains of bacteria chosen specifically for their benefits during perimenopause and menopause. Four of these strains have been shown in studies to reduce the risk of anxiety and depression, boost mood and improve mood swings.    They could also help improve your sleep, which is another contributing factor when it comes to low mood and irritability.    To find out what targeted probiotics can do for you, visit [facility]. And for expert guidance on menopause health and nutrition - plus 10% off your first order - join our newsletter community.    4. Ashwagandha  Also known as  ginseng or winter cherry, ashwagandha is a shrub native to [place]. Its root has been a staple of traditional Ayurvedic medicine for thousands of years. More recently, researchers have been studying its potential benefits for menopause symptoms.    One study found that women going through perimenopause saw significant improvements in both low mood and irritability after taking a daily ashwagandha supplement for 8 weeks.    Other studies appear to support this, with participants consistently recording better scores on scales that measure depression, stress and anxiety after taking ashwagandha.    5. The Mediterranean diet  The Mediterranean diet is a healthy eating lifestyle that includes plenty of fresh fruits and vegetables, nuts and  seeds, legumes, wholegrains and extra virgin olive oil, as well as some lean meat and fish. It s also low in refined grains and ultra-processed foods.    Many of the plant foods in the Mediterranean diet - particularly legumes, like beans and pulses, and extra virgin olive oil - contain high levels of compounds called phytoestrogens. These mimic some of the functions of oestrogen in your body.    One study involving postmenopausal women with obesity found that those who stuck closer to the Mediterranean diet, and those who ate lots of legumes, tended to have less severe menopause symptoms.    The study also identified a specific link between a higher intake of extra virgin olive oil and a reduced risk of depressed mood.     6. Omega-3s  Omega-3s are a type of polyunsaturated fat. They re essential fatty acids, meaning your body can t produce them, so you need to get them from food.    The best sources of omega-3s are oily fish like salmon, sardines and mackerel, which you might eat if you follow the Mediterranean diet. You can also get omega-3s from supplements made from fish oil or algae.    Omega-3s are anti-inflammatory and can interact with molecules in your brain that are related to mood.    A large-scale study found that consuming more omega-3s was associated with a significantly lower risk of depression in women who had been through menopause.    7. Limiting refined carbs and sugar  If your mood swings and irritability seem to happen at a similar time each day - perhaps mid-morning or mid-afternoon - it could be due to the food you re eating.    Foods high in refined carbohydrates, including those that contain added or  free  sugars, can cause a spike in your blood sugar levels. This is often followed sometime later by a dip or  crash . These kinds of blood sugar crashes can leave you feeling low on energy, irritable and even angry.    To make things worse, they can also cause sugar and carb cravings, leading  you to snack on more of the same types of food and repeat the whole process.    The kinds of foods and drinks that could cause these blood sugar spikes and crashes include:    white rice, bread and pasta  many breakfast cereals  sugary drinks and fruit juice  biscuits, cakes and chocolate bars  If you re experiencing these symptoms, try smart swaps like wholegrains, beans or pulses for rice, and fruit, nuts, plain yoghurt or high-cocoa chocolate for processed snacks.    8. Mindfulness  Mindfulness is any practice that helps you to stop and experience life  in the moment . It means being aware of what you re experiencing inside and out, including thoughts, emotions and sensations, but without the judgements you might normally attach to what s going on.    Taking time to do this can help to regulate your emotions, which could stop you reacting negatively, as in the case of mood swings.    Meditation, yoga and deep breathing exercises are all examples of mindfulness practices, where you focus your attention on your breathing, or how your body is feeling, and notice the thoughts that drift in and out of your mind.    Studies have shown these techniques can lead to physiological changes too, such as lowering your levels of the stress hormone cortisol.    9. Magnesium  The mineral magnesium is essential for a wide range of processes around your body, including the functioning of your brain s limbic system, which deals with your emotional responses.    Oestrogen helps you to process magnesium. As oestrogen falls during perimenopause and menopause, so do your levels of magnesium.    Not many studies have looked specifically at the impact of magnesium supplements on mood in menopausal women. However, a review of seven clinical trials involving a range of participants, found that taking magnesium significantly reduced their depression symptoms.    Women s recommended magnesium intake is between 310 and 320 mg per day. For  pregnant and lactating mothers, the requirement is a little more. So, if you are considering increasing your magnesium intake, here is a list of the foods rich in this mineral to help you better. I have also added a list of the different forms of magnesium in supplements.      There's no evidence that magnesium from supplements is preferable for health outcomes compared to magnesium sourced from whole foods. Since whole foods provide a variety of key nutrients, it's always a good idea to first prioritize meeting your magnesium needs with whole food as much as possible.    If you find you still need supplemental magnesium, it's important to choose a product with high bioavailability from a reputable brand with high quality standards.    magnesium glycinate is said to positively affect menopausal women since it relieves stress and promotes better sleep. Magnesium malate is also said to be effective in managing chronic fatigue and fibromyalgia symptoms. However, more research is needed to validate its effectiveness in this regard.    The glycinate, malate, or aspartate forms are some of the best absorbed. Steer clear of oral magnesium oxide and sulfate, which have relatively poor bioavailability. Magnesium chloride is a common form of topical magnesium, though topical magnesium remains controversial and its effectiveness hasn't been well-studied.    According to the Endocrine Society, one in two postmenopausal women will have osteoporosis, and most will suffer a fracture in their lifetime. Magnesium deficiency has been found in 84 percent of postmenopausal women with osteoporosis.       Foods Rich in Magnesium  Here are the magnesium-rich foods that menopausal women can consume:    Nuts  Brown rice  Tofu  Almonds  Flaxseed  Sunflower and pumpkin seeds  Sweet corn  Black beans  Bananas  Cashews  Whole grains  Soybean  Green leafy vegetables  Oatmeal      Magnesium Supplements  The magnesium that is found in supplements  is often easier for the human body to absorb. The magnesium formulations that menopausal women can look into include:    Magnesium oxide  Magnesium malate  Magnesium citrate  Magnesium sulfate  Magnesium glycinate  Magnesium chloride      10. Better sleep hygiene  Not getting enough sleep for even a relatively short period can lead to low mood, increased stress and irritability. Other menopause symptoms, including anxiety, pain and night sweats, can contribute to poor sleep.    While getting better sleep is easier said than done, these changes to your bedtime routine - sometimes called  sleep hygiene  - could increase your chances of getting a good night s rest:    Go to bed and get up at the same time each day  Avoid caffeine later in the day  Don t watch TV or use your phone in bed  Keep your room quiet, dark and cool  If you get night sweats, keep water and a fan by your bed  You might also like to read our article looking at herbal remedies and supplements, plus other lifestyle tips, that could help improve your sleep.    11. Basil s Wort  The yellow-flowering plant Basil s wort has been used in traditional  medicine for thousands of years. It has a reputation for treating menopause symptoms including mood issues, and there s some research to support this.    One review of studies looking at herbal remedies for menopause concluded that Sudhir's wort can improve mood disorders and mild to moderate depressive symptoms. Another review also found that it was affective at managing mood changes during menopause.    Basil s wort can interact in an unsafe way with a number of different drugs, including antidepressants. If you re on medication, check with your doctor before taking it.          Summary  If you re experiencing mood swings or low mood during perimenopause or menopause, these natural remedies, supplements, therapies and lifestyle changes may help:    Regular exercise  Cognitive behavioural  therapy  Probiotic supplements  Ashwagandha  The Mediterranean diet  Omega-3s  Limiting refined carbs and sugar  Mindfulness  Magnesium  Better sleep hygiene  Basil s wort  The Better Gut probiotic supplements contain specific strains of bacteria that have been shown to help with low mood, mood swings, anxiety and depression.  They also target a range of other perimenopause and menopause issues, from hot flashes to brain fog, digestive problems to vaginal health.    Bone health:   It's suggested for women to have 1200mg of Calcium and 800 international unit(s) of vitamin D daily   All women should engage in weight bearing exercise to prevent osteoporosis    5 g specific collagen peptides (FORTIBONE ; [employer], [facility], [place])    RCT study findings 2018:   The main outcome of this randomized, double-blinded and placebo-controlled study in postmenopausal women was that specific collagen peptides significantly increased bone mineral density (BMD) in both the lumbar spine and femoral neck. In contrast, no significant changes for these parameters were determined in the placebo group. Considering the decrease in BMD in the control group, subjects in the SCP group showed a 4.2% higher BMD in the spine and a 7.7% higher BMD in the femoral neck, suggesting a clinically relevant effect of the 12-month treatment with SCP  https://www.ncbi.nlm.nih.gov/pmc/articles/SFL2630213/#:~:text=The%20main%20outcome%20of%20this%20randomized,    Follow up after 4 years - 2021  The daily intake of 5 g of specific bioactive collagen peptides resulted in a steady increase in BMD and the T-score   in the spine and femoral neck after 4 years. Further research is needed, specific bioactive collagen peptides (FORTIBONE ) could offer an innovative therapeutic   carri Olson al.    https://www.e-jbm.org/upload/pdf/[email or web address].pdf#:~:text=Background:%20The%20effects%20of%20specific%20collagen    Wrinkles:  Subjects received three  tablets of Richelet Skin Renewal daily.       =    2024 The Better Menopause. *Food supplements are not intended to diagnose, treat, cure or prevent any disease and should not be used as a substitute for a varied diet and healthy lifestyle. While our product may offer benefits for many individuals, we strongly recommend that your diet includes rich sources of natural probiotics, too..    =      As women get older, estrogen levels decrease, and as a result, we experience a change in body composition. Fat distribution begins to shift from the hips and thighs to the intra-abdominal cavity in the form of visceral - or belly - fat. This transition is also marked by rising inflammation levels linked directly to declining estrogen levels. The resulting inflammation greatly contributes to joint and muscular pain, brain fog, and this new belly fat deposition.    Fiber   Consume more than 25 grams of fiber per day. Fiber is a prebiotic, which means it nourishes the health-promoting microbes in your digestive tract, creating a healthy environment where microbes can thrive. By strengthening the good microbes in your body, fiber helps prevent harmful microbes from taking over and disrupting your body s natural systems.    Fiber helps you lose weight because it displaces other less satisfying calories. More fiber-rich foods mean lower insulin levels and slow, easy digestion. When fiber reaches the FCI point inside your gut, between the small and large intestines, it slows down the digestive process and sends your body a signal that it s full. Some of the best sources of fiber-rich foods are berries, beans, whole grains, and fruits and vegetables with their skin on.    Fiber is a prebiotic - and promotes a healthy gut biome. Track your total fiber intake and ensure you are getting at least 25 mg (for women the goal is 25-35 mg/day) . Supplement if needed with a product that contains both soluble and insoluble fiber. This  improves fasting blood sugars which improve insulin resistance. This also contributes to a healthy gut biome which through reduction in leaky gut syndrome, can improve leptin resistance and decrease cortisol levels. Fiber intake also stimulates CCK which is a potent appetite suppressant. 15-21    EAT PROTEIN  Although insulin levels rise initially after eating protein, the long-term effects of eating protein throughout your eating window can lead to a decrease in belly fat and insulin resistance, while also improving leptin resistance and decreasing Ghrelin levels. Balance protein consumption throughout the day and aim for at least 20 - 25g with each meal. 7-9    EAT HEALTHY FATS  Omega-3 rich foods have been found to help lower fasting insulin and cortisol levels and stimulate the production of cholecystokinin, which is a natural appetite suppressant.  Omega-3 rich foods include fatty fish such as salmon, mackerel and sardines, seeds, especially flax, mari and hemp, walnuts, as well as grass fed proteins and omega 3 enriched eggs. Supplementing with a high quality omega-3 supplement is a great way to consume at least one high quality source of omega-3 each day.10-12    Reduce Added Sugar  Consume less than 25 grams of ADDED sugar daily. This DOES NOT include the natural sugars found in fruit, vegetables, and dairy but DOES include honey, agave, maple syrup, etc. (Note that added sugars can be found on the nutritional labels under  Carbohydrates ).    Reducing sugar consumption becomes easier as you focus on eating whole, unprocessed foods and swapping sodas and energy drinks for water or unsweetened iced tea or seltzers.    Review the labels of salad dressings, barbecue sauces, ketchup, becky marinara sauces, and flavored yogurts, often containing large amounts of added sugars. Also, keep an eye out for products labeled  low fat  or  no fat,  as the removed fat has likely been replaced with sugar to improve the  taste!    LIMIT PROCESSED FOODS  Processed food led to inflammation while increasing the risk of developing diseases like arthritis, asthma, cardiovascular disease, stroke, Alzheimer s disease, type 2 diabetes and more.  A diet with few processed foods is less likely to produce leptin resistance and more likely to control chronic inflammation.     PROBIOTICS  Your body, especially your large intestine, contains trillions of microorganisms. The gut microbiota, a colonic bacterial population, plays a role in immunological health, digestion, and other bodily processes. Probiotics are good bacteria that are comparable to those found naturally in the body and are available through nutrition and/or supplementation.  Some foods high in probiotics are yogurt, kimchi, sauerkraut, kefir, tempeh, miso and pickled vegetables.  Incorporating probiotics to promote a healthy gut biome has been shown to lower cortisol levels.       Exercise  Complete 30-45 minutes of cardio in your fat-burning heart rate zone for at least 150 minutes weekly. Use the heart rate chart to find your fat-burning zone or Zone 2.     Use this reference chart to stay within your fat burning heart rate zone!        Zone 2 is low impact and requires a consistent pace at 60-70% of your maximum heart rate. If sustained for 30-45 minutes, it is more effective at fat loss than most other exercises because, at this pace of aerobic endurance, the body taps into fat as its fuel source.    If you have been completely sedentary in the past, then focus on walking for 5-10 minutes a day for the next four weeks. If you regularly participate in high-intensity workouts, use this challenge to try to do exercises that put you in the fat-burning zone.    Reduce Stress  Chronic stress contributes to inflammation through elevated cortisol levels and may cause increased blood pressure, headaches, gastric reflux, depression, and anxiety. Over the long term, chronic stress may  affect your immune system, making you more susceptible to illness, infections, an increased risk for heart disease, and even some forms of cancer. Stress can also affect our relationships, work performance, a general sense of well-being, and quality of life.    Actively lower your cortisol through stress reduction techniques (ex: journaling, prayer, breathing, Fabio Chi, restorative yoga, meditation, sleep hygiene) and work to bring moments of merritt, peace, and calm to your day by incorporating short walks, fresh air, journaling, using a meditation khadar, or sessions with a counselor or therapist. Doing one of these activities, even for 5 minutes throughout the day, counts and can go a long way in helping you manage your stress levels and improve your overall health.    No Alcohol   Refrain from alcohol consumption for the next 28 days. Drinking alcohol can trigger some menopausal symptoms, including hot flashes and night sweats, and it can disrupt your sleep in various ways. Alcohol can also create - or exasperate - mental health issues, including anxiety and depression, which can already be a struggle in midlife. Furthermore, moderate to high levels of alcohol consumption (from more than one drink per day) has been linked to the development of osteoporosis.      Sources:  Yunior LAW. J. (2004). Update on adipocyte hormones: regulation of energy balance and carbohydrate/lipid metabolism. Diabetes, 53 Suppl 1, H568-Q901. https://doi.org/10.2337/diabetes.53.2007.s143  Burke Ramirez M.M., MIGUEL Elliott. & JOHN Contreras. Chronic inflammatory diseases are stimulated by current lifestyle: how diet, stress levels and medication prevent our body from recovering. Nutr Metab (Lond) 9, 32 (2012). https://doi.org/10.1186/6278-9701-1-32  LLOYD Hernandez,  SANJUANA Patiño,  CUCO Mann, JAYCEE Long,  Daniel R, ADITYA Tim,  DR Eliz. (2006) Dietary patterns are associated with biochemical markers of inflammation and endothelial activation  in the Multi-Ethnic Study of Atherosclerosis (FA[place]KNER), The American Journal of Clinical Nutrition, 83(6), P 0224-6285, https://doi.org/10.1093/ajcn/83.6.1369  SOMMER Wells, SOMMER Lennon, SOMMER Roe, ALEX Larsen, BERTO Velasquez, & PRATEEK Healy (2017). Sugar-Sweetened Beverages and Weight Gain in Children and Adults: A Systematic Review from 2013 to 2015 and a Comparison with Previous Studies. Obesity facts, 10(6), 674-693. https://doi.org/10.1159/854288548  SOFIA Ayon, PRATEEK Espinoza, SOMMER Gutierrez, JUAN Saldaña, DEION Ruiz, HERMILO Tobias, LILI Sullivan, & SOMMER Brasher (2017). Changes in Sugar-Sweetened Soda Consumption, Weight, and Waist Circumference: 2-Year Cohort of [place] Women. American journal of public health, 107(11), 9707-3301. https://doi.org/10.2105/AJPH.2017.898476  LILIAM Moore, & MAMIE Walker (2017). Sugars, Sweet Taste Receptors, and Brain Responses. Nutrients, 9(7), 653. https://doi.org/10.3390/dv4400369  PRATEEK Chen., Iglesia, HERMILO FOX., JAMES Block, HERMILO Corbett, & Rashad, RLópez HINTON. (2011). A review of weight control strategies and their effects on the regulation of hormonal balance. Journal of nutrition and metabolism, 2011, 618479. https://doi.org/10.1155/2011/693702  SOMMER Couch, RAMESH Bustos, & FRANCESCO Britt (2009). Changes in muscle mass and strength after menopause. Journal of musculoskeletal & neuronal interactions, 9(4), 186-197.  DEION Staton, RAMESH Atkinson., RAMESH Stubbs., ALONZO Prince, ADOLFO Mitchell, RAMESH Hewitt., MAMIE Chester, SOMMER Monteiro., CARLTON Chopra., Dolly, RAMESH OCASIO., & ESCEO Task Force (2014). The role of dietary protein and vitamin D in maintaining musculoskeletal health in postmenopausal women: a consensus statement from the  Society for Clinical and Economic Aspects of Osteoporosis and Osteoarthritis (ESCEO). Thomas, 79(1), 122-132. https://doi.org/10.1016/j.maturitas.2014.07.005  PRATEEK Chen., HERMILO Paerkh., JAMES Block, HERMILO Corbett, & DEION Solorzano. (2011). A review  of weight control strategies and their effects on the regulation of hormonal balance. Journal of nutrition and metabolism, 2011, 710610. https://doi.org/10.1155/2011/333913  Burke Ramirez M.M., MIGUEL Elliott. & ALOZNO Contreras Chronic inflammatory diseases are stimulated by current lifestyle: how diet, stress levels and medication prevent our body from recovering. Nutr Metab (Lond) 9, 32 (2012). https://doi.org/10.1186/7488-5879-7-32  HERMILO Hull, HERMILO Masters, TYRON Santiago. et al. Long-term aerobic exercise and omega-3 supplementation modulate osteoporosis through inflammatory mechanisms in post-menopausal women: a randomized, repeated measures study. Nutr Metab (Lond) 8, 71 (2011). https://doi.org/10.1186/2428-8772-8-71  PRATEEK Chen., HERMILO Parekh, JAMES Block, HERMILO Corbett, & DEION Solorzano (2011). A review of weight control strategies and their effects on the regulation of hormonal balance. Journal of nutrition and metabolism, 2011, 177865. https://doi.org/10.1155/2011/736990  HERMILO Hull, HERMILO Masters, TYRON Santiago. et al. Long-term aerobic exercise and omega-3 supplementation modulate osteoporosis through inflammatory mechanisms in post-menopausal women: a randomized, repeated measures study. Nutr Metab (Lond) 8, 71 (2011). https://doi.org/10.1186/1374-3624-8-71  Vidya MATA (1999). The role of leptin in human obesity and disease: a review of current evidence. Annals of internal medicine, 130(8), 671-680. https://doi.org/10.7326/2184-2783-971-9-191052193-04541  LILIAM Moore, & MAMIE Walker (2017). Sugars, Sweet Taste Receptors, and Brain Responses. Nutrients, 97, 653. https://doi.org/10.3390/jw9170856  GA Galaviz., SOMMER Sarmiento., Za, W. H., Epperson, S. Q., & MARY Harper. (2014). The impact of soluble dietary fibre on gastric emptying, postprandial blood glucose and insulin in patients with type 2 diabetes. [place] journal of clinical nutrition, 23(2), 210-218.  https://doi.org/10.6133/apjcn.2014.23.2.01  Truong AKHTAR (2006). Neuropeptide Y in normal eating and in genetic and dietary-induced obesity. Philosophical transactions of the Royal Society of [place]. Series B, Biological sciences, 361(4076), 8265-2679. https://doi.org/10.1098/rstb.2006.1855  MAMIE Darby, & ALONZO Betancourt (2004). Fat in the intestine as a regulator of appetite-role of CCK. Physiology & behavior, 83(4), 617-621. https://doi.org/10.1016/j.physbeh.2004.07.031  DONNIE Horn., SOMMER Graves, & MAMIE Ray (2005). Role of cholecystokinin in appetite control and body weight regulation. Obesity reviews : an official journal of the International Association for the Study of Obesity, 6(4), 297-306. https://doi.org/10.1111/j.1467-789X.2005.43960.x  DEION Castrejon., & ADOLFO Bee. (2003). The gut hormone peptide YY regulates appetite. Annals of the [place] Academy of Sciences, 994, 162-168. https://doi.org/10.1111/j.4260-5845.2003.qz17996.x  LILIAM Moore, & MAMIE Walker (2017). Sugars, Sweet Taste Receptors, and Brain Responses. Nutrients, 9(7), 653. https://doi.org/10.3390/km8250668  GA Galaviz., Torsten, MLópez Y., Li, W. H., Epperson, S. Q., & Fang, X. C. (2014). The impact of soluble dietary fibre on gastric emptying, postprandial blood glucose and insulin in patients with type 2 diabetes. [place] journal of clinical nutrition, 23(2), 210-218. https://doi.org/10.6133/apjcn.2014.23.2.01  MOODY Cedeno, Geoffrey K. R., DEION Farmer, MOODY Smith, & DONNIE Gaona. (2018). Improving Diet Quality Is Associated with Decreased Inflammation: Findings from a  Intervention in Postmenopausal Women with Obesity. Journal of the Academy of Nutrition and Dietetics, 118(11), 4532-7204. https://doi.org/10.1016/j.jand.2018.05.014  RAMESH Cummings, HERMILO Dang, & HERMILO Hobbs (2017). Ultra-processed Food Intake and Obesity: What Really Matters for Health-Processing or Nutrient Content?. Current obesity reports, 6(4), 420-431.  https://doi.org/10.1007/d03634-713-1602-3  SOMMER Snyder, MAMIE Meier, & RAMESH Parsons (2012). Chronic inflammation and aging: DNA damage tips the balance. Current opinion in immunology, 24(4), 488-493. https://doi.org/10.1016/j.coi.2012.04.003  Bran RICKETTS (2014). Fast food fever: reviewing the impacts of the Western diet on immunity. Nutrition journal, 13, 61. https://doi.org/10.1186/6790-4010-28-61  LLOYD Hernandez,  SANJUANA Patiño,  CUCO Mann, JAYCEE Long,  RUDDY Sanchez, ADITYA Tim,  DR. Eliz (2006) Dietary patterns are associated with biochemical markers of inflammation and endothelial activation in the Multi-Ethnic Study of Atherosclerosis ([place]), The American Journal of Clinical Nutrition, 83(6), P 9965-2953, https://doi.org/10.1093/ajcn/83.6.1369  CARLTON Tripathi., Rachael S., CECIL Mendes., CARLTON Damon., Roche, H., Kannan, K., . . . THANH Escalante. (2015). Low-grade inflammation, diet composition and health: Current research evidence and its translation. English Journal of Nutrition, 114(7), 999-1012. https://doi:10.1017/V5534819085081296  PRATEEK Chen., HERMILO Parekh, JAMES Block, HERMILO Corbett, & DEION Solorzano (2011). A review of weight control strategies and their effects on the regulation of hormonal balance. Journal of nutrition and metabolism, 2011, 191481. https://doi.org/10.1155/2011/076330  Brain basics: Understanding sleep. (2023). [place] of Neurological Disorders and Stroke, [place] of Health. Retrieved from:https://www.ninds.nih.gov/health-information/public-education/brain-basics/brain-basics-understanding-sleep  Ayon, J. H., LILIAM Bartholomew., MAMIE Brink., JOHN Higginbotham., JOHN Aleman., MOODY Hood., [place], N. F., & MAICOL Mena. (2014). Short sleep duration is associated with decreased serum leptin, increased energy intake and decreased diet quality in postmenopausal women. Obesity ([place] 22(5), E55-E61. https://doi.org/10.1002/mila.09386  NEO Dorado., DEION Rankin, &  NEO Ko (2018). Sleep, Health, and Metabolism in Midlife Women and Menopause: Food for Thought. Obstetrics and gynecology clinics of [place], 45(4), 679-867. https://doi.org/10.1016/j.ogc.2018.07.008              Patient Education   Preventive Care Advice   This is general advice given by our system to help you stay healthy. However, your care team may have specific advice just for you. Please talk to your care team about your preventive care needs.  Nutrition  Eat 5 or more servings of fruits and vegetables each day.  Try wheat bread, brown rice and whole grain pasta (instead of white bread, rice, and pasta).  Get enough calcium and vitamin D. Check the label on foods and aim for 100% of the RDA (recommended daily allowance).  Lifestyle  Exercise at least 150 minutes each week  (30 minutes a day, 5 days a week).  Do muscle strengthening activities 2 days a week. These help control your weight and prevent disease.  No smoking.  Wear sunscreen to prevent skin cancer.  Have a dental exam and cleaning every 6 months.  Yearly exams  See your health care team every year to talk about:  Any changes in your health.  Any medicines your care team has prescribed.  Preventive care, family planning, and ways to prevent chronic diseases.  Shots (vaccines)   HPV shots (up to age 26), if you've never had them before.  Hepatitis B shots (up to age 59), if you've never had them before.  COVID-19 shot: Get this shot when it's due.  Flu shot: Get a flu shot every year.  Tetanus shot: Get a tetanus shot every 10 years.  Pneumococcal, hepatitis A, and RSV shots: Ask your care team if you need these based on your risk.  Shingles shot (for age 50 and up)  General health tests  Diabetes screening:  Starting at age 35, Get screened for diabetes at least every 3 years.  If you are younger than age 35, ask your care team if you should be screened for diabetes.  Cholesterol test: At age 39, start having a cholesterol test every 5  years, or more often if advised.  Bone density scan (DEXA): At age 50, ask your care team if you should have this scan for osteoporosis (brittle bones).  Hepatitis C: Get tested at least once in your life.  STIs (sexually transmitted infections)  Before age 24: Ask your care team if you should be screened for STIs.  After age 24: Get screened for STIs if you're at risk. You are at risk for STIs (including HIV) if:  You are sexually active with more than one person.  You don't use condoms every time.  You or a partner was diagnosed with a sexually transmitted infection.  If you are at risk for HIV, ask about PrEP medicine to prevent HIV.  Get tested for HIV at least once in your life, whether you are at risk for HIV or not.  Cancer screening tests  Cervical cancer screening: If you have a cervix, begin getting regular cervical cancer screening tests starting at age 21.  Breast cancer scan (mammogram): If you've ever had breasts, begin having regular mammograms starting at age 40. This is a scan to check for breast cancer.  Colon cancer screening: It is important to start screening for colon cancer at age 45.  Have a colonoscopy test every 10 years (or more often if you're at risk) Or, ask your provider about stool tests like a FIT test every year or Cologuard test every 3 years.  To learn more about your testing options, visit:   .  For help making a decision, visit:   https://bit.ly/xq72517.  Prostate cancer screening test: If you have a prostate, ask your care team if a prostate cancer screening test (PSA) at age 55 is right for you.  Lung cancer screening: If you are a current or former smoker ages 50 to 80, ask your care team if ongoing lung cancer screenings are right for you.  For informational purposes only. Not to replace the advice of your health care provider. Copyright   2023 [employer]. All rights reserved. Clinically reviewed by the [facility] Transitions Program. ASSURED INFORMATION SECURITY 364651 -  REV 01/24.

## 2024-11-21 ENCOUNTER — PATIENT OUTREACH (OUTPATIENT)
Dept: CARE COORDINATION | Facility: CLINIC | Age: 59
End: 2024-11-21
Payer: COMMERCIAL

## 2024-12-10 ENCOUNTER — HOSPITAL ENCOUNTER (OUTPATIENT)
Dept: GENERAL RADIOLOGY | Facility: HOSPITAL | Age: 59
Discharge: HOME | End: 2024-12-10
Attending: PHYSICIAN ASSISTANT
Payer: COMMERCIAL

## 2024-12-10 ENCOUNTER — OFFICE VISIT (OUTPATIENT)
Dept: PHYSICAL MEDICINE AND REHAB | Facility: CLINIC | Age: 59
End: 2024-12-10
Payer: COMMERCIAL

## 2024-12-10 VITALS — SYSTOLIC BLOOD PRESSURE: 132 MMHG | HEART RATE: 76 BPM | DIASTOLIC BLOOD PRESSURE: 65 MMHG

## 2024-12-10 DIAGNOSIS — M54.50 LUMBAR SPINE PAIN: Primary | ICD-10-CM

## 2024-12-10 DIAGNOSIS — M54.50 LUMBAR SPINE PAIN: ICD-10-CM

## 2024-12-10 PROCEDURE — 72100 X-RAY EXAM L-S SPINE 2/3 VWS: CPT

## 2024-12-10 ASSESSMENT — PAIN SCALES - GENERAL: PAINLEVEL_OUTOF10: MODERATE PAIN (5)

## 2024-12-10 NOTE — PROGRESS NOTES
Assessment:   Emilia Mccormick is a 59 year old y.o. female with past medical history significant for osteoporosis, history of calculus of kidney, thalassemia minor who presents today for follow-up regarding acute centralized low back pain.  Pain began 9 days ago after cooking and cleaning.  Will check an x-ray to rule out fracture given osteoporosis.  She does not have any radicular symptoms.   She is neurologically intact.       Plan:     A shared decision making plan was used.  The patient's values and choices were respected.  The following represents what was discussed and decided upon by the physician assistant and the patient.      1.  DIAGNOSTIC TESTS:    - I ordered an x-ray lumbar spine as an initial evaluation.    2.  PHYSICAL THERAPY: No physical therapy was ordered.  Once fracture is ruled out we could consider physical therapy if pain persists.    3.  MEDICATIONS: Patient can continue using Aleve as needed.  She does not feel that she needs any other pain relief medications at this point.    4.  INTERVENTIONS:  No interventions were ordered today.    5.  PATIENT EDUCATION: Patient is in agreement the above plan.  All questions were answered.    6.  FOLLOW-UP: I will send the patient a [employer] message with her x-ray results.  As long as there is no fracture I would recommend NSAIDs/acetaminophen, ice, rest, gentle movement.  If there is a fracture I recommend MRI to confirm acuity conservative care.    Subjective:     Emilia Mccormick is a 59 year old female who presents today for follow-up regarding acute low back pain.  Patient reports that pain began 9 days ago on December 1, 2024 after cooking and cleaning.  She initially felt a soreness in the lower back.  Pain became more severe 2 days ago after being in a hot tub.  She took Aleve yesterday which was somewhat helpful.  Patient has a history of osteoporosis.  She took Fosamax for 5 years.  She is on a break from medication for osteo  porosis for the past 6 to 12 months.    Patient complains of centralized lower back pain.  Pain is in the lower lumbar region.  She denies any pain radiating in the buttocks or down the legs.  Denies any numbness, tingling, weakness of the legs.  Denies loss of bowel or bladder control.  Denies fevers.  She rates her pain today as a 5 out of 10.  At its worst it is a 9 out of 10.  At its best it is a 5 out of 10.  Pain is aggravated with immobility and being in a hot tub.  Pain is alleviated with walking and taking Aleve.    Treatment to date:  - Physical therapy for neck pain 2022  - Aleve yesterday was helpful    Review of Systems:  Negative for numbness/tingling, weakness, loss of bowel/bladder control, inability to urinate, headache, pain much worse at night, trip/stumble/falls, difficulty swallowing, difficulty with hand skills, fevers, unintentional weight loss.     Objective:   CONSTITUTIONAL:  Vital signs as above.  No acute distress.  The patient is well nourished and well groomed.    PSYCHIATRIC:  The patient is awake, alert, oriented to person, place and time.  The patient is answering questions appropriately with clear speech.  Normal affect.  HEENT: Normocephalic, atraumatic.  Sclera clear.    SKIN:  Skin over the face, posterior torso, bilateral upper and lower extremities is clean, dry, intact without rashes.  VASCULAR: No significant lower extremity edema.  MUSCULOSKELETAL:  Gait is non-antalgic.  The patient is able to heel and toe walk without any difficulty.  No significant midline lumbar tenderness.  Significant tenderness palpation bilateral lower lumbar paraspinous muscles.   The patient has 5/5 strength for the bilateral hip flexors, knee flexors/extensors, ankle dorsiflexors/plantar flexors, ankle evertors/invertors.    NEUROLOGICAL: 2+ patellar, achilles reflexes which are symmetric bilaterally.  No ankle clonus bilaterally.  Sensation to light touch is intact in the bilateral L4, L5, and S1  dermatomes.

## 2024-12-10 NOTE — LETTER
12/10/2024      Emilia Mccormick  [address] [place] [address]      Dear Colleague,    Thank you for referring your patient, Emilia Mccormick, to the [facility]. Please see a copy of my visit note below.    Assessment:   Emilia Mccormick is a 59 year old y.o. female with past medical history significant for osteoporosis, history of calculus of kidney, thalassemia minor who presents today for follow-up regarding acute centralized low back pain.  Pain began 9 days ago after cooking and cleaning.  Will check an x-ray to rule out fracture given osteoporosis.  She does not have any radicular symptoms.   She is neurologically intact.       Plan:     A shared decision making plan was used.  The patient's values and choices were respected.  The following represents what was discussed and decided upon by the physician assistant and the patient.      1.  DIAGNOSTIC TESTS:    - I ordered an x-ray lumbar spine as an initial evaluation.    2.  PHYSICAL THERAPY: No physical therapy was ordered.  Once fracture is ruled out we could consider physical therapy if pain persists.    3.  MEDICATIONS: Patient can continue using Aleve as needed.  She does not feel that she needs any other pain relief medications at this point.    4.  INTERVENTIONS:  No interventions were ordered today.    5.  PATIENT EDUCATION: Patient is in agreement the above plan.  All questions were answered.    6.  FOLLOW-UP: I will send the patient a [email or web address] message with her x-ray results.  As long as there is no fracture I would recommend NSAIDs/acetaminophen, ice, rest, gentle movement.  If there is a fracture I recommend MRI to confirm acuity conservative care.    Subjective:     Emilia Mccormick is a 59 year old female who presents today for follow-up regarding acute low back pain.  Patient reports that pain began 9 days ago on December 1, 2024 after cooking and cleaning.  She initially felt a soreness  in the lower back.  Pain became more severe 2 days ago after being in a hot tub.  She took Aleve yesterday which was somewhat helpful.  Patient has a history of osteoporosis.  She took Fosamax for 5 years.  She is on a break from medication for osteo porosis for the past 6 to 12 months.    Patient complains of centralized lower back pain.  Pain is in the lower lumbar region.  She denies any pain radiating in the buttocks or down the legs.  Denies any numbness, tingling, weakness of the legs.  Denies loss of bowel or bladder control.  Denies fevers.  She rates her pain today as a 5 out of 10.  At its worst it is a 9 out of 10.  At its best it is a 5 out of 10.  Pain is aggravated with immobility and being in a hot tub.  Pain is alleviated with walking and taking Aleve.    Treatment to date:  - Physical therapy for neck pain 2022  - Aleve yesterday was helpful    Review of Systems:  Negative for numbness/tingling, weakness, loss of bowel/bladder control, inability to urinate, headache, pain much worse at night, trip/stumble/falls, difficulty swallowing, difficulty with hand skills, fevers, unintentional weight loss.     Objective:   CONSTITUTIONAL:  Vital signs as above.  No acute distress.  The patient is well nourished and well groomed.    PSYCHIATRIC:  The patient is awake, alert, oriented to person, place and time.  The patient is answering questions appropriately with clear speech.  Normal affect.  HEENT: Normocephalic, atraumatic.  Sclera clear.    SKIN:  Skin over the face, posterior torso, bilateral upper and lower extremities is clean, dry, intact without rashes.  VASCULAR: No significant lower extremity edema.  MUSCULOSKELETAL:  Gait is non-antalgic.  The patient is able to heel and toe walk without any difficulty.  No significant midline lumbar tenderness.  Significant tenderness palpation bilateral lower lumbar paraspinous muscles.   The patient has 5/5 strength for the bilateral hip flexors, knee  flexors/extensors, ankle dorsiflexors/plantar flexors, ankle evertors/invertors.    NEUROLOGICAL: 2+ patellar, achilles reflexes which are symmetric bilaterally.  No ankle clonus bilaterally.  Sensation to light touch is intact in the bilateral L4, L5, and S1 dermatomes.             Again, thank you for allowing me to participate in the care of your patient.        Sincerely,        Genet Reynolds PA-C

## 2024-12-10 NOTE — PATIENT INSTRUCTIONS
[facility] Scheduling    Please call [phone] to schedule your image(s) (select option#1). There are 3 different locations, see below. You can do walk-in visits for xray only images if you want.     [facility]  [address]  [address] [facility] [place]  [address], [address]  [place] [address]

## 2025-02-03 ENCOUNTER — ANESTHESIA (OUTPATIENT)
Dept: SURGERY | Facility: CLINIC | Age: 60
End: 2025-02-03
Payer: COMMERCIAL

## 2025-02-03 ENCOUNTER — HOSPITAL ENCOUNTER (INPATIENT)
Facility: CLINIC | Age: 60
LOS: 3 days | Discharge: HOME | End: 2025-02-06
Attending: EMERGENCY MEDICINE | Admitting: FAMILY MEDICINE
Payer: COMMERCIAL

## 2025-02-03 ENCOUNTER — ANESTHESIA EVENT (OUTPATIENT)
Dept: SURGERY | Facility: CLINIC | Age: 60
End: 2025-02-03
Payer: COMMERCIAL

## 2025-02-03 ENCOUNTER — APPOINTMENT (OUTPATIENT)
Dept: CT IMAGING | Facility: CLINIC | Age: 60
DRG: 398 | End: 2025-02-03
Payer: COMMERCIAL

## 2025-02-03 DIAGNOSIS — K35.32 PERFORATED APPENDICITIS: ICD-10-CM

## 2025-02-03 DIAGNOSIS — K35.201 ACUTE APPENDICITIS WITH PERFORATION AND GENERALIZED PERITONITIS, WITHOUT ABSCESS OR GANGRENE: Primary | ICD-10-CM

## 2025-02-03 PROBLEM — N95.2 VAGINAL ATROPHY: Status: ACTIVE | Noted: 2024-03-29

## 2025-02-03 PROBLEM — F51.01 PRIMARY INSOMNIA: Status: ACTIVE | Noted: 2025-02-03

## 2025-02-03 LAB
ALBUMIN SERPL BCG-MCNC: 4.6 G/DL (ref 3.5–5.2)
ALBUMIN UR-MCNC: 30 MG/DL
ALP SERPL-CCNC: 90 U/L (ref 40–150)
ALT SERPL W P-5'-P-CCNC: 21 U/L (ref 0–50)
ANION GAP SERPL CALCULATED.3IONS-SCNC: 12 MMOL/L (ref 7–15)
APPEARANCE UR: CLEAR
AST SERPL W P-5'-P-CCNC: 23 U/L (ref 0–45)
BILIRUB DIRECT SERPL-MCNC: <0.2 MG/DL (ref 0–0.3)
BILIRUB SERPL-MCNC: 0.7 MG/DL
BILIRUB UR QL STRIP: NEGATIVE
BUN SERPL-MCNC: 20.4 MG/DL (ref 8–23)
CALCIUM SERPL-MCNC: 10.1 MG/DL (ref 8.8–10.4)
CHLORIDE SERPL-SCNC: 102 MMOL/L (ref 98–107)
COLOR UR AUTO: YELLOW
CREAT SERPL-MCNC: 0.75 MG/DL (ref 0.51–0.95)
EGFRCR SERPLBLD CKD-EPI 2021: 90 ML/MIN/1.73M2
ERYTHROCYTE [DISTWIDTH] IN BLOOD BY AUTOMATED COUNT: 16.8 % (ref 10–15)
GLUCOSE SERPL-MCNC: 152 MG/DL (ref 70–99)
GLUCOSE UR STRIP-MCNC: NEGATIVE MG/DL
HCO3 SERPL-SCNC: 25 MMOL/L (ref 22–29)
HCT VFR BLD AUTO: 37.6 % (ref 35–47)
HGB BLD-MCNC: 12.3 G/DL (ref 11.7–15.7)
HGB UR QL STRIP: ABNORMAL
KETONES UR STRIP-MCNC: 20 MG/DL
LEUKOCYTE ESTERASE UR QL STRIP: NEGATIVE
LIPASE SERPL-CCNC: 11 U/L (ref 13–60)
MAGNESIUM SERPL-MCNC: 1.9 MG/DL (ref 1.7–2.3)
MCH RBC QN AUTO: 21 PG (ref 26.5–33)
MCHC RBC AUTO-ENTMCNC: 32.7 G/DL (ref 31.5–36.5)
MCV RBC AUTO: 64 FL (ref 78–100)
MUCOUS THREADS #/AREA URNS LPF: PRESENT /LPF
NITRATE UR QL: NEGATIVE
PH UR STRIP: 6 [PH] (ref 5–7)
PLAT MORPH BLD: NORMAL
PLATELET # BLD AUTO: 207 10E3/UL (ref 150–450)
POTASSIUM SERPL-SCNC: 4.1 MMOL/L (ref 3.4–5.3)
PROT SERPL-MCNC: 7.9 G/DL (ref 6.4–8.3)
RADIOLOGIST FLAGS: ABNORMAL
RBC # BLD AUTO: 5.86 10E6/UL (ref 3.8–5.2)
RBC MORPH BLD: NORMAL
RBC URINE: 5 /HPF
SODIUM SERPL-SCNC: 139 MMOL/L (ref 135–145)
SP GR UR STRIP: 1.03 (ref 1–1.03)
SQUAMOUS EPITHELIAL: 1 /HPF
TSH SERPL DL<=0.005 MIU/L-ACNC: 0.68 UIU/ML (ref 0.3–4.2)
UROBILINOGEN UR STRIP-MCNC: <2 MG/DL
WBC # BLD AUTO: 22.6 10E3/UL (ref 4–11)
WBC URINE: 1 /HPF

## 2025-02-03 PROCEDURE — 96375 TX/PRO/DX INJ NEW DRUG ADDON: CPT

## 2025-02-03 PROCEDURE — 81001 URINALYSIS AUTO W/SCOPE: CPT | Performed by: EMERGENCY MEDICINE

## 2025-02-03 PROCEDURE — 99285 EMERGENCY DEPT VISIT HI MDM: CPT | Mod: 25

## 2025-02-03 PROCEDURE — 0DTJ4ZZ RESECTION OF APPENDIX, PERCUTANEOUS ENDOSCOPIC APPROACH: ICD-10-PCS | Performed by: SURGERY

## 2025-02-03 PROCEDURE — 999N000141 HC STATISTIC PRE-PROCEDURE NURSING ASSESSMENT: Performed by: SURGERY

## 2025-02-03 PROCEDURE — 258N000001 HC RX 258: Performed by: SURGERY

## 2025-02-03 PROCEDURE — 36415 COLL VENOUS BLD VENIPUNCTURE: CPT

## 2025-02-03 PROCEDURE — 88304 TISSUE EXAM BY PATHOLOGIST: CPT | Mod: TC | Performed by: SURGERY

## 2025-02-03 PROCEDURE — 250N000011 HC RX IP 250 OP 636: Performed by: NURSE ANESTHETIST, CERTIFIED REGISTERED

## 2025-02-03 PROCEDURE — 83735 ASSAY OF MAGNESIUM: CPT

## 2025-02-03 PROCEDURE — 120N000001 HC R&B MED SURG/OB

## 2025-02-03 PROCEDURE — 250N000011 HC RX IP 250 OP 636: Performed by: FAMILY MEDICINE

## 2025-02-03 PROCEDURE — 258N000003 HC RX IP 258 OP 636: Performed by: NURSE ANESTHETIST, CERTIFIED REGISTERED

## 2025-02-03 PROCEDURE — 85041 AUTOMATED RBC COUNT: CPT

## 2025-02-03 PROCEDURE — 83690 ASSAY OF LIPASE: CPT

## 2025-02-03 PROCEDURE — 250N000013 HC RX MED GY IP 250 OP 250 PS 637: Performed by: SURGERY

## 2025-02-03 PROCEDURE — 258N000003 HC RX IP 258 OP 636: Performed by: SURGERY

## 2025-02-03 PROCEDURE — 82374 ASSAY BLOOD CARBON DIOXIDE: CPT

## 2025-02-03 PROCEDURE — 370N000017 HC ANESTHESIA TECHNICAL FEE, PER MIN: Performed by: SURGERY

## 2025-02-03 PROCEDURE — 250N000011 HC RX IP 250 OP 636: Performed by: EMERGENCY MEDICINE

## 2025-02-03 PROCEDURE — 74177 CT ABD & PELVIS W/CONTRAST: CPT

## 2025-02-03 PROCEDURE — 250N000011 HC RX IP 250 OP 636: Performed by: SURGERY

## 2025-02-03 PROCEDURE — 85018 HEMOGLOBIN: CPT

## 2025-02-03 PROCEDURE — 99222 1ST HOSP IP/OBS MODERATE 55: CPT | Mod: 57 | Performed by: SURGERY

## 2025-02-03 PROCEDURE — 250N000025 HC SEVOFLURANE, PER MIN: Performed by: SURGERY

## 2025-02-03 PROCEDURE — 250N000011 HC RX IP 250 OP 636

## 2025-02-03 PROCEDURE — 84155 ASSAY OF PROTEIN SERUM: CPT

## 2025-02-03 PROCEDURE — 96374 THER/PROPH/DIAG INJ IV PUSH: CPT | Mod: 59

## 2025-02-03 PROCEDURE — 99223 1ST HOSP IP/OBS HIGH 75: CPT | Performed by: FAMILY MEDICINE

## 2025-02-03 PROCEDURE — 360N000076 HC SURGERY LEVEL 3, PER MIN: Performed by: SURGERY

## 2025-02-03 PROCEDURE — 84443 ASSAY THYROID STIM HORMONE: CPT | Performed by: FAMILY MEDICINE

## 2025-02-03 PROCEDURE — 250N000009 HC RX 250: Performed by: NURSE ANESTHETIST, CERTIFIED REGISTERED

## 2025-02-03 PROCEDURE — 80048 BASIC METABOLIC PNL TOTAL CA: CPT

## 2025-02-03 PROCEDURE — 710N000010 HC RECOVERY PHASE 1, LEVEL 2, PER MIN: Performed by: SURGERY

## 2025-02-03 PROCEDURE — 82435 ASSAY OF BLOOD CHLORIDE: CPT

## 2025-02-03 PROCEDURE — 44970 LAPAROSCOPY APPENDECTOMY: CPT | Performed by: SURGERY

## 2025-02-03 PROCEDURE — 250N000011 HC RX IP 250 OP 636: Performed by: ANESTHESIOLOGY

## 2025-02-03 PROCEDURE — 272N000001 HC OR GENERAL SUPPLY STERILE: Performed by: SURGERY

## 2025-02-03 RX ORDER — ACETAMINOPHEN 325 MG/1
650 TABLET ORAL EVERY 4 HOURS PRN
Status: DISCONTINUED | OUTPATIENT
Start: 2025-02-03 | End: 2025-02-03

## 2025-02-03 RX ORDER — SODIUM CHLORIDE, SODIUM LACTATE, POTASSIUM CHLORIDE, CALCIUM CHLORIDE 600; 310; 30; 20 MG/100ML; MG/100ML; MG/100ML; MG/100ML
INJECTION, SOLUTION INTRAVENOUS CONTINUOUS
Status: DISCONTINUED | OUTPATIENT
Start: 2025-02-03 | End: 2025-02-03 | Stop reason: HOSPADM

## 2025-02-03 RX ORDER — PIPERACILLIN SODIUM, TAZOBACTAM SODIUM 3; .375 G/15ML; G/15ML
3.38 INJECTION, POWDER, LYOPHILIZED, FOR SOLUTION INTRAVENOUS ONCE
Status: COMPLETED | OUTPATIENT
Start: 2025-02-03 | End: 2025-02-03

## 2025-02-03 RX ORDER — LIDOCAINE HYDROCHLORIDE AND EPINEPHRINE 10; 10 MG/ML; UG/ML
INJECTION, SOLUTION INFILTRATION; PERINEURAL
Status: DISCONTINUED
Start: 2025-02-03 | End: 2025-02-03 | Stop reason: HOSPADM

## 2025-02-03 RX ORDER — FENTANYL CITRATE 50 UG/ML
50 INJECTION, SOLUTION INTRAMUSCULAR; INTRAVENOUS EVERY 5 MIN PRN
Status: DISCONTINUED | OUTPATIENT
Start: 2025-02-03 | End: 2025-02-03 | Stop reason: HOSPADM

## 2025-02-03 RX ORDER — PROPOFOL 10 MG/ML
INJECTION, EMULSION INTRAVENOUS CONTINUOUS PRN
Status: DISCONTINUED | OUTPATIENT
Start: 2025-02-03 | End: 2025-02-03

## 2025-02-03 RX ORDER — ACETAMINOPHEN 325 MG/1
975 TABLET ORAL EVERY 8 HOURS
Status: DISCONTINUED | OUTPATIENT
Start: 2025-02-04 | End: 2025-02-06 | Stop reason: HOSPADM

## 2025-02-03 RX ORDER — HYDROMORPHONE HCL IN WATER/PF 6 MG/30 ML
0.4 PATIENT CONTROLLED ANALGESIA SYRINGE INTRAVENOUS
Status: DISCONTINUED | OUTPATIENT
Start: 2025-02-03 | End: 2025-02-06 | Stop reason: HOSPADM

## 2025-02-03 RX ORDER — KETOROLAC TROMETHAMINE 30 MG/ML
INJECTION, SOLUTION INTRAMUSCULAR; INTRAVENOUS PRN
Status: DISCONTINUED | OUTPATIENT
Start: 2025-02-03 | End: 2025-02-03

## 2025-02-03 RX ORDER — LIDOCAINE HYDROCHLORIDE 10 MG/ML
INJECTION, SOLUTION INFILTRATION; PERINEURAL PRN
Status: DISCONTINUED | OUTPATIENT
Start: 2025-02-03 | End: 2025-02-03

## 2025-02-03 RX ORDER — NALOXONE HYDROCHLORIDE 0.4 MG/ML
0.2 INJECTION, SOLUTION INTRAMUSCULAR; INTRAVENOUS; SUBCUTANEOUS
Status: DISCONTINUED | OUTPATIENT
Start: 2025-02-03 | End: 2025-02-06 | Stop reason: HOSPADM

## 2025-02-03 RX ORDER — HYDROMORPHONE HCL IN WATER/PF 6 MG/30 ML
0.4 PATIENT CONTROLLED ANALGESIA SYRINGE INTRAVENOUS
Status: DISCONTINUED | OUTPATIENT
Start: 2025-02-03 | End: 2025-02-03

## 2025-02-03 RX ORDER — PROPOFOL 10 MG/ML
INJECTION, EMULSION INTRAVENOUS PRN
Status: DISCONTINUED | OUTPATIENT
Start: 2025-02-03 | End: 2025-02-03

## 2025-02-03 RX ORDER — SODIUM CHLORIDE, SODIUM LACTATE, POTASSIUM CHLORIDE, CALCIUM CHLORIDE 600; 310; 30; 20 MG/100ML; MG/100ML; MG/100ML; MG/100ML
INJECTION, SOLUTION INTRAVENOUS CONTINUOUS PRN
Status: DISCONTINUED | OUTPATIENT
Start: 2025-02-03 | End: 2025-02-03

## 2025-02-03 RX ORDER — SODIUM CHLORIDE 9 MG/ML
INJECTION, SOLUTION INTRAVENOUS CONTINUOUS
Status: DISCONTINUED | OUTPATIENT
Start: 2025-02-03 | End: 2025-02-04

## 2025-02-03 RX ORDER — ONDANSETRON 4 MG/1
4 TABLET, ORALLY DISINTEGRATING ORAL EVERY 30 MIN PRN
Status: DISCONTINUED | OUTPATIENT
Start: 2025-02-03 | End: 2025-02-03 | Stop reason: HOSPADM

## 2025-02-03 RX ORDER — FENTANYL CITRATE 50 UG/ML
25 INJECTION, SOLUTION INTRAMUSCULAR; INTRAVENOUS EVERY 5 MIN PRN
Status: DISCONTINUED | OUTPATIENT
Start: 2025-02-03 | End: 2025-02-03 | Stop reason: HOSPADM

## 2025-02-03 RX ORDER — NALOXONE HYDROCHLORIDE 0.4 MG/ML
0.4 INJECTION, SOLUTION INTRAMUSCULAR; INTRAVENOUS; SUBCUTANEOUS
Status: DISCONTINUED | OUTPATIENT
Start: 2025-02-03 | End: 2025-02-06 | Stop reason: HOSPADM

## 2025-02-03 RX ORDER — HYDROMORPHONE HYDROCHLORIDE 1 MG/ML
0.5 INJECTION, SOLUTION INTRAMUSCULAR; INTRAVENOUS; SUBCUTANEOUS ONCE
Status: COMPLETED | OUTPATIENT
Start: 2025-02-03 | End: 2025-02-03

## 2025-02-03 RX ORDER — OXYCODONE HYDROCHLORIDE 5 MG/1
5 TABLET ORAL EVERY 4 HOURS PRN
Status: DISCONTINUED | OUTPATIENT
Start: 2025-02-03 | End: 2025-02-06 | Stop reason: HOSPADM

## 2025-02-03 RX ORDER — NALOXONE HYDROCHLORIDE 0.4 MG/ML
0.2 INJECTION, SOLUTION INTRAMUSCULAR; INTRAVENOUS; SUBCUTANEOUS
Status: DISCONTINUED | OUTPATIENT
Start: 2025-02-03 | End: 2025-02-03

## 2025-02-03 RX ORDER — SODIUM CHLORIDE 9 MG/ML
INJECTION, SOLUTION INTRAVENOUS CONTINUOUS
Status: DISCONTINUED | OUTPATIENT
Start: 2025-02-03 | End: 2025-02-03

## 2025-02-03 RX ORDER — ACETAMINOPHEN 650 MG/1
650 SUPPOSITORY RECTAL EVERY 4 HOURS PRN
Status: DISCONTINUED | OUTPATIENT
Start: 2025-02-03 | End: 2025-02-03

## 2025-02-03 RX ORDER — HYDROMORPHONE HCL IN WATER/PF 6 MG/30 ML
0.2 PATIENT CONTROLLED ANALGESIA SYRINGE INTRAVENOUS EVERY 5 MIN PRN
Status: DISCONTINUED | OUTPATIENT
Start: 2025-02-03 | End: 2025-02-03 | Stop reason: HOSPADM

## 2025-02-03 RX ORDER — LIDOCAINE 40 MG/G
CREAM TOPICAL
Status: DISCONTINUED | OUTPATIENT
Start: 2025-02-03 | End: 2025-02-06 | Stop reason: HOSPADM

## 2025-02-03 RX ORDER — PIPERACILLIN SODIUM, TAZOBACTAM SODIUM 3; .375 G/15ML; G/15ML
3.38 INJECTION, POWDER, LYOPHILIZED, FOR SOLUTION INTRAVENOUS EVERY 8 HOURS
Status: DISCONTINUED | OUTPATIENT
Start: 2025-02-04 | End: 2025-02-04

## 2025-02-03 RX ORDER — NALOXONE HYDROCHLORIDE 0.4 MG/ML
0.1 INJECTION, SOLUTION INTRAMUSCULAR; INTRAVENOUS; SUBCUTANEOUS
Status: DISCONTINUED | OUTPATIENT
Start: 2025-02-03 | End: 2025-02-03 | Stop reason: HOSPADM

## 2025-02-03 RX ORDER — LIDOCAINE HYDROCHLORIDE AND EPINEPHRINE 10; 10 MG/ML; UG/ML
INJECTION, SOLUTION INFILTRATION; PERINEURAL PRN
Status: DISCONTINUED | OUTPATIENT
Start: 2025-02-03 | End: 2025-02-03 | Stop reason: HOSPADM

## 2025-02-03 RX ORDER — LIDOCAINE 40 MG/G
CREAM TOPICAL
Status: DISCONTINUED | OUTPATIENT
Start: 2025-02-03 | End: 2025-02-03

## 2025-02-03 RX ORDER — POLYETHYLENE GLYCOL 3350 17 G/17G
17 POWDER, FOR SOLUTION ORAL DAILY
Status: DISCONTINUED | OUTPATIENT
Start: 2025-02-04 | End: 2025-02-06 | Stop reason: HOSPADM

## 2025-02-03 RX ORDER — OXYCODONE HYDROCHLORIDE 5 MG/1
10 TABLET ORAL EVERY 4 HOURS PRN
Status: DISCONTINUED | OUTPATIENT
Start: 2025-02-03 | End: 2025-02-06 | Stop reason: HOSPADM

## 2025-02-03 RX ORDER — HYDROMORPHONE HCL IN WATER/PF 6 MG/30 ML
0.4 PATIENT CONTROLLED ANALGESIA SYRINGE INTRAVENOUS EVERY 5 MIN PRN
Status: DISCONTINUED | OUTPATIENT
Start: 2025-02-03 | End: 2025-02-03 | Stop reason: HOSPADM

## 2025-02-03 RX ORDER — CEFAZOLIN SODIUM/WATER 2 G/20 ML
SYRINGE (ML) INTRAVENOUS
Status: DISCONTINUED
Start: 2025-02-03 | End: 2025-02-03 | Stop reason: HOSPADM

## 2025-02-03 RX ORDER — CEFAZOLIN SODIUM/WATER 2 G/20 ML
2 SYRINGE (ML) INTRAVENOUS SEE ADMIN INSTRUCTIONS
Status: DISCONTINUED | OUTPATIENT
Start: 2025-02-03 | End: 2025-02-03 | Stop reason: HOSPADM

## 2025-02-03 RX ORDER — NALOXONE HYDROCHLORIDE 0.4 MG/ML
0.4 INJECTION, SOLUTION INTRAMUSCULAR; INTRAVENOUS; SUBCUTANEOUS
Status: DISCONTINUED | OUTPATIENT
Start: 2025-02-03 | End: 2025-02-03

## 2025-02-03 RX ORDER — BISACODYL 10 MG
10 SUPPOSITORY, RECTAL RECTAL DAILY PRN
Status: DISCONTINUED | OUTPATIENT
Start: 2025-02-06 | End: 2025-02-06 | Stop reason: HOSPADM

## 2025-02-03 RX ORDER — LIDOCAINE 40 MG/G
CREAM TOPICAL
Status: DISCONTINUED | OUTPATIENT
Start: 2025-02-03 | End: 2025-02-03 | Stop reason: HOSPADM

## 2025-02-03 RX ORDER — ONDANSETRON 2 MG/ML
4 INJECTION INTRAMUSCULAR; INTRAVENOUS ONCE
Status: COMPLETED | OUTPATIENT
Start: 2025-02-03 | End: 2025-02-03

## 2025-02-03 RX ORDER — ONDANSETRON 2 MG/ML
4 INJECTION INTRAMUSCULAR; INTRAVENOUS EVERY 30 MIN PRN
Status: DISCONTINUED | OUTPATIENT
Start: 2025-02-03 | End: 2025-02-03 | Stop reason: HOSPADM

## 2025-02-03 RX ORDER — HYDROMORPHONE HYDROCHLORIDE 2 MG/1
2 TABLET ORAL EVERY 4 HOURS PRN
Status: DISCONTINUED | OUTPATIENT
Start: 2025-02-03 | End: 2025-02-03

## 2025-02-03 RX ORDER — KETOROLAC TROMETHAMINE 15 MG/ML
15 INJECTION, SOLUTION INTRAMUSCULAR; INTRAVENOUS ONCE
Status: COMPLETED | OUTPATIENT
Start: 2025-02-03 | End: 2025-02-03

## 2025-02-03 RX ORDER — HYDROMORPHONE HCL IN WATER/PF 6 MG/30 ML
0.2 PATIENT CONTROLLED ANALGESIA SYRINGE INTRAVENOUS
Status: DISCONTINUED | OUTPATIENT
Start: 2025-02-03 | End: 2025-02-03

## 2025-02-03 RX ORDER — DEXAMETHASONE SODIUM PHOSPHATE 10 MG/ML
4 INJECTION, SOLUTION INTRAMUSCULAR; INTRAVENOUS
Status: COMPLETED | OUTPATIENT
Start: 2025-02-03 | End: 2025-02-03

## 2025-02-03 RX ORDER — PROCHLORPERAZINE MALEATE 10 MG
10 TABLET ORAL EVERY 6 HOURS PRN
Status: DISCONTINUED | OUTPATIENT
Start: 2025-02-03 | End: 2025-02-06 | Stop reason: HOSPADM

## 2025-02-03 RX ORDER — ONDANSETRON 2 MG/ML
4 INJECTION INTRAMUSCULAR; INTRAVENOUS EVERY 6 HOURS PRN
Status: DISCONTINUED | OUTPATIENT
Start: 2025-02-03 | End: 2025-02-06 | Stop reason: HOSPADM

## 2025-02-03 RX ORDER — IOPAMIDOL 755 MG/ML
90 INJECTION, SOLUTION INTRAVASCULAR ONCE
Status: COMPLETED | OUTPATIENT
Start: 2025-02-03 | End: 2025-02-03

## 2025-02-03 RX ORDER — PROCHLORPERAZINE MALEATE 10 MG
10 TABLET ORAL EVERY 6 HOURS PRN
Status: DISCONTINUED | OUTPATIENT
Start: 2025-02-03 | End: 2025-02-03

## 2025-02-03 RX ORDER — ONDANSETRON 4 MG/1
4 TABLET, ORALLY DISINTEGRATING ORAL EVERY 6 HOURS PRN
Status: DISCONTINUED | OUTPATIENT
Start: 2025-02-03 | End: 2025-02-03

## 2025-02-03 RX ORDER — ONDANSETRON 2 MG/ML
4 INJECTION INTRAMUSCULAR; INTRAVENOUS EVERY 6 HOURS PRN
Status: DISCONTINUED | OUTPATIENT
Start: 2025-02-03 | End: 2025-02-03

## 2025-02-03 RX ORDER — AMOXICILLIN 250 MG
1 CAPSULE ORAL 2 TIMES DAILY
Status: DISCONTINUED | OUTPATIENT
Start: 2025-02-03 | End: 2025-02-06 | Stop reason: HOSPADM

## 2025-02-03 RX ORDER — ONDANSETRON 4 MG/1
4 TABLET, ORALLY DISINTEGRATING ORAL EVERY 6 HOURS PRN
Status: DISCONTINUED | OUTPATIENT
Start: 2025-02-03 | End: 2025-02-06 | Stop reason: HOSPADM

## 2025-02-03 RX ORDER — HYDROMORPHONE HCL IN WATER/PF 6 MG/30 ML
0.2 PATIENT CONTROLLED ANALGESIA SYRINGE INTRAVENOUS
Status: DISCONTINUED | OUTPATIENT
Start: 2025-02-03 | End: 2025-02-06 | Stop reason: HOSPADM

## 2025-02-03 RX ORDER — EPHEDRINE SULFATE 50 MG/ML
INJECTION, SOLUTION INTRAMUSCULAR; INTRAVENOUS; SUBCUTANEOUS PRN
Status: DISCONTINUED | OUTPATIENT
Start: 2025-02-03 | End: 2025-02-03

## 2025-02-03 RX ORDER — CEFAZOLIN SODIUM/WATER 2 G/20 ML
2 SYRINGE (ML) INTRAVENOUS
Status: COMPLETED | OUTPATIENT
Start: 2025-02-03 | End: 2025-02-03

## 2025-02-03 RX ORDER — HEPARIN SODIUM 5000 [USP'U]/.5ML
5000 INJECTION, SOLUTION INTRAVENOUS; SUBCUTANEOUS EVERY 8 HOURS
Status: DISCONTINUED | OUTPATIENT
Start: 2025-02-04 | End: 2025-02-06 | Stop reason: HOSPADM

## 2025-02-03 RX ADMIN — Medication 2 G: at 20:26

## 2025-02-03 RX ADMIN — ACETAMINOPHEN 975 MG: 325 TABLET ORAL at 23:16

## 2025-02-03 RX ADMIN — IOPAMIDOL 90 ML: 755 INJECTION, SOLUTION INTRAVENOUS at 15:59

## 2025-02-03 RX ADMIN — ROCURONIUM BROMIDE 25 MG: 10 INJECTION, SOLUTION INTRAVENOUS at 20:37

## 2025-02-03 RX ADMIN — ROCURONIUM BROMIDE 10 MG: 10 INJECTION, SOLUTION INTRAVENOUS at 21:47

## 2025-02-03 RX ADMIN — PROPOFOL 120 MG: 10 INJECTION, EMULSION INTRAVENOUS at 20:37

## 2025-02-03 RX ADMIN — Medication 5 MG: at 20:48

## 2025-02-03 RX ADMIN — DEXAMETHASONE SODIUM PHOSPHATE 4 MG: 10 INJECTION, SOLUTION INTRAMUSCULAR; INTRAVENOUS at 20:37

## 2025-02-03 RX ADMIN — SENNOSIDES AND DOCUSATE SODIUM 1 TABLET: 50; 8.6 TABLET ORAL at 23:16

## 2025-02-03 RX ADMIN — ONDANSETRON 4 MG: 2 INJECTION INTRAMUSCULAR; INTRAVENOUS at 20:37

## 2025-02-03 RX ADMIN — PHENYLEPHRINE HYDROCHLORIDE 200 MCG: 10 INJECTION INTRAVENOUS at 20:46

## 2025-02-03 RX ADMIN — ONDANSETRON 4 MG: 2 INJECTION, SOLUTION INTRAMUSCULAR; INTRAVENOUS at 15:48

## 2025-02-03 RX ADMIN — PIPERACILLIN AND TAZOBACTAM 3.38 G: 3; .375 INJECTION, POWDER, FOR SOLUTION INTRAVENOUS at 17:11

## 2025-02-03 RX ADMIN — DEXMEDETOMIDINE HYDROCHLORIDE 12 MCG: 100 INJECTION, SOLUTION INTRAVENOUS at 20:40

## 2025-02-03 RX ADMIN — KETOROLAC TROMETHAMINE 15 MG: 30 INJECTION, SOLUTION INTRAMUSCULAR at 21:50

## 2025-02-03 RX ADMIN — HYDROMORPHONE HYDROCHLORIDE 0.5 MG: 1 INJECTION, SOLUTION INTRAMUSCULAR; INTRAVENOUS; SUBCUTANEOUS at 21:59

## 2025-02-03 RX ADMIN — Medication 5 MG: at 20:50

## 2025-02-03 RX ADMIN — MIDAZOLAM 1 MG: 1 INJECTION INTRAMUSCULAR; INTRAVENOUS at 20:26

## 2025-02-03 RX ADMIN — SODIUM CHLORIDE: 9 INJECTION, SOLUTION INTRAVENOUS at 23:13

## 2025-02-03 RX ADMIN — PROPOFOL 50 MCG/KG/MIN: 10 INJECTION, EMULSION INTRAVENOUS at 20:44

## 2025-02-03 RX ADMIN — HYDROMORPHONE HYDROCHLORIDE 0.5 MG: 1 INJECTION, SOLUTION INTRAMUSCULAR; INTRAVENOUS; SUBCUTANEOUS at 18:22

## 2025-02-03 RX ADMIN — Medication 200 MG: at 21:52

## 2025-02-03 RX ADMIN — LIDOCAINE HYDROCHLORIDE 5 ML: 10 INJECTION, SOLUTION INFILTRATION; PERINEURAL at 20:37

## 2025-02-03 RX ADMIN — ROCURONIUM BROMIDE 10 MG: 10 INJECTION, SOLUTION INTRAVENOUS at 21:08

## 2025-02-03 RX ADMIN — FENTANYL CITRATE 100 MCG: 50 INJECTION INTRAMUSCULAR; INTRAVENOUS at 20:37

## 2025-02-03 RX ADMIN — KETOROLAC TROMETHAMINE 15 MG: 15 INJECTION, SOLUTION INTRAMUSCULAR; INTRAVENOUS at 15:48

## 2025-02-03 RX ADMIN — SODIUM CHLORIDE, POTASSIUM CHLORIDE, SODIUM LACTATE AND CALCIUM CHLORIDE: 600; 310; 30; 20 INJECTION, SOLUTION INTRAVENOUS at 22:01

## 2025-02-03 RX ADMIN — MIDAZOLAM 1 MG: 1 INJECTION INTRAMUSCULAR; INTRAVENOUS at 20:32

## 2025-02-03 RX ADMIN — SODIUM CHLORIDE, POTASSIUM CHLORIDE, SODIUM LACTATE AND CALCIUM CHLORIDE: 600; 310; 30; 20 INJECTION, SOLUTION INTRAVENOUS at 20:26

## 2025-02-03 ASSESSMENT — COLUMBIA-SUICIDE SEVERITY RATING SCALE - C-SSRS
2. HAVE YOU ACTUALLY HAD ANY THOUGHTS OF KILLING YOURSELF IN THE PAST MONTH?: NO
1. IN THE PAST MONTH, HAVE YOU WISHED YOU WERE DEAD OR WISHED YOU COULD GO TO SLEEP AND NOT WAKE UP?: NO
6. HAVE YOU EVER DONE ANYTHING, STARTED TO DO ANYTHING, OR PREPARED TO DO ANYTHING TO END YOUR LIFE?: NO

## 2025-02-03 ASSESSMENT — ACTIVITIES OF DAILY LIVING (ADL)
ADLS_ACUITY_SCORE: 41

## 2025-02-03 NOTE — H&P
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  2/3/2025 presented with 2-day history of right lower quadrant pain.  CT scan showed a perforated appendicitis with air and fluid levels.  No evidence of abscess.  Admitted.      Perforated appendicitis  Inpatient [facility] admission.  Expect length of stay greater than 2 nights.  Discussed pathophysiology of perforated appendicitis with the patient.  General surgery has been consulted and planning to take her to the OR.  Continue intravenous Zosyn.  Dilaudid and Tylenol as needed.  Keep NPO.  IV fluids.  Low surgical risk.    Leukocytosis  History of thalassemia  Initial white count 22.  Follow daily CBC.    History of Hurthle cell adenoma  History of thyroid lobectomy  Check TSH.    Vaginal atrophy  Hold on her estrogen cream at this time.  Hold patient's phentermine (she has been taking this for weight loss)    History of nephrolithiasis  Noted.    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  2/3/2025 presented with 2-day history of right lower quadrant  pain.  CT scan showed a perforated appendicitis with air and fluid levels.  No evidence of abscess.  Patient could tell when it likely perforated as she had an episode where the pain fell.  She is having significant chills.  Denies any shortness of breath.  Was having some nausea but now under good control.  Historical information reviewed.  As far she knows her thyroid has been well-balanced..  This is THC intermittent Gummies very rare.  Is never abused IV drugs.  Denies personal history of heart attack, stroke, cancer, diabetes, DVT, PE, peptic ulcer disease or sleep apnea.  Questions answered to verbalize satisfaction.    Past Medical History     Past Medical History:  [id number]: Arthritis  No date: Kidney stone  No date: Osteoporosis  No date: Thalassemia  1/1/20000: Thyroid disease      Comment:  Reslolved, thyroid lobectomy in 2000     Patient Active Problem List    Diagnosis Date Noted    Perforated appendicitis 02/03/2025     Priority: Medium    Acute appendicitis with perforation and generalized peritonitis, without abscess or gangrene 02/03/2025     Priority: Medium    Vaginal atrophy 03/29/2024     Priority: Medium     Intravaginal estradiol started 11/15/2023   3/29/24 - complete cessation of symptoms - now instructed to reduce from 2 times/week to taking 1/week.   10/2024 follow up       Idiopathic osteoporosis 06/08/2015     Priority: Medium     DEXA 5/2023 and 2020 - osteoperosis INTERVAL CHANGE  -There has been a 4.2% decrease in lumbar spine BMD.  -There has been a 1.6% increase in bilateral hip BMD.  On bisphosphonates since 2018?   Holding for 1 year - starting October of 2023  -rechecking DEXA scan May 2025       Calculus of kidney 06/08/2015     Priority: Medium     Calcium oxalate       Thalassemia minor 1965     Priority: Medium     Multivitamin   Mild fatigue - not often.        Surgical History     Past Surgical History:   Procedure Laterality Date    COLONOSCOPY  2013    COLONOSCOPY  N/A 1/31/2024    Procedure: COLONOSCOPY, WITH POLYPECTOMY AND BIOPSY;  Surgeon: Daniel Hu DO;  Location: [facility]    ENDOMETRIAL ABLATION      HEAD & NECK SURGERY  2000    Thyroid Lobectomy (Huerthle cell adenoma)    THYROID SURGERY  01/01/2000    lobectomy    TUBAL LIGATION      URETEROSCOPY       Family History      Family History   Problem Relation Age of Onset    Other - See Comments Mother         unknown bio mothers's History    Cancer Father         skin    Heart Disease Father     Coronary Artery Disease Father     Substance Abuse Father         Alcoholic, in recovery for long time    Osteoporosis Father     Chronic Obstructive Pulmonary Disease Father         SMOKING    Osteoporosis Paternal Grandmother     Cancer Paternal Grandfather         lung - Smoking    Breast Cancer Paternal Aunt 70    Urolithiasis No family hx of     Clotting Disorder No family hx of     Diabetes No family hx of     Gout No family hx of       Social History      Social History     Tobacco Use    Smoking status: Former     Types: Cigarettes     Passive exposure: Never    Smokeless tobacco: Never    Tobacco comments:     Started smoking at age 10 - quit smoking 30 years ago. quit NRT - in Jan.    Vaping Use    Vaping status: Never Used   Substance Use Topics    Alcohol use: Yes     Comment: Occasional    Drug use: Not Currently     Types: Marijuana     Comment: Edibles very rarely      Allergies     Allergies   Allergen Reactions    Escitalopram Unknown     Other Reaction(s): Weight gain/tired    Penicillin V      Other Reaction(s): Itching    Penicillins Unknown     Prior to Admission Medications      Prior to Admission Medications   Prescriptions Last Dose Informant Patient Reported? Taking?   Calcium Carb-Magnesium Carb 250-300 MG TABS 2/2/2025  Yes Yes   Sig: Take 1 tablet by mouth 2 times daily.   Vitamin D3 (CHOLECALCIFEROL) 125 MCG (5000 UT) tablet 2/2/2025 Morning  Yes Yes   Sig: Take 1 tablet by mouth daily.    estradiol (ESTRACE) 0.1 MG/GM vaginal cream 1/30/2025  No Yes   Sig: Place 1 g vaginally three times a week.   multivitamin (ONE A DAY) per tablet 2/2/2025 Morning  Yes Yes   Sig: [MULTIVITAMIN (ONE A DAY) PER TABLET] Take 1 tablet by mouth daily.   phentermine (ADIPEX-P) 37.5 MG tablet Past Month  No Yes   Sig: Take 0.5-1 tablets (18.75-37.5 mg) by mouth every morning (before breakfast).   psyllium (METAMUCIL/KONSYL) capsule 2/2/2025 Bedtime  Yes Yes   Sig: Take 2-3 capsules by mouth at bedtime.      Facility-Administered Medications: None      Review of Systems     A 12 point comprehensive review of systems was negative except as noted above in HPI.    PHYSICAL EXAMINATION     Vitals      Temp:  [98.7  F (37.1  C)] 98.7  F (37.1  C)  Pulse:  [79-91] 79  Resp:  [16] 16  BP: (104-126)/(55-66) 104/60  SpO2:  [92 %-99 %] 92 %    Examination     Constitutional: awake, alert, cooperative, no apparent distress, and appears stated age  Eyes: Lids and lashes normal, pupils equal, round and reactive to light, extra ocular muscles intact, sclera clear, conjunctiva normal  ENT: Normocephalic, without obvious abnormality, atraumatic, sinuses nontender on palpation, external ears without lesions, oral pharynx with moist mucous membranes, tonsils without erythema or exudates, gums normal and good dentition.  Hematologic / Lymphatic: no cervical lymphadenopathy and no supraclavicular lymphadenopathy  Respiratory: No increased work of breathing, good air exchange, clear to auscultation bilaterally, no crackles or wheezing  Cardiovascular: Normal apical impulse, regular rate and rhythm, normal S1 and S2, no S3 or S4, and no murmur noted  GI: Diminished bowel sounds significant diffuse tenderness.  No distention.  Skin: normal skin color, texture, turgor, no redness, warmth, or swelling, and no rashes  Musculoskeletal: There is no redness, warmth, or swelling of the joints.  Full range of motion noted.  Motor strength is 5 out of  5 all extremities bilaterally.  Tone is normal. no lower extremity pitting edema present  Neurologic: Cranial nerves II-XII are grossly intact. Sensory:  Sensory intact Deep Tendon Reflexes:  Reflexes are intact and symmetrical bilaterally  Neuropsychiatric: General: normal, calm, and normal eye contact Level of consciousness: alert / normal Affect: normal Orientation: oriented to self, place, time and situation Memory and insight: normal, memory for past and recent events intact, and thought process normal      Pertinent Lab     Results for orders placed or performed during the hospital encounter of 02/03/25 (from the past 24 hours)   UA with Microscopic reflex to Culture    Specimen: Urine, Clean Catch   Result Value Ref Range    Color Urine Yellow Colorless, Straw, Light Yellow, Yellow    Appearance Urine Clear Clear    Glucose Urine Negative Negative mg/dL    Bilirubin Urine Negative Negative    Ketones Urine 20 (A) Negative mg/dL    Specific Gravity Urine 1.028 1.001 - 1.030    Blood Urine 0.2 mg/dL (A) Negative    pH Urine 6.0 5.0 - 7.0    Protein Albumin Urine 30 (A) Negative mg/dL    Urobilinogen Urine <2.0 <2.0 mg/dL    Nitrite Urine Negative Negative    Leukocyte Esterase Urine Negative Negative    Mucus Urine Present (A) None Seen /LPF    RBC Urine 5 (H) <=2 /HPF    WBC Urine 1 <=5 /HPF    Squamous Epithelials Urine 1 <=1 /HPF    Narrative    Urine Culture not indicated   CBC (+ platelets, no diff)   Result Value Ref Range    WBC Count 22.6 (H) 4.0 - 11.0 10e3/uL    RBC Count 5.86 (H) 3.80 - 5.20 10e6/uL    Hemoglobin 12.3 11.7 - 15.7 g/dL    Hematocrit 37.6 35.0 - 47.0 %    MCV 64 (L) 78 - 100 fL    MCH 21.0 (L) 26.5 - 33.0 pg    MCHC 32.7 31.5 - 36.5 g/dL    RDW 16.8 (H) 10.0 - 15.0 %    Platelet Count 207 150 - 450 10e3/uL   Basic metabolic panel   Result Value Ref Range    Sodium 139 135 - 145 mmol/L    Potassium 4.1 3.4 - 5.3 mmol/L    Chloride 102 98 - 107 mmol/L    Carbon Dioxide (CO2) 25 22 - 29  mmol/L    Anion Gap 12 7 - 15 mmol/L    Urea Nitrogen 20.4 8.0 - 23.0 mg/dL    Creatinine 0.75 0.51 - 0.95 mg/dL    GFR Estimate 90 >60 mL/min/1.73m2    Calcium 10.1 8.8 - 10.4 mg/dL    Glucose 152 (H) 70 - 99 mg/dL   Lipase   Result Value Ref Range    Lipase 11 (L) 13 - 60 U/L   Magnesium   Result Value Ref Range    Magnesium 1.9 1.7 - 2.3 mg/dL   Hepatic panel   Result Value Ref Range    Protein Total 7.9 6.4 - 8.3 g/dL    Albumin 4.6 3.5 - 5.2 g/dL    Bilirubin Total 0.7 <=1.2 mg/dL    Alkaline Phosphatase 90 40 - 150 U/L    AST 23 0 - 45 U/L    ALT 21 0 - 50 U/L    Bilirubin Direct <0.20 0.00 - 0.30 mg/dL   RBC and Platelet Morphology   Result Value Ref Range    RBC Morphology Confirmed RBC Indices     Platelet Assessment  Automated Count Confirmed. Platelet morphology is normal.     Automated Count Confirmed. Platelet morphology is normal.   CT Abdomen Pelvis w Contrast   Result Value Ref Range    Radiologist flags Perforated appendicitis (AA)     Narrative    EXAM: CT ABDOMEN PELVIS W CONTRAST  LOCATION: [facility]  DATE: 2/3/2025    INDICATION: pain RLQ, appendicitis?  COMPARISON: CT abdomen pelvis without contrast 6/8/2015  TECHNIQUE: CT scan of the abdomen and pelvis was performed following injection of IV contrast. Multiplanar reformats were obtained. Dose reduction techniques were used.  CONTRAST: 90ml Isovue 370    FINDINGS:   LOWER CHEST: Normal.    HEPATOBILIARY: Diffuse hepatic steatosis. No significant mass. No bile duct dilatation. No calcified gallstones.    PANCREAS: Normal.    SPLEEN: Normal.    ADRENAL GLANDS: Normal.    KIDNEYS/BLADDER: No significant mass, stones, or hydronephrosis. There are simple or benign cysts. No follow up is needed.    BOWEL: The appendix is enlarged and has mucosal hyperenhancement. There is an appendicolith at the base of the appendix. Periappendiceal inflammatory stranding is present which extends to the base of the cecum. Tiny foci of free  air are present in the   right lower quadrant (series 3, images 119-122). Free fluid layers into the pelvis. There is peritoneal thickening anterior and posterior in the right lower quadrant. No focal intra-abdominal fluid collection. No dilated large or small bowel.    LYMPH NODES: Mildly enlarged, reactive lymph nodes are present in the mesentery to the right lower quadrant.    VASCULATURE: Patchy, mild to moderate atheromatous calcifications are present in the abdominal aorta and iliac arteries. No aneurysm or critical stenosis.    PELVIC ORGANS: Normal.    MUSCULOSKELETAL: Low thoracic and lumbar vertebra are maintained in height and shape.      Impression    IMPRESSION:     Acute appendicitis complicated by appendix perforation with small foci of free air, peritoneal thickening and layering fluid in the pelvis. No organized intra-abdominal fluid collection to suggest abscess.      [Critical Result: Perforated appendicitis]    Finding was identified on 2/3/2025 4:19 PM CST.     Merline LIND in [facility] ER was contacted by me on 2/3/2025 4:25 PM CST and verbalized understanding of the critical result.       Pertinent Radiology     Radiology Results:   Recent Results (from the past 24 hours)   CT Abdomen Pelvis w Contrast   Result Value    Radiologist flags Perforated appendicitis (AA)    Narrative    EXAM: CT ABDOMEN PELVIS W CONTRAST  LOCATION: [facility]  DATE: 2/3/2025    INDICATION: pain RLQ, appendicitis?  COMPARISON: CT abdomen pelvis without contrast 6/8/2015  TECHNIQUE: CT scan of the abdomen and pelvis was performed following injection of IV contrast. Multiplanar reformats were obtained. Dose reduction techniques were used.  CONTRAST: 90ml Isovue 370    FINDINGS:   LOWER CHEST: Normal.    HEPATOBILIARY: Diffuse hepatic steatosis. No significant mass. No bile duct dilatation. No calcified gallstones.    PANCREAS: Normal.    SPLEEN: Normal.    ADRENAL GLANDS:  Normal.    KIDNEYS/BLADDER: No significant mass, stones, or hydronephrosis. There are simple or benign cysts. No follow up is needed.    BOWEL: The appendix is enlarged and has mucosal hyperenhancement. There is an appendicolith at the base of the appendix. Periappendiceal inflammatory stranding is present which extends to the base of the cecum. Tiny foci of free air are present in the   right lower quadrant (series 3, images 119-122). Free fluid layers into the pelvis. There is peritoneal thickening anterior and posterior in the right lower quadrant. No focal intra-abdominal fluid collection. No dilated large or small bowel.    LYMPH NODES: Mildly enlarged, reactive lymph nodes are present in the mesentery to the right lower quadrant.    VASCULATURE: Patchy, mild to moderate atheromatous calcifications are present in the abdominal aorta and iliac arteries. No aneurysm or critical stenosis.    PELVIC ORGANS: Normal.    MUSCULOSKELETAL: Low thoracic and lumbar vertebra are maintained in height and shape.      Impression    IMPRESSION:     Acute appendicitis complicated by appendix perforation with small foci of free air, peritoneal thickening and layering fluid in the pelvis. No organized intra-abdominal fluid collection to suggest abscess.      [Critical Result: Perforated appendicitis]    Finding was identified on 2/3/2025 4:19 PM CST.     Merline LIND in [facility] was contacted by me on 2/3/2025 4:25 PM CST and verbalized understanding of the critical result.               Gurvinder Fields MD  [facility]  [facility]   Phone: #[phone]

## 2025-02-03 NOTE — ED PROVIDER NOTES
Emergency Department Encounter   NAME: Emilia Mccormick ; AGE: 59 year old female ; YOB: 1965 ; MRN: [id number] ; PCP: Shantelle Ornelas   ED PROVIDER: Merline Troncoso PA-C    Evaluation Date & Time:   No admission date for patient encounter.    CHIEF COMPLAINT:  Abdominal Pain and Dysuria      Impression and Plan   MDM: Emilia Mccormick is a 59 year old female who presents to the ED for evaluation of right lower quadrant abdominal pain that began 2 days ago.  She appears well on initial exam but does appear uncomfortable.  She has significant tenderness with guarding to the right lower quadrant and referred pain to the right lower quadrant when palpating elsewhere in the abdomen.  Patient given Toradol and Zofran for pain and nausea.  Differential diagnosis includes appendicitis, ovarian etiology of pain such as torsion versus ovarian cyst, kidney stone, UTI, pyelonephritis.  Low suspicion for ovarian etiology with patient's age and no vaginal or pelvic sympoms.   CBC is remarkable for leukocytosis of 22.6.  No anemia.  BMP notable for mildly elevated glucose 152.  Magnesium normal.  Lipase low at 11.  UA remarkable for some blood in the urine and protein but no signs of infection.  Liver function test normal.    CT abdomen pelvis ordered and is remarkable for a acute appendicitis with appendix perforation and free air with peritoneal thickening and fluid in the pelvis.  No signs of abscess.  There are mild reactive lymph nodes as well.  Radiologist called me to inform me of results at 4:25 PM.    I discussed case with general surgeon Dr. Ospina who advises starting Zosyn and will talk with patient regarding surgical versus nonsurgical management.  Following this patient is talking on the phone with Dr. Ospina if she works with him and she would like to proceed with operative management.  Plan will be to take her to the OR [facility].    I admitted patient to the hospitalist for general surgeons  request in the meantime. Dr. Fields accepts patient for admission.     Patient updated and in agreement with plan.       Medical Decision Making  Obtained supplemental history:Supplemental history obtained?: No  Reviewed external records: External records reviewed?: Documented in chart  Care impacted by chronic illness:Documented in Chart  Did you consider but not order tests?: Work up considered but not performed and documented in chart, if applicable  Did you interpret images independently?: Independent interpretation of ECG and images noted in documentation, when applicable.  Consultation discussion with other provider:Did you involve another provider (consultant, , pharmacy, etc.)?: I discussed the care with another health care provider, see documentation for details.  Admit.    MIPS: Not Applicable      Critical Care: 0    ED COURSE:  3:13 PM I met and introduced myself to the patient. I gathered initial history and performed my physical exam. We discussed plan for initial workup.  I have staffed the patient with Dr. Sharif, ED MD, who has evaluated the patient and agrees with all aspects of today's care.  I rechecked the patient and discussed results, discharge, follow up, and reasons to return to the ED.     At the conclusion of the encounter I discussed the results of all the tests and the disposition. The questions were answered. The patient or family acknowledged understanding and was agreeable with the care plan.    FINAL IMPRESSION:    ICD-10-CM    1. Acute appendicitis with perforation and generalized peritonitis, without abscess or gangrene  K35.201 Case Request: APPENDECTOMY, LAPAROSCOPIC     Case Request: APPENDECTOMY, LAPAROSCOPIC      2. Perforated appendicitis  K35.32             MEDICATIONS GIVEN IN THE EMERGENCY DEPARTMENT:  Medications   ceFAZolin Sodium (ANCEF) 2 G/20ML injection (has no administration in time range)   ketorolac (TORADOL) injection 15 mg (15 mg Intravenous $Given  2/3/25 1548)   ondansetron (ZOFRAN) injection 4 mg (4 mg Intravenous $Given 2/3/25 1548)   iopamidol (ISOVUE-370) solution 90 mL (90 mLs Intravenous $Given 2/3/25 1559)   piperacillin-tazobactam (ZOSYN) 3.375 g vial to attach to  mL bag (3.375 g Intravenous $New Bag 2/3/25 1711)         NEW PRESCRIPTIONS STARTED AT TODAY'S ED VISIT:  New Prescriptions    No medications on file         HPI   Use of Intrepreter: N/A     Emiliastoney Mccormick is a 59 year old female with a pertinent history of kidney stones, osteoporosis who presents to the ED by self for evaluation of right lower quadrant pain that started 2 days ago.  Reports that has overall been worsening over the last few days.  No nausea or vomiting.  No diarrhea.  Has had increased urination and reports but no blood in the urine or pain with urination.  No associated back pain.  Last bowel movement was yesterday and has not passed gas today.     Has had a history of kidney stones but reports this feels different.  No back pain.    Medical History     Past Medical History:   Diagnosis Date    Arthritis [id number]    Kidney stone     Osteoporosis     Thalassemia     Thyroid disease 1/1/20000       Past Surgical History:   Procedure Laterality Date    COLONOSCOPY  2013    COLONOSCOPY N/A 1/31/2024    Procedure: COLONOSCOPY, WITH POLYPECTOMY AND BIOPSY;  Surgeon: Daniel Hu DO;  Location: [facility]    ENDOMETRIAL ABLATION      HEAD & NECK SURGERY  2000    Thyroid Lobectomy (Huerthle cell adenoma)    THYROID SURGERY  01/01/2000    lobectomy    TUBAL LIGATION      URETEROSCOPY         Family History   Problem Relation Age of Onset    Other - See Comments Mother         unknown bio mothers's History    Cancer Father         skin    Heart Disease Father     Coronary Artery Disease Father     Substance Abuse Father         Alcoholic, in recovery for long time    Osteoporosis Father     Chronic Obstructive Pulmonary Disease Father         SMOKING     Osteoporosis Paternal Grandmother     Cancer Paternal Grandfather         lung - Smoking    Breast Cancer Paternal Aunt 70    Urolithiasis No family hx of     Clotting Disorder No family hx of     Diabetes No family hx of     Gout No family hx of        Social History     Tobacco Use    Smoking status: Former     Types: Cigarettes     Passive exposure: Never    Smokeless tobacco: Never    Tobacco comments:     Started smoking at age 10 - quit smoking 30 years ago. quit NRT - in Jan.    Vaping Use    Vaping status: Never Used   Substance Use Topics    Alcohol use: Yes     Comment: Occasional    Drug use: Not Currently     Types: Marijuana     Comment: Edibles very rarely       Calcium Carb-Magnesium Carb 250-300 MG TABS  estradiol (ESTRACE) 0.1 MG/GM vaginal cream  multivitamin (ONE A DAY) per tablet  phentermine (ADIPEX-P) 37.5 MG tablet  psyllium (METAMUCIL/KONSYL) capsule  Vitamin D3 (CHOLECALCIFEROL) 125 MCG (5000 UT) tablet          Physical Exam     First Vitals:  Patient Vitals for the past 24 hrs:   BP Temp Temp src Pulse Resp SpO2 Height Weight   02/03/25 1800 108/61 -- -- 82 -- 99 % -- --   02/03/25 1745 115/66 -- -- 86 -- 99 % -- --   02/03/25 1645 112/59 -- -- 91 -- 97 % -- --   02/03/25 1630 110/55 -- -- 91 -- 97 % -- --   02/03/25 1135 126/59 98.7  F (37.1  C) Oral 90 16 98 % 1.524 m (5') 54.4 kg (120 lb)         PHYSICAL EXAM:   Physical Exam    Constitutional: alert,  no acute distress,  appears in pain   Head: normocephalic, atraumatic  Eyes: EOM intact   Neck: ROM normal  Cardio: regular rate, regular rhythm, no murmurs   Pulmonary: effort normal, lung sounds normal, no wheezing, crackles, or rales    Abdominal: flat, no distention, significant tenderness over RLQ with rebound tenderness and referred pain to RLQ with palpation of other quadrants   MSK: no obvious swelling or deformity, no flank tenderness,   Skin: no visible rashes or erythema   Neuro: no gross focal deficit, acting per baseline    Psychiatric: mood and behavior within normal limit    Results     LAB:  All pertinent labs reviewed and interpreted  Labs Ordered and Resulted from Time of ED Arrival to Time of ED Departure   ROUTINE UA WITH MICROSCOPIC REFLEX TO CULTURE - Abnormal       Result Value    Color Urine Yellow      Appearance Urine Clear      Glucose Urine Negative      Bilirubin Urine Negative      Ketones Urine 20 (*)     Specific Gravity Urine 1.028      Blood Urine 0.2 mg/dL (*)     pH Urine 6.0      Protein Albumin Urine 30 (*)     Urobilinogen Urine <2.0      Nitrite Urine Negative      Leukocyte Esterase Urine Negative      Mucus Urine Present (*)     RBC Urine 5 (*)     WBC Urine 1      Squamous Epithelials Urine 1     CBC WITH PLATELETS - Abnormal    WBC Count 22.6 (*)     RBC Count 5.86 (*)     Hemoglobin 12.3      Hematocrit 37.6      MCV 64 (*)     MCH 21.0 (*)     MCHC 32.7      RDW 16.8 (*)     Platelet Count 207     BASIC METABOLIC PANEL - Abnormal    Sodium 139      Potassium 4.1      Chloride 102      Carbon Dioxide (CO2) 25      Anion Gap 12      Urea Nitrogen 20.4      Creatinine 0.75      GFR Estimate 90      Calcium 10.1      Glucose 152 (*)    LIPASE - Abnormal    Lipase 11 (*)    MAGNESIUM - Normal    Magnesium 1.9     HEPATIC FUNCTION PANEL - Normal    Protein Total 7.9      Albumin 4.6      Bilirubin Total 0.7      Alkaline Phosphatase 90      AST 23      ALT 21      Bilirubin Direct <0.20     RBC AND PLATELET MORPHOLOGY    RBC Morphology Confirmed RBC Indices      Platelet Assessment        Value: Automated Count Confirmed. Platelet morphology is normal.        RADIOLOGY:  CT Abdomen Pelvis w Contrast   Final Result   Abnormal   IMPRESSION:       Acute appendicitis complicated by appendix perforation with small foci of free air, peritoneal thickening and layering fluid in the pelvis. No organized intra-abdominal fluid collection to suggest abscess.         [Critical Result: Perforated appendicitis]       Finding was identified on 2/3/2025 4:19 PM CST.       Merline LIND in [facility] ER was contacted by me on 2/3/2025 4:25 PM CST and verbalized understanding of the critical result.        PROCEDURES:  None     Merline Troncoso PA-C   Emergency Medicine   [facility] EMERGENCY ROOM       Merline Troncoso PA-C  02/03/25 1806

## 2025-02-03 NOTE — PHARMACY-ADMISSION MEDICATION HISTORY
Pharmacist Admission Medication History    Admission medication history is complete. The information provided in this note is only as accurate as the sources available at the time of the update.    Information Source(s): Patient and CareEverywhere/SureScripts via in-person    Pertinent Information: N/A    Changes made to PTA medication list:  Added: psyllium  Deleted: fibercon  Changed: d3 dose    Allergies reviewed with patient and updates made in EHR: yes    Medication History Completed By: Raj Kumari RPH 2/3/2025 5:30 PM    PTA Med List   Medication Sig Last Dose/Taking    Calcium Carb-Magnesium Carb 250-300 MG TABS Take 1 tablet by mouth 2 times daily. 2/2/2025    estradiol (ESTRACE) 0.1 MG/GM vaginal cream Place 1 g vaginally three times a week. 1/30/2025    multivitamin (ONE A DAY) per tablet [MULTIVITAMIN (ONE A DAY) PER TABLET] Take 1 tablet by mouth daily. 2/2/2025 Morning    phentermine (ADIPEX-P) 37.5 MG tablet Take 0.5-1 tablets (18.75-37.5 mg) by mouth every morning (before breakfast). Past Month    psyllium (METAMUCIL/KONSYL) capsule Take 2-3 capsules by mouth at bedtime. 2/2/2025 Bedtime    Vitamin D3 (CHOLECALCIFEROL) 125 MCG (5000 UT) tablet Take 1 tablet by mouth daily. 2/2/2025 Morning

## 2025-02-03 NOTE — ED PROVIDER NOTES
Emergency Department Midlevel Supervisory Note     I had a face to face encounter with this patient seen by the Advanced Practice Provider (GIOVANI). I personally made/approved the management plan and take responsibility for the patient management. I personally saw patient and performed a substantive portion of the visit including all aspects of the medical decision making.     ED Course:  4:42 PM Merline Troncoso PA-C staffed patient with me. I agree with their assessment and plan of management, and I will see the patient.  4:54 PM I met with the patient to introduce myself, gather additional history, perform my initial exam, and discuss the plan.     Brief HPI:     Emilia Mccormick is a 59 year old female who presents for evaluation of abdominal pain and dysuria. Patient endorses RLQ abdominal pain since Saturday, as well as frequent urination. Patient denies nausea, vomiting, and diarrhea.     I, Daniel Calix, am serving as a scribe to document services personally performed by Leticia Sharif MD, based on my observations and the provider's statements to me.   I, Leticia Sharif MD attest that Daniel Calix was acting in a scribe capacity, has observed my performance of the services and has documented them in accordance with my direction.     Brief Physical Exam: /58   Pulse 84   Temp 98.6  F (37  C) (Temporal)   Resp 16   Ht 1.524 m (5')   Wt 54.4 kg (120 lb)   LMP  (LMP Unknown)   SpO2 97%   BMI 23.44 kg/m    Constitutional:  Alert, in no acute distress  EYES: Conjunctivae clear  HENT:  Atraumatic  Respiratory:  Respirations even, unlabored, in no acute respiratory distress  Cardiovascular:  Regular rate and rhythm, good peripheral perfusion  GI: Soft, non-distended, tender in the RLQ with rebound tenderness  Musculoskeletal:  Moves all 4 extremities equally, grossly symmetrical strength  Integument: Warm & dry. No appreciable rash, erythema.  Neurologic:  Alert & oriented, speech clear  and fluent, no focal deficits noted  Psych: Normal mood and affect       MDM:  Patient presents with abdominal pain that started 2 days ago.  She is tender in the right lower quadrant.  CT scan of the abdomen pelvis is consistent with a perforated appendicitis.  Patient was initiated on IV Zosyn, surgery was consulted.  Will plan to take her to the OR this evening.       1. Acute appendicitis with perforation and generalized peritonitis, without abscess or gangrene    2. Perforated appendicitis        Consults:  I discussed the patient with Dr. Ospina who recommends admission to the hospitalist, IV abx, and will discuss options with the patient.     Labs and Imaging:  Results for orders placed or performed during the hospital encounter of 02/03/25   CT Abdomen Pelvis w Contrast   Result Value Ref Range    Radiologist flags Perforated appendicitis (AA)     Impression    IMPRESSION:     Acute appendicitis complicated by appendix perforation with small foci of free air, peritoneal thickening and layering fluid in the pelvis. No organized intra-abdominal fluid collection to suggest abscess.      [Critical Result: Perforated appendicitis]    Finding was identified on 2/3/2025 4:19 PM CST.     Merline LIND in [facility] ER was contacted by me on 2/3/2025 4:25 PM CST and verbalized understanding of the critical result.   UA with Microscopic reflex to Culture    Specimen: Urine, Clean Catch   Result Value Ref Range    Color Urine Yellow Colorless, Straw, Light Yellow, Yellow    Appearance Urine Clear Clear    Glucose Urine Negative Negative mg/dL    Bilirubin Urine Negative Negative    Ketones Urine 20 (A) Negative mg/dL    Specific Gravity Urine 1.028 1.001 - 1.030    Blood Urine 0.2 mg/dL (A) Negative    pH Urine 6.0 5.0 - 7.0    Protein Albumin Urine 30 (A) Negative mg/dL    Urobilinogen Urine <2.0 <2.0 mg/dL    Nitrite Urine Negative Negative    Leukocyte Esterase Urine Negative Negative    Mucus Urine Present (A) None  Seen /LPF    RBC Urine 5 (H) <=2 /HPF    WBC Urine 1 <=5 /HPF    Squamous Epithelials Urine 1 <=1 /HPF   CBC (+ platelets, no diff)   Result Value Ref Range    WBC Count 22.6 (H) 4.0 - 11.0 10e3/uL    RBC Count 5.86 (H) 3.80 - 5.20 10e6/uL    Hemoglobin 12.3 11.7 - 15.7 g/dL    Hematocrit 37.6 35.0 - 47.0 %    MCV 64 (L) 78 - 100 fL    MCH 21.0 (L) 26.5 - 33.0 pg    MCHC 32.7 31.5 - 36.5 g/dL    RDW 16.8 (H) 10.0 - 15.0 %    Platelet Count 207 150 - 450 10e3/uL   Basic metabolic panel   Result Value Ref Range    Sodium 139 135 - 145 mmol/L    Potassium 4.1 3.4 - 5.3 mmol/L    Chloride 102 98 - 107 mmol/L    Carbon Dioxide (CO2) 25 22 - 29 mmol/L    Anion Gap 12 7 - 15 mmol/L    Urea Nitrogen 20.4 8.0 - 23.0 mg/dL    Creatinine 0.75 0.51 - 0.95 mg/dL    GFR Estimate 90 >60 mL/min/1.73m2    Calcium 10.1 8.8 - 10.4 mg/dL    Glucose 152 (H) 70 - 99 mg/dL   Result Value Ref Range    Lipase 11 (L) 13 - 60 U/L   Result Value Ref Range    Magnesium 1.9 1.7 - 2.3 mg/dL   Hepatic panel   Result Value Ref Range    Protein Total 7.9 6.4 - 8.3 g/dL    Albumin 4.6 3.5 - 5.2 g/dL    Bilirubin Total 0.7 <=1.2 mg/dL    Alkaline Phosphatase 90 40 - 150 U/L    AST 23 0 - 45 U/L    ALT 21 0 - 50 U/L    Bilirubin Direct <0.20 0.00 - 0.30 mg/dL   RBC and Platelet Morphology   Result Value Ref Range    RBC Morphology Confirmed RBC Indices     Platelet Assessment  Automated Count Confirmed. Platelet morphology is normal.     Automated Count Confirmed. Platelet morphology is normal.   TSH with free T4 reflex   Result Value Ref Range    TSH 0.68 0.30 - 4.20 uIU/mL       I have reviewed the relevant laboratory studies above.    I independently interpreted the following imaging study(s):   CT Abdomen Pelvis w Contrast    Result Date: 2/3/2025  EXAM: CT ABDOMEN PELVIS W CONTRAST [facility]ATION: [facility] DATE: 2/3/2025 INDICATION: pain RLQ, appendicitis? COMPARISON: CT abdomen pelvis without contrast 6/8/2015 TECHNIQUE: CT  scan of the abdomen and pelvis was performed following injection of IV contrast. Multiplanar reformats were obtained. Dose reduction techniques were used. CONTRAST: 90ml Isovue 370 FINDINGS: LOWER CHEST: Normal. HEPATOBILIARY: Diffuse hepatic steatosis. No significant mass. No bile duct dilatation. No calcified gallstones. PANCREAS: Normal. SPLEEN: Normal. ADRENAL GLANDS: Normal. KIDNEYS/BLADDER: No significant mass, stones, or hydronephrosis. There are simple or benign cysts. No follow up is needed. BOWEL: The appendix is enlarged and has mucosal hyperenhancement. There is an appendicolith at the base of the appendix. Periappendiceal inflammatory stranding is present which extends to the base of the cecum. Tiny foci of free air are present in the right lower quadrant (series 3, images 119-122). Free fluid layers into the pelvis. There is peritoneal thickening anterior and posterior in the right lower quadrant. No focal intra-abdominal fluid collection. No dilated large or small bowel. LYMPH NODES: Mildly enlarged, reactive lymph nodes are present in the mesentery to the right lower quadrant. VASCULATURE: Patchy, mild to moderate atheromatous calcifications are present in the abdominal aorta and iliac arteries. No aneurysm or critical stenosis. PELVIC ORGANS: Normal. MUSCULOSKELETAL: Low thoracic and lumbar vertebra are maintained in height and shape.     IMPRESSION: Acute appendicitis complicated by appendix perforation with small foci of free air, peritoneal thickening and layering fluid in the pelvis. No organized intra-abdominal fluid collection to suggest abscess. [Critical Result: Perforated appendicitis] Finding was identified on 2/3/2025 4:19 PM CST. Merline LIND in [facility] was contacted by me on 2/3/2025 4:25 PM CST and verbalized understanding of the critical result.       Procedures:  I was present for the key portions of procedures documented in GIOVANI/midlevel note, see midlevel note for further  details.    Leticia Sharif MD  [facility] EMERGENCY ROOM  [address] [address] [phone]       Leticia Sharif MD  02/03/25 2046

## 2025-02-03 NOTE — ED TRIAGE NOTES
Patient presents to ED with RLQ pain since Saturday, reports frequent urination, denies N/V/D.  Sherri Simons RN.......2/3/2025 11:37 AM     Triage Assessment (Adult)       Row Name 02/03/25 1136          Triage Assessment    Airway WDL WDL        Respiratory WDL    Respiratory WDL WDL        Skin Circulation/Temperature WDL    Skin Circulation/Temperature WDL WDL        Cardiac WDL    Cardiac WDL WDL        Peripheral/Neurovascular WDL    Peripheral Neurovascular WDL WDL        Cognitive/Neuro/Behavioral WDL    Cognitive/Neuro/Behavioral WDL WDL

## 2025-02-04 LAB
ALBUMIN SERPL BCG-MCNC: 3.4 G/DL (ref 3.5–5.2)
ALP SERPL-CCNC: 66 U/L (ref 40–150)
ALT SERPL W P-5'-P-CCNC: 18 U/L (ref 0–50)
ANION GAP SERPL CALCULATED.3IONS-SCNC: 8 MMOL/L (ref 7–15)
AST SERPL W P-5'-P-CCNC: 21 U/L (ref 0–45)
BASOPHILS # BLD AUTO: 0 10E3/UL (ref 0–0.2)
BASOPHILS NFR BLD AUTO: 0 %
BILIRUB SERPL-MCNC: 0.4 MG/DL
BUN SERPL-MCNC: 20 MG/DL (ref 8–23)
CALCIUM SERPL-MCNC: 8.1 MG/DL (ref 8.8–10.4)
CHLORIDE SERPL-SCNC: 106 MMOL/L (ref 98–107)
CREAT SERPL-MCNC: 0.69 MG/DL (ref 0.51–0.95)
EGFRCR SERPLBLD CKD-EPI 2021: >90 ML/MIN/1.73M2
EOSINOPHIL # BLD AUTO: 0.2 10E3/UL (ref 0–0.7)
EOSINOPHIL NFR BLD AUTO: 1 %
ERYTHROCYTE [DISTWIDTH] IN BLOOD BY AUTOMATED COUNT: 15.7 % (ref 10–15)
GLUCOSE SERPL-MCNC: 148 MG/DL (ref 70–99)
GLUCOSE SERPL-MCNC: 158 MG/DL (ref 70–99)
HCO3 SERPL-SCNC: 24 MMOL/L (ref 22–29)
HCT VFR BLD AUTO: 28.7 % (ref 35–47)
HGB BLD-MCNC: 8.9 G/DL (ref 11.7–15.7)
HGB BLD-MCNC: 9 G/DL (ref 11.7–15.7)
HGB BLD-MCNC: 9.2 G/DL (ref 11.7–15.7)
HGB BLD-MCNC: 9.4 G/DL (ref 11.7–15.7)
HOLD SPECIMEN: NORMAL
IMM GRANULOCYTES # BLD: 0.1 10E3/UL
IMM GRANULOCYTES NFR BLD: 1 %
LYMPHOCYTES # BLD AUTO: 0.9 10E3/UL (ref 0.8–5.3)
LYMPHOCYTES NFR BLD AUTO: 5 %
MAGNESIUM SERPL-MCNC: 1.8 MG/DL (ref 1.7–2.3)
MCH RBC QN AUTO: 21 PG (ref 26.5–33)
MCHC RBC AUTO-ENTMCNC: 32.8 G/DL (ref 31.5–36.5)
MCV RBC AUTO: 64 FL (ref 78–100)
MONOCYTES # BLD AUTO: 0.9 10E3/UL (ref 0–1.3)
MONOCYTES NFR BLD AUTO: 5 %
NEUTROPHILS # BLD AUTO: 15.4 10E3/UL (ref 1.6–8.3)
NEUTROPHILS NFR BLD AUTO: 88 %
NRBC # BLD AUTO: 0 10E3/UL
NRBC BLD AUTO-RTO: 0 /100
PHOSPHATE SERPL-MCNC: 2.8 MG/DL (ref 2.5–4.5)
PLAT MORPH BLD: NORMAL
PLATELET # BLD AUTO: 158 10E3/UL (ref 150–450)
POTASSIUM SERPL-SCNC: 3.9 MMOL/L (ref 3.4–5.3)
PROT SERPL-MCNC: 6 G/DL (ref 6.4–8.3)
RBC # BLD AUTO: 4.48 10E6/UL (ref 3.8–5.2)
RBC MORPH BLD: NORMAL
SODIUM SERPL-SCNC: 138 MMOL/L (ref 135–145)
WBC # BLD AUTO: 17.6 10E3/UL (ref 4–11)

## 2025-02-04 PROCEDURE — 36415 COLL VENOUS BLD VENIPUNCTURE: CPT | Performed by: INTERNAL MEDICINE

## 2025-02-04 PROCEDURE — 80069 RENAL FUNCTION PANEL: CPT | Performed by: SURGERY

## 2025-02-04 PROCEDURE — 250N000011 HC RX IP 250 OP 636: Performed by: SURGERY

## 2025-02-04 PROCEDURE — 85018 HEMOGLOBIN: CPT | Performed by: STUDENT IN AN ORGANIZED HEALTH CARE EDUCATION/TRAINING PROGRAM

## 2025-02-04 PROCEDURE — 82947 ASSAY GLUCOSE BLOOD QUANT: CPT | Performed by: INTERNAL MEDICINE

## 2025-02-04 PROCEDURE — 250N000013 HC RX MED GY IP 250 OP 250 PS 637: Performed by: SURGERY

## 2025-02-04 PROCEDURE — 258N000003 HC RX IP 258 OP 636: Performed by: INTERNAL MEDICINE

## 2025-02-04 PROCEDURE — 99233 SBSQ HOSP IP/OBS HIGH 50: CPT | Performed by: STUDENT IN AN ORGANIZED HEALTH CARE EDUCATION/TRAINING PROGRAM

## 2025-02-04 PROCEDURE — 250N000013 HC RX MED GY IP 250 OP 250 PS 637: Performed by: INTERNAL MEDICINE

## 2025-02-04 PROCEDURE — 83735 ASSAY OF MAGNESIUM: CPT | Performed by: INTERNAL MEDICINE

## 2025-02-04 PROCEDURE — 250N000011 HC RX IP 250 OP 636: Performed by: STUDENT IN AN ORGANIZED HEALTH CARE EDUCATION/TRAINING PROGRAM

## 2025-02-04 PROCEDURE — 85025 COMPLETE CBC W/AUTO DIFF WBC: CPT | Performed by: INTERNAL MEDICINE

## 2025-02-04 PROCEDURE — 36415 COLL VENOUS BLD VENIPUNCTURE: CPT | Performed by: SURGERY

## 2025-02-04 PROCEDURE — 120N000001 HC R&B MED SURG/OB

## 2025-02-04 PROCEDURE — 250N000013 HC RX MED GY IP 250 OP 250 PS 637: Performed by: STUDENT IN AN ORGANIZED HEALTH CARE EDUCATION/TRAINING PROGRAM

## 2025-02-04 PROCEDURE — 36415 COLL VENOUS BLD VENIPUNCTURE: CPT | Performed by: STUDENT IN AN ORGANIZED HEALTH CARE EDUCATION/TRAINING PROGRAM

## 2025-02-04 PROCEDURE — 82947 ASSAY GLUCOSE BLOOD QUANT: CPT | Performed by: SURGERY

## 2025-02-04 PROCEDURE — 84520 ASSAY OF UREA NITROGEN: CPT | Performed by: INTERNAL MEDICINE

## 2025-02-04 PROCEDURE — 258N000003 HC RX IP 258 OP 636: Performed by: STUDENT IN AN ORGANIZED HEALTH CARE EDUCATION/TRAINING PROGRAM

## 2025-02-04 PROCEDURE — 84100 ASSAY OF PHOSPHORUS: CPT | Performed by: SURGERY

## 2025-02-04 RX ORDER — PIPERACILLIN SODIUM, TAZOBACTAM SODIUM 3; .375 G/15ML; G/15ML
3.38 INJECTION, POWDER, LYOPHILIZED, FOR SOLUTION INTRAVENOUS ONCE
Status: COMPLETED | OUTPATIENT
Start: 2025-02-04 | End: 2025-02-04

## 2025-02-04 RX ORDER — PIPERACILLIN SODIUM, TAZOBACTAM SODIUM 3; .375 G/15ML; G/15ML
3.38 INJECTION, POWDER, LYOPHILIZED, FOR SOLUTION INTRAVENOUS EVERY 8 HOURS
Status: DISCONTINUED | OUTPATIENT
Start: 2025-02-04 | End: 2025-02-06 | Stop reason: HOSPADM

## 2025-02-04 RX ORDER — MAGNESIUM OXIDE 400 MG/1
400 TABLET ORAL EVERY 4 HOURS
Status: COMPLETED | OUTPATIENT
Start: 2025-02-04 | End: 2025-02-04

## 2025-02-04 RX ORDER — PIPERACILLIN SODIUM, TAZOBACTAM SODIUM 3; .375 G/15ML; G/15ML
3.38 INJECTION, POWDER, LYOPHILIZED, FOR SOLUTION INTRAVENOUS EVERY 8 HOURS
Status: DISCONTINUED | OUTPATIENT
Start: 2025-02-04 | End: 2025-02-04

## 2025-02-04 RX ORDER — IBUPROFEN 200 MG
200 TABLET ORAL EVERY 6 HOURS PRN
Status: DISCONTINUED | OUTPATIENT
Start: 2025-02-04 | End: 2025-02-06 | Stop reason: HOSPADM

## 2025-02-04 RX ORDER — SODIUM CHLORIDE 9 MG/ML
INJECTION, SOLUTION INTRAVENOUS CONTINUOUS
Status: DISCONTINUED | OUTPATIENT
Start: 2025-02-04 | End: 2025-02-05

## 2025-02-04 RX ADMIN — ACETAMINOPHEN 975 MG: 325 TABLET ORAL at 16:32

## 2025-02-04 RX ADMIN — PIPERACILLIN AND TAZOBACTAM 3.38 G: 3; .375 INJECTION, POWDER, FOR SOLUTION INTRAVENOUS at 11:45

## 2025-02-04 RX ADMIN — POLYETHYLENE GLYCOL 3350 17 G: 17 POWDER, FOR SOLUTION ORAL at 08:00

## 2025-02-04 RX ADMIN — SODIUM CHLORIDE: 9 INJECTION, SOLUTION INTRAVENOUS at 13:47

## 2025-02-04 RX ADMIN — SENNOSIDES AND DOCUSATE SODIUM 1 TABLET: 50; 8.6 TABLET ORAL at 08:00

## 2025-02-04 RX ADMIN — Medication 400 MG: at 06:35

## 2025-02-04 RX ADMIN — SODIUM CHLORIDE 500 ML: 9 INJECTION, SOLUTION INTRAVENOUS at 01:38

## 2025-02-04 RX ADMIN — SODIUM CHLORIDE 500 ML: 9 INJECTION, SOLUTION INTRAVENOUS at 03:02

## 2025-02-04 RX ADMIN — HYDROMORPHONE HYDROCHLORIDE 0.2 MG: 0.2 INJECTION, SOLUTION INTRAMUSCULAR; INTRAVENOUS; SUBCUTANEOUS at 22:07

## 2025-02-04 RX ADMIN — IBUPROFEN 200 MG: 200 TABLET, FILM COATED ORAL at 14:02

## 2025-02-04 RX ADMIN — SENNOSIDES AND DOCUSATE SODIUM 1 TABLET: 50; 8.6 TABLET ORAL at 20:54

## 2025-02-04 RX ADMIN — Medication 400 MG: at 02:58

## 2025-02-04 RX ADMIN — PIPERACILLIN AND TAZOBACTAM 3.38 G: 3; .375 INJECTION, POWDER, FOR SOLUTION INTRAVENOUS at 16:51

## 2025-02-04 RX ADMIN — ACETAMINOPHEN 975 MG: 325 TABLET ORAL at 07:57

## 2025-02-04 RX ADMIN — IBUPROFEN 200 MG: 200 TABLET, FILM COATED ORAL at 20:54

## 2025-02-04 RX ADMIN — OXYCODONE HYDROCHLORIDE 5 MG: 5 TABLET ORAL at 04:58

## 2025-02-04 ASSESSMENT — ACTIVITIES OF DAILY LIVING (ADL)
ADLS_ACUITY_SCORE: 24
ADLS_ACUITY_SCORE: 24
ADLS_ACUITY_SCORE: 28
ADLS_ACUITY_SCORE: 18
ADLS_ACUITY_SCORE: 32
ADLS_ACUITY_SCORE: 24
ADLS_ACUITY_SCORE: 18
ADLS_ACUITY_SCORE: 32
ADLS_ACUITY_SCORE: 32
ADLS_ACUITY_SCORE: 28
ADLS_ACUITY_SCORE: 24
ADLS_ACUITY_SCORE: 24
ADLS_ACUITY_SCORE: 32
ADLS_ACUITY_SCORE: 32
ADLS_ACUITY_SCORE: 18
ADLS_ACUITY_SCORE: 24
ADLS_ACUITY_SCORE: 24
ADLS_ACUITY_SCORE: 18
ADLS_ACUITY_SCORE: 18
ADLS_ACUITY_SCORE: 32
ADLS_ACUITY_SCORE: 28
ADLS_ACUITY_SCORE: 24
ADLS_ACUITY_SCORE: 24

## 2025-02-04 NOTE — PLAN OF CARE
PRIMARY DIAGNOSIS: ACUTE PAIN  OUTPATIENT/OBSERVATION GOALS TO BE MET BEFORE DISCHARGE:  1. Pain Status: Managed with oral oxycodone at lowest dose.     2. Return to near baseline physical activity: Yes...lower blood pressures throughout the shift, ambulated to restroom, no void yet.     3. Cleared for discharge by consultants (if involved): No    Discharge Planner Nurse   Safe discharge environment identified: Yes  Barriers to discharge: No       Entered by: Genaro Spring RN 02/04/2025 3:17 AM       Problem: Adult Inpatient Plan of Care  Goal: Absence of Hospital-Acquired Illness or Injury  Intervention: Prevent Skin Injury  Recent Flowsheet Documentation  Taken 2/4/2025 0100 by Genaro Spring RN  Body Position: supine, head elevated  Taken 2/3/2025 2300 by Genaro Spring RN  Body Position: supine, head elevated     Problem: Adult Inpatient Plan of Care  Goal: Absence of Hospital-Acquired Illness or Injury  Intervention: Prevent and Manage VTE (Venous Thromboembolism) Risk  Recent Flowsheet Documentation  Taken 2/4/2025 0100 by Genaro Spring RN  VTE Prevention/Management: SCDs on (sequential compression devices)  Taken 2/3/2025 2300 by Genaro Spring RN  VTE Prevention/Management: SCDs on (sequential compression devices)

## 2025-02-04 NOTE — CONSULTS
General Surgery Consultation  Emilia Mccormick MRN# [id number]   Age/Sex: 59 year old female YOB: 1965     Reason for consult: Abdominal pain         Requesting physician: Dr. Fields                    Assessment and Plan:   Assessment:   Perforated appendicitis   Leukocytosis secondary to #1    59-year-old female presenting with abdominal pain secondary to perforated appendicitis.  Patient's leukocytosis is secondary to perforated appendicitis.  The CT scan does not show a formal collection of abscess.  There are some fluid collection on the outside.  After review of the CT scan, I do believe that we are catching it early enough where we still have an option of surgical intervention for an appendectomy with drain placement and abdominal wound washout.    Plan:  -Will plan to proceed with a laparoscopic appendectomy with drain placement.  If there is too much inflammation and there is concerns for injury to the surrounding bowel or structures, I may decide to just do an abdominal washout with drain placement instead of converting to an open procedure for ileocecectomy.  - The risks and benefits of the procedure were explained detail to the patient. The risks include infection, bleeding, damage to the surrounding structures. Patient verbalized understanding provided consent to undergo the procedure above.  - continue with abx   - med mgmt per primary           Chief Complaint:     Chief Complaint   Patient presents with    Abdominal Pain    Dysuria        History is obtained from the patient    HPI:   Emilia Mccormick is a 59 year old female who presents to the hospital complaints of abdominal pain.  Patient states that the abdominal pain started approximately 2 days ago.  During her evaluation in the emergency room, patient was found to have CT findings of perforated appendix.  The CT scan shows that there is a small foci of free air with some fluid in the pelvis.  There is no abscess  formation yet.  General surgery team evaluated the patient.  Patient's abdominal pain still in the right lower quadrant of the abdomen.  Patient has no nausea no vomiting at this time.  Patient did have abdominal pain associated with fevers and chills. + dysuria.          Past Medical History:     Past Medical History:   Diagnosis Date    Arthritis [id number]    Kidney stone     Osteoporosis     Thalassemia     Thyroid disease 1/1/20000    Reslolved, thyroid lobectomy in 2000              Past Surgical History:     Past Surgical History:   Procedure Laterality Date    COLONOSCOPY  2013    COLONOSCOPY N/A 1/31/2024    Procedure: COLONOSCOPY, WITH POLYPECTOMY AND BIOPSY;  Surgeon: Daniel Hu DO;  Location: [facility]    ENDOMETRIAL ABLATION      HEAD & NECK SURGERY  2000    Thyroid Lobectomy (Huerthle cell adenoma)    THYROID SURGERY  01/01/2000    lobectomy    TUBAL LIGATION      URETEROSCOPY               Social History:    reports that she has quit smoking. Her smoking use included cigarettes. She has never been exposed to tobacco smoke. She has never used smokeless tobacco. She reports current alcohol use. She reports that she does not currently use drugs after having used the following drugs: Marijuana.           Family History:     Family History   Problem Relation Age of Onset    Other - See Comments Mother         unknown bio mothers's History    Cancer Father         skin    Heart Disease Father     Coronary Artery Disease Father     Substance Abuse Father         Alcoholic, in recovery for long time    Osteoporosis Father     Chronic Obstructive Pulmonary Disease Father         SMOKING    Osteoporosis Paternal Grandmother     Cancer Paternal Grandfather         lung - Smoking    Breast Cancer Paternal Aunt 70    Urolithiasis No family hx of     Clotting Disorder No family hx of     Diabetes No family hx of     Gout No family hx of               Allergies:     Allergies   Allergen Reactions     Escitalopram Unknown     Other Reaction(s): Weight gain/tired    Penicillin V      Other Reaction(s): Itching    Penicillins Unknown              Medications:     Prior to Admission medications    Medication Sig Start Date End Date Taking? Authorizing Provider   Calcium Carb-Magnesium Carb 250-300 MG TABS Take 1 tablet by mouth 2 times daily.   Yes Reported, Patient   estradiol (ESTRACE) 0.1 MG/GM vaginal cream Place 1 g vaginally three times a week. 11/20/24  Yes Shantelle Ornelas APRN CNP   multivitamin (ONE A DAY) per tablet [MULTIVITAMIN (ONE A DAY) PER TABLET] Take 1 tablet by mouth daily. 6/8/15  Yes Provider, Historical   phentermine (ADIPEX-P) 37.5 MG tablet Take 0.5-1 tablets (18.75-37.5 mg) by mouth every morning (before breakfast). 9/10/24 3/9/25 Yes Vicki Hernández MD   psyllium (METAMUCIL/KONSYL) capsule Take 2-3 capsules by mouth at bedtime.   Yes Unknown, Entered By History   Vitamin D3 (CHOLECALCIFEROL) 125 MCG (5000 UT) tablet Take 1 tablet by mouth daily.   Yes Unknown, Entered By History              Review of Systems:   The Review of Systems is negative other than noted in the HPI            Physical Exam:   Patient Vitals for the past 24 hrs:   BP Temp Temp src Pulse Resp SpO2 Height Weight   02/03/25 1830 104/60 -- -- 79 -- 92 % -- --   02/03/25 1815 104/62 -- -- 85 -- 98 % -- --   02/03/25 1800 108/61 -- -- 82 -- 99 % -- --   02/03/25 1745 115/66 -- -- 86 -- 99 % -- --   02/03/25 1645 112/59 -- -- 91 -- 97 % -- --   02/03/25 1630 110/55 -- -- 91 -- 97 % -- --   02/03/25 1135 126/59 98.7  F (37.1  C) Oral 90 16 98 % 1.524 m (5') 54.4 kg (120 lb)        No intake or output data in the 24 hours ending 02/03/25 1849   Constitutional:   awake, alert, cooperative, no apparent distress, and appears stated age       Eyes:   PERRL, conjunctiva/corneas clear, EOM's intact; no scleral edema or icterus noted        ENT:   Normocephalic, without obvious abnormality, atraumatic, Lips, mucosa, and  tongue normal        Hematologic / Lymphatic:   No lymphadenopathy       Lungs:   Normal respiratory effort, no accessory muscle use       Cardiovascular:   Regular rate and rhythm       Abdomen:   Soft, minimally distended, tender to palpation right lower quadrant.  Positive Rogers sign.       Musculoskeletal:   No obvious swelling, bruising or deformity       Skin:   Skin color and texture normal for patient, no rashes or lesions              Data:        All imaging studies reviewed by me.  I personally reviewed the imagings and agree with perforated appendicitis with fluid collection.      Rajan Ospina DO  General Surgeon  [facility]  Surgery [facility] - [address] [address]?  Office: [phone]  Employed by - [facility]  Pager: [phone]

## 2025-02-04 NOTE — OP NOTE
[facility]    Operative Note    Pre-operative diagnosis: Acute appendicitis with perforation and generalized peritonitis, without abscess or gangrene [K35.201]  Post-operative diagnosis Same as pre-operative diagnosis    Procedure: APPENDECTOMY, LAPAROSCOPIC, N/A - Abdomen    Surgeon: Surgeons and Role:     * Rajan Ospina DO - Primary  Anesthesia: General   Estimated Blood Loss: 20 mL from 2/3/2025  8:28 PM to 2/3/2025 10:05 PM      Drains: Placement of 19 Fr marla drain at the suprapubic trochar site  Specimens:   ID Type Source Tests Collected by Time Destination   1 :  Tissue Appendix SURGICAL PATHOLOGY EXAM Rajan Ospina DO 2/3/2025  9:13 PM      Findings:     - ruptured appendix at the mesenteric side near the middle of the appendix  - free purulent fluid throughout the quadrants of the abdomen and pelvis.     Complications: None.  Implants: * No implants in log *    Indication: 59 year old female presenting with abdominal pain.  She was worked up and found to have a ruptured appendix with free fluid with no formed abscess.  After evaluation offered patient procedure of laparoscopic appendectomy with drain placement.  The risks and benefits of the procedure were explained detail to the patient. The risks include infection, bleeding, damage to the surrounding structures. Patient verbalized understanding provided consent to undergo the procedure above.      Procedure: The patient was brought to the operating room and placed on the operative table in a supine position.  Patient had bilateral upper extremities tucked and padded in the usual fashion.  Patient underwent sedation and intubation by the anesthesia team.  The patient then had his abdomen and draped in the usual sterile fashion.  Prior to initiating the procedure, a timeout was completed.  All present were in agreement.    1% lidocaine with epinephrine was instilled in Tello's point in the left upper quadrant.  An 11 blade was  then used to make a small skin incision.  The Veress needle was then inserted into the patient's abdomen.  A saline drop test was then completed.  Insufflation was then initiated.  A 5 mm port was then inserted infraumbilically using a Visiport.  A 5 mm port was then placed in the suprapubic region.  A 12 mm port was placed in the left lower quadrant.  Both ports were placed under direct visualization.    A general survey was completed.  I could identify that the patient had acute appendicitis with the perforated appendix being walled off with the omentum.  There is purulent material throughout the right lower quadrant pelvis and in the right upper quadrant of the abdomen.  There was also some purulent material that was discovered in the left upper quadrant as well as in the left lower quadrant.  Due to the perforation, there was fibrinous rind throughout the cecum as well as the terminal ileum and the appendiceal veil.     Through careful dissection, the omentum was carefully retracted off of the abscess pocket to reveal the terminal ileum as well as the cecum.  Using a combination of blunt dissection as well as cauterization with the LigaSure, I was able to separate the appendix away from the surrounding structures.  I was able to take the mesoappendix using the LigaSure down to the base.  Once at the base, I could identify that the appendix did indeed perforated at the midportion of the appendix at the antimesenteric side.  Due to there being a concern for an appendicolith at the base of the appendix, I did carefully milked the base of the appendix to ensure that there was no appendicolith present before stapling.  After ensuring that the tissue of the base of the appendix was healthy, a 45 blue load stapler was then used to transect the appendix at its base.  The appendix was then placed into a 10 mm bag and removed from the abdomen via the 12 port.    At this point, I proceeded to perform an abdominal washout.   The abdomen was irrigated with 4 L of normal saline to ensure that all of the purulent contamination from the ruptured appendix within the pelvis and all of the other quadrants were removed.  After removing all of the irrigant, a 19 Taiwanese Heladio drain was then placed into the suprapubic trocar site with the tip of the catheter in the pelvis.  This catheter was anchored onto the skin using a 3-0 nylon suture.    The fascia of the 12 mm port was then closed using an 0 Vicryl stitch with a suture passer.  A 3-0 Vicryl stitch was used to perform deep dermal sutures at the infraumbilical port site.  All surgical sites were then closed using a 4-0 Vicryl stitch in a subcuticular fashion.  Surgical glue was then used to reinforce all surgical sites.  At the end of the procedure a final count was completed.  I was told that all sharps, sponges, instruments were accounted for.        Walled off abscess pocket and purulent drainage from the perforated appendix        Site of appendiceal perforation        Staple line holding with no bleeding      Rajan Ospina DO  General Surgeon  [facility]  Surgery [address] [address]?  Office: [phone]  Employed by - [facility]  Pager: [phone]  Cell: [phone]

## 2025-02-04 NOTE — PROVIDER NOTIFICATION
Dr. Chan notified of patient blood pressures running mid 80s systolic. Patient in room 370 BPs are 85/55. Background: she states she runs lower blood pressures around 95 systolic and since surgery they have been running around that, however the most recent ones have been 85 systolic. She denies dizziness and is asymptomatic at this time. Her incisions are clean, dry and intact and her JUSTINE drain is about 1/4 full. I checked both arms with the same result in blood pressures. Action: she is currently running 50 ml/hr NS per the order. Recommendation: any additional fluids or other recommendations?     Ordered 500 ml bolus and lab work.

## 2025-02-04 NOTE — ANESTHESIA CARE TRANSFER NOTE
Patient: Emilia Mccormick    Procedure: Procedure(s):  APPENDECTOMY, LAPAROSCOPIC       Diagnosis: Acute appendicitis with perforation and generalized peritonitis, without abscess or gangrene [K35.201]  Diagnosis Additional Information: No value filed.    Anesthesia Type:   General     Note:    Oropharynx: oropharynx clear of all foreign objects  Level of Consciousness: awake  Oxygen Supplementation: face mask  Level of Supplemental Oxygen (L/min / FiO2): 8  Independent Airway: airway patency satisfactory and stable  Dentition: dentition unchanged  Vital Signs Stable: post-procedure vital signs reviewed and stable  Report to RN Given: handoff report given  Patient transferred to: PACU    Handoff Report: Identifed the Patient, Identified the Reponsible Provider, Reviewed the pertinent medical history, Discussed the surgical course, Reviewed Intra-OP anesthesia mangement and issues during anesthesia, Set expectations for post-procedure period and Allowed opportunity for questions and acknowledgement of understanding      Vitals:  Vitals Value Taken Time   /52 02/03/25 2210   Temp 36.9  C (98.5  F) 02/03/25 2207   Pulse 89 02/03/25 2210   Resp 13 02/03/25 2210   SpO2 100 % 02/03/25 2210       Electronically Signed By: JOSE Peguero CRNA  February 3, 2025  10:10 PM

## 2025-02-04 NOTE — PROGRESS NOTES
"General Surgery Progress Note:    Hospital Day # 1    ASSESSMENT:  1. Acute appendicitis with perforation and generalized peritonitis, without abscess or gangrene    2. Perforated appendicitis        Emilia Mccormick is a 59 year old female who is s/p laparoscopic appendectomy with abdominal washout and drain placed on 02/3/2025 d/t perforated acute appendicitis.     Postoperative course notable for hypotension (BP sys 80-90s) without tachycardia or fever overnight. IVF bolus ordered. Appearing asymptomatic. Labs with persistent severe leukocytosis with slight downtrend (17.6<22.6), Hgb with downtrend (9.4<12.3), 4 hour POC Hgb fairly stable (9.2<9.4). Her pain is appearing well controlled. She is well appearing but fairly tender in the lower quadrants, incisions are CDI. Drain with small volume sanguinous OP with faint purulence, bulb holding suction. Endorse flatus this morning.     PLAN:  -CLD as tolerated, recommend to go slow with diet in the setting of ileus risk     -if persistent ROBF later today vs tomorrow, plan for further diet upgrade if able  -IV abx per [facility]  -Drain to remain, drain cares    -record OP  -Encourage movement/activity  -Continue medical mgmt per [facility]  -Surgery to follow    SUBJECTIVE:   She is reporting doing \"okay\". States pain is rated 2-3/10 upon rest but exacerbates upon movement. Has had \"sips\" of water overnight for which she has tolerated. Anticipated CLD trial this morning. Reports new onset flatus this morning. Has not voided. Postoperatively has transferred out of bed to go to the bathroom.       Patient Vitals for the past 24 hrs:   BP Temp Temp src Pulse Resp SpO2 Height Weight   02/04/25 0753 (!) 88/50 97.7  F (36.5  C) Oral 64 20 95 % -- --   02/04/25 0640 90/54 -- -- -- -- 94 % -- --   02/04/25 0600 93/51 97.9  F (36.6  C) Oral 77 20 95 % -- --   02/04/25 0458 97/55 -- -- -- -- -- -- --   02/04/25 0406 93/54 -- -- -- -- -- -- --   02/04/25 0336 (!) 87/50 98.2  F (36.8 "  C) Axillary 76 21 94 % -- --   02/04/25 0250 (!) 86/50 -- -- -- -- 95 % -- --   02/04/25 0200 97/52 98.1  F (36.7  C) Oral 74 11 95 % -- --   02/04/25 0100 (!) 86/49 98.1  F (36.7  C) Oral 65 11 95 % -- --   02/04/25 0000 98/53 97.3  F (36.3  C) Oral 69 17 94 % -- --   02/03/25 2330 97/51 97.4  F (36.3  C) Oral 77 15 95 % -- --   02/03/25 2300 95/61 97.6  F (36.4  C) Oral 86 13 94 % -- --   02/03/25 2240 96/55 -- -- 84 16 100 % -- --   02/03/25 2239 -- -- -- -- -- 100 % -- --   02/03/25 2230 100/57 99.6  F (37.6  C) Temporal 91 16 100 % -- --   02/03/25 2227 -- -- -- -- -- 100 % -- --   02/03/25 2220 97/55 -- -- 84 10 100 % -- --   02/03/25 2215 -- -- -- -- -- 100 % -- --   02/03/25 2210 102/52 -- -- 89 13 100 % -- --   02/03/25 2207 104/58 98.5  F (36.9  C) Temporal 112 12 97 % -- --   02/03/25 1900 112/58 98.6  F (37  C) Temporal 84 16 97 % -- --   02/03/25 1830 104/60 -- -- 79 -- 92 % -- --   02/03/25 1815 104/62 -- -- 85 -- 98 % -- --   02/03/25 1800 108/61 -- -- 82 -- 99 % -- --   02/03/25 1745 115/66 -- -- 86 -- 99 % -- --   02/03/25 1645 112/59 -- -- 91 -- 97 % -- --   02/03/25 1630 110/55 -- -- 91 -- 97 % -- --   02/03/25 1135 126/59 98.7  F (37.1  C) Oral 90 16 98 % 1.524 m (5') 54.4 kg (120 lb)         PHYSICAL EXAM:  General: patient seen resting in bed, no acute distress  Resp: no respiratory distress, breathing comfortably on room air.   Abdomen: soft, mild/moderately distended and is tender in R<L lower quadrants associated with guarding, no rebound appreciated. Laparoscopic sites are CDI. Drain with small volume sanguinous fluid with scant purulence, bulb on suction. Serosanguinous saturation to drain sponge, changed by RN staff.     Output by Drain (mL) 02/02/25 0700 - 02/02/25 1459 02/02/25 1500 - 02/02/25 2259 02/02/25 2300 - 02/03/25 0659 02/03/25 0700 - 02/03/25 1459 02/03/25 1500 - 02/03/25 2259 02/03/25 2300 - 02/04/25 0659 02/04/25 0700 - 02/04/25 1049   Closed/Suction Drain 1 Left LLQ Bulb  19 Khmer     10 30       Extremities: warm and well perfused    02/03 0700 - 02/04 0659  In: 1200 [I.V.:1200]  Out: 360 [Urine:300; Drains:40]    Admission on 02/03/2025   Component Date Value    Color Urine 02/03/2025 Yellow     Appearance Urine 02/03/2025 Clear     Glucose Urine 02/03/2025 Negative     Bilirubin Urine 02/03/2025 Negative     Ketones Urine 02/03/2025 20 (A)     Specific Gravity Urine 02/03/2025 1.028     Blood Urine 02/03/2025 0.2 mg/dL (A)     pH Urine 02/03/2025 6.0     Protein Albumin Urine 02/03/2025 30 (A)     Urobilinogen Urine 02/03/2025 <2.0     Nitrite Urine 02/03/2025 Negative     Leukocyte Esterase Urine 02/03/2025 Negative     Mucus Urine 02/03/2025 Present (A)     RBC Urine 02/03/2025 5 (H)     WBC Urine 02/03/2025 1     Squamous Epithelials Uri* 02/03/2025 1     WBC Count 02/03/2025 22.6 (H)     RBC Count 02/03/2025 5.86 (H)     Hemoglobin 02/03/2025 12.3     Hematocrit 02/03/2025 37.6     MCV 02/03/2025 64 (L)     MCH 02/03/2025 21.0 (L)     MCHC 02/03/2025 32.7     RDW 02/03/2025 16.8 (H)     Platelet Count 02/03/2025 207     Sodium 02/03/2025 139     Potassium 02/03/2025 4.1     Chloride 02/03/2025 102     Carbon Dioxide (CO2) 02/03/2025 25     Anion Gap 02/03/2025 12     Urea Nitrogen 02/03/2025 20.4     Creatinine 02/03/2025 0.75     GFR Estimate 02/03/2025 90     Calcium 02/03/2025 10.1     Glucose 02/03/2025 152 (H)     Lipase 02/03/2025 11 (L)     Magnesium 02/03/2025 1.9     Protein Total 02/03/2025 7.9     Albumin 02/03/2025 4.6     Bilirubin Total 02/03/2025 0.7     Alkaline Phosphatase 02/03/2025 90     AST 02/03/2025 23     ALT 02/03/2025 21     Bilirubin Direct 02/03/2025 <0.20     Radiologist flags 02/03/2025 Perforated appendicitis (AA)     RBC Morphology 02/03/2025 Confirmed RBC Indices     Platelet Assessment 02/03/2025 Automated Count Confirmed. Platelet morphology is normal.     TSH 02/03/2025 0.68     Phosphorus 02/04/2025 2.8     Sodium 02/04/2025 138      Potassium 02/04/2025 3.9     Carbon Dioxide (CO2) 02/04/2025 24     Anion Gap 02/04/2025 8     Urea Nitrogen 02/04/2025 20.0     Creatinine 02/04/2025 0.69     GFR Estimate 02/04/2025 >90     Calcium 02/04/2025 8.1 (L)     Chloride 02/04/2025 106     Glucose 02/04/2025 158 (H)     Alkaline Phosphatase 02/04/2025 66     AST 02/04/2025 21     ALT 02/04/2025 18     Protein Total 02/04/2025 6.0 (L)     Albumin 02/04/2025 3.4 (L)     Bilirubin Total 02/04/2025 0.4     Magnesium 02/04/2025 1.8     WBC Count 02/04/2025 17.6 (H)     RBC Count 02/04/2025 4.48     Hemoglobin 02/04/2025 9.4 (L)     Hematocrit 02/04/2025 28.7 (L)     MCV 02/04/2025 64 (L)     MCH 02/04/2025 21.0 (L)     MCHC 02/04/2025 32.8     RDW 02/04/2025 15.7 (H)     Platelet Count 02/04/2025 158     % Neutrophils 02/04/2025 88     % Lymphocytes 02/04/2025 5     % Monocytes 02/04/2025 5     % Eosinophils 02/04/2025 1     % Basophils 02/04/2025 0     % Immature Granulocytes 02/04/2025 1     NRBCs per 100 WBC 02/04/2025 0     Absolute Neutrophils 02/04/2025 15.4 (H)     Absolute Lymphocytes 02/04/2025 0.9     Absolute Monocytes 02/04/2025 0.9     Absolute Eosinophils 02/04/2025 0.2     Absolute Basophils 02/04/2025 0.0     Absolute Immature Granul* 02/04/2025 0.1     Absolute NRBCs 02/04/2025 0.0     RBC Morphology 02/04/2025 Confirmed RBC Indices     Platelet Assessment 02/04/2025 Automated Count Confirmed. Platelet morphology is normal.     Glucose 02/04/2025 148 (H)     Hold Specimen 02/04/2025 JIC     Hemoglobin 02/04/2025 9.2 (L)        Brigida Edgar PA-C  [facility]  Surgery [address]  [place] [address]?  Office: [phone]

## 2025-02-04 NOTE — PLAN OF CARE
Goal Outcome Evaluation:      Plan of Care Reviewed With: patient    Overall Patient Progress: improvingOverall Patient Progress: improving    Outcome Evaluation: Pt alert and oriented x 4. JUSTINE drain dressing saturated. Changed it with abd and split gauze. Bowel sound audiable, passing gas. Slow diet advancement per gen sx. Tolerating clears for breakfast. Bowel regimen given. IV zoysn and IVF infusing. Denies need for narcotic pain medications this shift. Voiding. Walked in the hallways. Discharge date/time pending.      Problem: Appendectomy  Goal: Fluid and Electrolyte Balance  Outcome: Progressing     Problem: Appendectomy  Goal: Absence of Infection Signs and Symptoms  Outcome: Progressing     Problem: Appendectomy  Goal: Effective Oxygenation and Ventilation  Outcome: Progressing     Problem: Appendectomy  Goal: Acceptable Pain Control  Outcome: Progressing  Intervention: Prevent or Manage Pain  Recent Flowsheet Documentation  Taken 2/4/2025 [id number] by Oliverio Steele, RN  Pain Management Interventions:   medication (see MAR)   distraction   emotional support   repositioned   rest   Oliverio Steele RN, ONC

## 2025-02-04 NOTE — ANESTHESIA PREPROCEDURE EVALUATION
Anesthesia Pre-Procedure Evaluation    Patient: Emilia Mccormick   MRN: [id number] : 1965        Procedure : Procedure(s):  APPENDECTOMY, LAPAROSCOPIC          Past Medical History:   Diagnosis Date     Arthritis [id number]     Kidney stone      Osteoporosis      Thalassemia      Thyroid disease     Reslolved, thyroid lobectomy in       Past Surgical History:   Procedure Laterality Date     COLONOSCOPY  2013     COLONOSCOPY N/A 2024    Procedure: COLONOSCOPY, WITH POLYPECTOMY AND BIOPSY;  Surgeon: Daniel Hu DO;  Location: [facility]     ENDOMETRIAL ABLATION       HEAD & NECK SURGERY      Thyroid Lobectomy (Huerthle cell adenoma)     THYROID SURGERY  2000    lobectomy     TUBAL LIGATION       URETEROSCOPY        Allergies   Allergen Reactions     Escitalopram Unknown     Other Reaction(s): Weight gain/tired     Penicillin V      Other Reaction(s): Itching     Penicillins Unknown      Social History     Tobacco Use     Smoking status: Former     Types: Cigarettes     Passive exposure: Never     Smokeless tobacco: Never     Tobacco comments:     Started smoking at age 10 - quit smoking 30 years ago. quit NRT - in     Substance Use Topics     Alcohol use: Yes     Comment: Occasional      Wt Readings from Last 1 Encounters:   25 54.4 kg (120 lb)        Anesthesia Evaluation            ROS/MED HX  ENT/Pulmonary:  - neg pulmonary ROS     Neurologic:  - neg neurologic ROS     Cardiovascular:  - neg cardiovascular ROS     METS/Exercise Tolerance:     Hematologic:  - neg hematologic  ROS     Musculoskeletal:  - neg musculoskeletal ROS     GI/Hepatic:  - neg GI/hepatic ROS     Renal/Genitourinary:     (+) renal disease,      Nephrolithiasis ,       Endo:     (+)          thyroid problem,            Psychiatric/Substance Use:  - neg psychiatric ROS     Infectious Disease:  - neg infectious disease ROS     Malignancy:       Other:  - neg other ROS        Physical  "Exam    Airway        Mallampati: II   TM distance: > 3 FB   Neck ROM: full   Mouth opening: > 3 cm    Respiratory Devices and Support         Dental       (+) Minor Abnormalities - some fillings, tiny chips      Cardiovascular   cardiovascular exam normal       Rhythm and rate: regular and normal     Pulmonary   pulmonary exam normal        breath sounds clear to auscultation       OUTSIDE LABS:  CBC:   Lab Results   Component Value Date    WBC 22.6 (H) 02/03/2025    WBC 6.3 08/29/2022    HGB 12.3 02/03/2025    HGB 11.2 (L) 08/29/2022    HCT 37.6 02/03/2025    HCT 35.0 08/29/2022     02/03/2025     08/29/2022     BMP:   Lab Results   Component Value Date     02/03/2025     08/26/2022    POTASSIUM 4.1 02/03/2025    POTASSIUM 4.0 08/26/2022    CHLORIDE 102 02/03/2025    CHLORIDE 105 08/26/2022    CO2 25 02/03/2025    CO2 26 08/26/2022    BUN 20.4 02/03/2025    BUN 10.5 08/26/2022    CR 0.75 02/03/2025    CR 0.67 08/26/2022     (H) 02/03/2025    GLC 82 08/26/2022     COAGS: No results found for: \"PTT\", \"INR\", \"FIBR\"  POC: No results found for: \"BGM\", \"HCG\", \"HCGS\"  HEPATIC:   Lab Results   Component Value Date    ALBUMIN 4.6 02/03/2025    PROTTOTAL 7.9 02/03/2025    ALT 21 02/03/2025    AST 23 02/03/2025    ALKPHOS 90 02/03/2025    BILITOTAL 0.7 02/03/2025     OTHER:   Lab Results   Component Value Date    RANJITH 10.1 02/03/2025    MAG 1.9 02/03/2025    LIPASE 11 (L) 02/03/2025    TSH 0.68 02/03/2025       Anesthesia Plan    ASA Status:  2       Anesthesia Type: General.     - Airway: ETT   Induction: Intravenous, Propofol.   Maintenance: Balanced.        Consents    Anesthesia Plan(s) and associated risks, benefits, and realistic alternatives discussed. Questions answered and patient/representative(s) expressed understanding.     - Discussed: Risks, Benefits and Alternatives for the PROCEDURE were discussed     - Discussed with:  Patient            Postoperative Care    Pain management: " IV analgesics, Oral pain medications, Multi-modal analgesia.   PONV prophylaxis: Ondansetron (or other 5HT-3), Dexamethasone or Solumedrol     Comments:             Jason Brooks MD    I have reviewed the pertinent notes and labs in the chart from the past 30 days and (re)examined the patient.  Any updates or changes from those notes are reflected in this note.    Clinically Significant Risk Factors Present on Admission

## 2025-02-04 NOTE — PROGRESS NOTES
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  2/3/2025 presented with 2-day history of right lower quadrant pain.  CT scan showed a perforated appendicitis with air and fluid levels.  No evidence of abscess.  Admitted.     S/p laparoscopic appendectomy w/ EBL of 20ccs. Overnight hypotensive SBP 85. Increasing MIVFs and monitor hgb carefully and continue zosyn.     Of note, discussed w/ surgery and pharmacy, the zosyn was retimed after it was held overnight, and appreciate both discussions-- today's pharmacist Diego has resolved this issue-- pt will receive zosyn 10:30, then 4:30p and then back to usual q8 hrs.  ----  S/p laparoscopic appendectomy  Perforated appendicitis  Acute blood loss anemia  hypOtension  A- as above. Follow pressures and anemia given delta -~3u. Some surgical blood loss but also hx of thalassemia.   P:  - monitor hgb q12 hrs and for signs/sx of bleed (add-on morning lab, and every 6a/p)  -transfuse if hgb <7  - workup further if dropping, ie ultrasound, scan  -Continue intravenous Zosyn q 8 hrs   - see above, because of the surgery and orders held/unheld, there was a timing issue - see italicized  -Dilaudid and Tylenol as needed, defer modification postop to surgery   - monitor for fevers and persistent hypotension, and if so, please page/inform MD/DO and would consider cultures  -also, pt has a hx of rash to zosyn but she tolerated a dose at 5:11pm on 2/3.    Leukocytosis  History of thalassemia  Initial white count 22.  Follow cbc w/ the acute blood loss anemia     History of Hurthle cell adenoma  History of thyroid lobectomy  Check TSH was wnl.     Vaginal atrophy  Hold on her estrogen cream at this time, keep hold,  theoretical increased risk of clot formation  Hold patient's phentermine (she has been taking this for weight loss), including postop     History of nephrolithiasis  Noted.     Code Status: Full Code     Disposition: Inpatient   Medically Ready for Discharge: Anticipated in 1-2 days  Medically Ready for Discharge: Anticipated in 2-4 Days           Diet: Advance Diet as Tolerated: Clear Liquid Diet    DVT Prophylaxis: post ~3units hgb delta drop, BP SBP mid-80s, holding of on pharmaceutical unless surgery team would like with acute blood loss anemia  Byrnes Catheter: Not present  Lines: None     Cardiac Monitoring: None  Code Status: Full Code      Clinically Significant Risk Factors Present on Admission           # Hypocalcemia: Lowest Ca = 8.1 mg/dL in last 2 days, will monitor and replace as appropriate     # Hypoalbuminemia: Lowest albumin = 3.4 g/dL at 2/4/2025  1:58 AM, will monitor as appropriate          # Anemia: based on hgb <11                  Social Drivers of Health    Tobacco Use: Medium Risk (12/10/2024)    Patient History     Smoking Tobacco Use: Former     Smokeless Tobacco Use: Never     Passive Exposure: Never   Social Connections: Unknown (11/18/2024)    Social Connection and Isolation Panel [NHANES]     Frequency of Social Gatherings with Friends and Family: Once a week          Disposition Plan    Medically Ready for Discharge: Anticipated in 1-2 days  Medically Ready for Discharge: Anticipated in 2-4 Days          Timothy Trinidad MD  Hospitalist Service  [employer]  Securely message with [employer] (more info)  Text page via [employer] Paging/Directory   ______________________________________________________________________    Interval History   S/p lap alexey overnight  ~3units hgb delta drop, BP SBP mid-80s  Pt has no new symptoms  Discussed with RN; plan to increase MIVFs to 75cc/hr, monitor hgb and for fever  Pt had missed doses of zosyn after surgery overnight-- discussed w/  both surgery and pharmacy, the zosyn was retimed after it was held/unheld overnight, and appreciate both discussions-- today's pharmacist Diego has resolved this issue-- pt will receive zosyn at 10:30a, then 4:30p and then back to usual q8 hrs.    Physical Exam   Vital Signs: Temp: 97.9  F (36.6  C) Temp src: Oral BP: 90/54 Pulse: 77   Resp: 20 SpO2: 94 % O2 Device: None (Room air) Oxygen Delivery: 1 LPM  Weight: 120 lbs 0 oz    General: alert, oriented, and in no acute distress  Pulmonary: clear to auscultation bilaterally, normal respiratory effort, on room air, no rales or wheezes or evidence of accessory muscle use  CVS: regular rate and rhythm, no murmurs, rubs, or gallops; no blatant jugular venous distention; no extremity edema and extremities are warm to the touch  GI: soft, nontender, surgical dressing is stable and dry and intact with some dried bleeding underneath dressing visible, but surrounding skin no changes BS+, no rebound or guarding   Neuro: nonfocal, moves all extremities of own volition  Psych: appropriate       Medical Decision Making       55 MINUTES SPENT BY ME on the date of service doing chart review, history, exam, documentation & further activities per the note.      Data   ------------------------- PAST 24 HR DATA REVIEWED -----------------------------------------------    I have personally reviewed the following data over the past 24 hrs:    17.6 (H)  \   9.4 (L)   / 158     138 106 20.0 /  148 (H)   3.9 24 0.69 \     ALT: 18 AST: 21 AP: 66 TBILI: 0.4   ALB: 3.4 (L) TOT PROTEIN: 6.0 (L) LIPASE: 11 (L)     TSH: 0.68 T4: N/A A1C: N/A       Imaging results reviewed over the past 24 hrs:   Recent Results (from the past 24 hours)   CT Abdomen Pelvis w Contrast   Result Value    Radiologist flags Perforated appendicitis (AA)    Narrative    EXAM: CT ABDOMEN PELVIS W CONTRAST  LOCATION: [facility]  DATE: 2/3/2025    INDICATION: pain RLQ, appendicitis?  COMPARISON: CT  abdomen pelvis without contrast 6/8/2015  TECHNIQUE: CT scan of the abdomen and pelvis was performed following injection of IV contrast. Multiplanar reformats were obtained. Dose reduction techniques were used.  CONTRAST: 90ml Isovue 370    FINDINGS:   LOWER CHEST: Normal.    HEPATOBILIARY: Diffuse hepatic steatosis. No significant mass. No bile duct dilatation. No calcified gallstones.    PANCREAS: Normal.    SPLEEN: Normal.    ADRENAL GLANDS: Normal.    KIDNEYS/BLADDER: No significant mass, stones, or hydronephrosis. There are simple or benign cysts. No follow up is needed.    BOWEL: The appendix is enlarged and has mucosal hyperenhancement. There is an appendicolith at the base of the appendix. Periappendiceal inflammatory stranding is present which extends to the base of the cecum. Tiny foci of free air are present in the   right lower quadrant (series 3, images 119-122). Free fluid layers into the pelvis. There is peritoneal thickening anterior and posterior in the right lower quadrant. No focal intra-abdominal fluid collection. No dilated large or small bowel.    LYMPH NODES: Mildly enlarged, reactive lymph nodes are present in the mesentery to the right lower quadrant.    VASCULATURE: Patchy, mild to moderate atheromatous calcifications are present in the abdominal aorta and iliac arteries. No aneurysm or critical stenosis.    PELVIC ORGANS: Normal.    MUSCULOSKELETAL: Low thoracic and lumbar vertebra are maintained in height and shape.      Impression    IMPRESSION:     Acute appendicitis complicated by appendix perforation with small foci of free air, peritoneal thickening and layering fluid in the pelvis. No organized intra-abdominal fluid collection to suggest abscess.      [Critical Result: Perforated appendicitis]    Finding was identified on 2/3/2025 4:19 PM CST.     Merline LIND in [facility] was contacted by me on 2/3/2025 4:25 PM CST and verbalized understanding of the critical result.

## 2025-02-04 NOTE — ANESTHESIA PROCEDURE NOTES
Airway       Patient location during procedure: OR       Procedure Start/Stop Times: 2/3/2025 8:39 PM and 2/3/2025 8:39 PM  Staff -        Anesthesiologist:  Jason Brooks MD       CRNA: Melita Monterroso APRN CRNA       Performed By: CRNAIndications and Patient Condition       Indications for airway management: cy-procedural       Induction type:intravenous       Mask difficulty assessment: 1 - vent by mask    Final Airway Details       Final airway type: endotracheal airway       Successful airway: ETT - single  Endotracheal Airway Details        ETT size (mm): 7.0       Cuffed: yes       Successful intubation technique: direct laryngoscopy       DL Blade Type: Koch 2       Grade View of Cords: 1       Position: Right       Measured from: gums/teeth       Secured at (cm): 20       Bite block used: None    Post intubation assessment        Placement verified by: capnometry, equal breath sounds and chest rise        Number of attempts at approach: 1       Number of other approaches attempted: 0       Secured with: tape       Ease of procedure: easy       Dentition: Intact and Unchanged       Dental guard used and removed. Dental Guard Type: Standard White.    Medication(s) Administered   Medication Administration Time: 2/3/2025 8:39 PM

## 2025-02-04 NOTE — ANESTHESIA POSTPROCEDURE EVALUATION
Patient: Emilia Mccormick    Procedure: Procedure(s):  APPENDECTOMY, LAPAROSCOPIC       Anesthesia Type:  General    Note:  Disposition: Inpatient   Postop Pain Control: Uneventful            Sign Out: Well controlled pain   PONV: No   Neuro/Psych: Uneventful            Sign Out: Acceptable/Baseline neuro status   Airway/Respiratory: Uneventful            Sign Out: Acceptable/Baseline resp. status; O2 supplementation               Oxygen: Nasal Cannula   CV/Hemodynamics: Uneventful            Sign Out: Acceptable CV status; No obvious hypovolemia; No obvious fluid overload   Other NRE: NONE   DID A NON-ROUTINE EVENT OCCUR? No       Last vitals:  Vitals Value Taken Time   BP 96/55 02/03/25 2242   Temp 37.6  C (99.6  F) 02/03/25 2230   Pulse 81 02/03/25 2248   Resp 16 02/03/25 2240   SpO2 97 % 02/03/25 2248   Vitals shown include unfiled device data.    Electronically Signed By: Jason Brooks MD  February 3, 2025  11:05 PM

## 2025-02-05 LAB
ELLIPTOCYTES BLD QL SMEAR: SLIGHT
ERYTHROCYTE [DISTWIDTH] IN BLOOD BY AUTOMATED COUNT: 15.9 % (ref 10–15)
GLUCOSE BLDC GLUCOMTR-MCNC: 78 MG/DL (ref 70–99)
HCT VFR BLD AUTO: 27 % (ref 35–47)
HGB BLD-MCNC: 8.7 G/DL (ref 11.7–15.7)
HGB BLD-MCNC: 8.7 G/DL (ref 11.7–15.7)
HGB BLD-MCNC: 9.5 G/DL (ref 11.7–15.7)
MAGNESIUM SERPL-MCNC: 2.2 MG/DL (ref 1.7–2.3)
MCH RBC QN AUTO: 20.8 PG (ref 26.5–33)
MCHC RBC AUTO-ENTMCNC: 31.9 G/DL (ref 31.5–36.5)
MCV RBC AUTO: 66 FL (ref 78–100)
PATH REPORT.COMMENTS IMP SPEC: NORMAL
PATH REPORT.COMMENTS IMP SPEC: NORMAL
PATH REPORT.FINAL DX SPEC: NORMAL
PATH REPORT.GROSS SPEC: NORMAL
PATH REPORT.MICROSCOPIC SPEC OTHER STN: NORMAL
PATH REPORT.RELEVANT HX SPEC: NORMAL
PHOTO IMAGE: NORMAL
PLAT MORPH BLD: ABNORMAL
PLATELET # BLD AUTO: 161 10E3/UL (ref 150–450)
POLYCHROMASIA BLD QL SMEAR: SLIGHT
POTASSIUM SERPL-SCNC: 3.4 MMOL/L (ref 3.4–5.3)
POTASSIUM SERPL-SCNC: 3.7 MMOL/L (ref 3.4–5.3)
RBC # BLD AUTO: 4.18 10E6/UL (ref 3.8–5.2)
RBC MORPH BLD: ABNORMAL
TARGETS BLD QL SMEAR: SLIGHT
WBC # BLD AUTO: 11.2 10E3/UL (ref 4–11)

## 2025-02-05 PROCEDURE — 88304 TISSUE EXAM BY PATHOLOGIST: CPT | Mod: 26 | Performed by: PATHOLOGY

## 2025-02-05 PROCEDURE — 85018 HEMOGLOBIN: CPT | Performed by: STUDENT IN AN ORGANIZED HEALTH CARE EDUCATION/TRAINING PROGRAM

## 2025-02-05 PROCEDURE — 36415 COLL VENOUS BLD VENIPUNCTURE: CPT | Performed by: STUDENT IN AN ORGANIZED HEALTH CARE EDUCATION/TRAINING PROGRAM

## 2025-02-05 PROCEDURE — 85014 HEMATOCRIT: CPT | Performed by: STUDENT IN AN ORGANIZED HEALTH CARE EDUCATION/TRAINING PROGRAM

## 2025-02-05 PROCEDURE — 250N000013 HC RX MED GY IP 250 OP 250 PS 637: Performed by: FAMILY MEDICINE

## 2025-02-05 PROCEDURE — 84132 ASSAY OF SERUM POTASSIUM: CPT | Performed by: INTERNAL MEDICINE

## 2025-02-05 PROCEDURE — 99232 SBSQ HOSP IP/OBS MODERATE 35: CPT | Performed by: STUDENT IN AN ORGANIZED HEALTH CARE EDUCATION/TRAINING PROGRAM

## 2025-02-05 PROCEDURE — 250N000013 HC RX MED GY IP 250 OP 250 PS 637: Performed by: STUDENT IN AN ORGANIZED HEALTH CARE EDUCATION/TRAINING PROGRAM

## 2025-02-05 PROCEDURE — 250N000013 HC RX MED GY IP 250 OP 250 PS 637: Performed by: SURGERY

## 2025-02-05 PROCEDURE — 84132 ASSAY OF SERUM POTASSIUM: CPT | Performed by: STUDENT IN AN ORGANIZED HEALTH CARE EDUCATION/TRAINING PROGRAM

## 2025-02-05 PROCEDURE — 250N000011 HC RX IP 250 OP 636: Performed by: STUDENT IN AN ORGANIZED HEALTH CARE EDUCATION/TRAINING PROGRAM

## 2025-02-05 PROCEDURE — 120N000001 HC R&B MED SURG/OB

## 2025-02-05 PROCEDURE — 83735 ASSAY OF MAGNESIUM: CPT | Performed by: INTERNAL MEDICINE

## 2025-02-05 RX ORDER — POTASSIUM CHLORIDE 1500 MG/1
40 TABLET, EXTENDED RELEASE ORAL ONCE
Status: COMPLETED | OUTPATIENT
Start: 2025-02-05 | End: 2025-02-05

## 2025-02-05 RX ORDER — POTASSIUM CHLORIDE 1500 MG/1
20 TABLET, EXTENDED RELEASE ORAL ONCE
Status: COMPLETED | OUTPATIENT
Start: 2025-02-05 | End: 2025-02-05

## 2025-02-05 RX ADMIN — Medication 2 CAPSULE: at 00:31

## 2025-02-05 RX ADMIN — ACETAMINOPHEN 975 MG: 325 TABLET ORAL at 16:52

## 2025-02-05 RX ADMIN — IBUPROFEN 200 MG: 200 TABLET, FILM COATED ORAL at 20:12

## 2025-02-05 RX ADMIN — OXYCODONE HYDROCHLORIDE 5 MG: 5 TABLET ORAL at 20:12

## 2025-02-05 RX ADMIN — ACETAMINOPHEN 975 MG: 325 TABLET ORAL at 00:31

## 2025-02-05 RX ADMIN — POTASSIUM CHLORIDE 40 MEQ: 1500 TABLET, EXTENDED RELEASE ORAL at 08:20

## 2025-02-05 RX ADMIN — ACETAMINOPHEN 975 MG: 325 TABLET ORAL at 08:20

## 2025-02-05 RX ADMIN — IBUPROFEN 200 MG: 200 TABLET, FILM COATED ORAL at 12:03

## 2025-02-05 RX ADMIN — PIPERACILLIN AND TAZOBACTAM 3.38 G: 3; .375 INJECTION, POWDER, FOR SOLUTION INTRAVENOUS at 18:00

## 2025-02-05 RX ADMIN — PIPERACILLIN AND TAZOBACTAM 3.38 G: 3; .375 INJECTION, POWDER, FOR SOLUTION INTRAVENOUS at 08:19

## 2025-02-05 RX ADMIN — POTASSIUM CHLORIDE 20 MEQ: 1500 TABLET, EXTENDED RELEASE ORAL at 20:12

## 2025-02-05 RX ADMIN — PIPERACILLIN AND TAZOBACTAM 3.38 G: 3; .375 INJECTION, POWDER, FOR SOLUTION INTRAVENOUS at 00:31

## 2025-02-05 ASSESSMENT — ACTIVITIES OF DAILY LIVING (ADL)
ADLS_ACUITY_SCORE: 32

## 2025-02-05 NOTE — PROGRESS NOTES
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  2/3/2025 presented with 2-day history of right lower quadrant pain.  CT scan showed a perforated appendicitis with air and fluid levels.  No evidence of abscess.  Admitted.     S/p laparoscopic appendectomy w/ EBL of 20ccs. Overnight hypotensive SBP 85. Increasing MIVFs and monitor hgb carefully and continued on zosyn. Hgb slowly downtrending to 8.7 but pt remains vitally stable.    Still with some severe pain, appreciate surgery follow up. Continue iv abx for another 24-48 hrs. Stable otherwise. Plan for 2 weeks total of abx, would transition to augmentin on eventual discharge.    ----  S/p laparoscopic appendectomy  Perforated appendicitis  Acute blood loss anemia  hypOtension  A- as above. Follow pressures and anemia given delta -~3u. Some surgical blood loss but also hx of thalassemia. Monitor hgb carefully still downtrending but vitals stable.  P:  - monitor hgb q12 hrs and for signs/sx of bleed (add-on morning lab, and every 6a/p)  -transfuse if hgb <7  - workup further if dropping, ie ultrasound, scan  -Continue intravenous Zosyn q 8 hrs  -Dilaudid and Tylenol as needed, defer modification postop to surgery   - monitor for fevers and persistent hypotension, and if so, please page/inform MD/DO and would consider cultures  -also, pt has a hx of rash to zosyn but she tolerated a dose at 5:11pm on 2/3.     Leukocytosis  History of thalassemia  Initial white count 22.  Follow cbc w/ the acute blood loss anemia     History of Hurthle cell adenoma  History of thyroid lobectomy  Check TSH was wnl.     Vaginal atrophy  Hold on her estrogen cream at this time, keep hold, theoretical increased risk of  clot formation  Hold patient's phentermine (she has been taking this for weight loss), including postop     History of nephrolithiasis  Noted.     Code Status: Full Code     Disposition: Inpatient   Medically Ready for Discharge: Anticipated in 1-2 days   Medically Ready for Discharge: Anticipated Tomorrow           Diet: Full Liquid Diet    DVT Prophylaxis: post surgery ~3units hgb delta drop, BP SBP mid-80s, holding of on pharmaceutical unless surgery team would like with acute blood loss anemia; hgb still downtrending on 2/5  Byrnes Catheter: Not present  Lines: None     Cardiac Monitoring: None  Code Status: Full Code      Clinically Significant Risk Factors           # Hypocalcemia: Lowest Ca = 8.1 mg/dL in last 2 days, will monitor and replace as appropriate     # Hypoalbuminemia: Lowest albumin = 3.4 g/dL at 2/4/2025  1:58 AM, will monitor as appropriate                             Social Drivers of Health    Tobacco Use: Medium Risk (12/10/2024)    Patient History     Smoking Tobacco Use: Former     Smokeless Tobacco Use: Never     Passive Exposure: Never   Social Connections: Unknown (11/18/2024)    Social Connection and Isolation Panel [NHANES]     Frequency of Social Gatherings with Friends and Family: Once a week          Disposition Plan    Medically Ready for Discharge: Anticipated in 1-2 days            Timothy Trinidad MD  Hospitalist Service  [facility]  Securely message with [employer] (more info)  Text page via [employer] Paging/Directory   ______________________________________________________________________    Interval History   Naeo  Pt did not sleep much  Severe pain overnight  Discussed dispo with surgery team guidance  They recommend 24-48 hrs of ongoing iv abx  Discussed w/ rn and sw  Pt has no new symptoms or other questions     Physical Exam   Vital Signs: Temp: 98.7  F (37.1  C) Temp src: Oral BP: 121/67 Pulse: 76   Resp: 18 SpO2: 92 % O2 Device: None (Room air)    Weight:  120 lbs 0 oz    General: alert, oriented, and in no acute distress  Pulmonary: clear to auscultation bilaterally, normal respiratory effort, on room air, no rales or wheezes or evidence of accessory muscle use  CVS: regular rate and rhythm, no murmurs, rubs, or gallops; no blatant jugular venous distention; no extremity edema and extremities are warm to the touch  GI: soft, nontender, surgical dressing remains stable and dry and intact with some dried bleeding underneath dressing visible, but surrounding skin notable for an approximately 1/2 inch x 1 inch ecchymotic change left of dressing; monitoring that, no rebound or guarding, drain in place c/d/I  Neuro: nonfocal, moves all extremities of own volition  Psych: appropriate       Medical Decision Making       48 MINUTES SPENT BY ME on the date of service doing chart review, history, exam, documentation & further activities per the note.      Data   ------------------------- PAST 24 HR DATA REVIEWED -----------------------------------------------    I have personally reviewed the following data over the past 24 hrs:    11.2 (H)  \   8.7 (L); 8.7 (L)   / 161     N/A N/A N/A /  78   3.4 N/A N/A \       Imaging results reviewed over the past 24 hrs:   No results found for this or any previous visit (from the past 24 hours).

## 2025-02-05 NOTE — PROGRESS NOTES
General Surgery Progress Note:    Hospital Day # 2    ASSESSMENT:  1. Acute appendicitis with perforation and generalized peritonitis, without abscess or gangrene    2. Perforated appendicitis        Emilia Mccormick is a 59 year old female who is s/p laparoscopic appendectomy with abdominal washout and drain placed on 02/3/2025 d/t perforated acute appendicitis.      Patient is improving with her BP noting being as soft on POD#1.  Patient continues to tolerate PO intake of food with no nausea and vomiting.  Passing flatus and voiding with no significant difficulties.  Her WBC has improved to 11 now.  The drain is still a little cloudy which is expected given the findings during the surgery.       PLAN:  -Would recommend possible discharge in 24 to 48 hrs.    -ADAT  -IV abx per [facility].    -Drain to remain.  Given the characteristics of the drain finding, I may have the patient discharged with the drain to be removed in clinic.  Surgical team will continue to follow and monitor the drain output.  -Encourage movement/activity  -Continue medical mgmt per [facility]  -Surgery to follow    SUBJECTIVE:   Patient was seen at bedside this morning.  Patient states that she is doing better.  She had a better night of sleep.  Still some cramping around the lower abdomen.  Able to void and passing flatus.  Tolerating p.o. intake.  No chest pain no shortness of breath.    Patient Vitals for the past 24 hrs:   BP Temp Temp src Pulse Resp SpO2   02/05/25 0814 107/69 98.9  F (37.2  C) Oral 79 18 95 %   02/05/25 0500 121/67 98.7  F (37.1  C) Oral 76 18 92 %   02/04/25 2037 108/58 98.2  F (36.8  C) Oral -- 20 94 %   02/04/25 1528 98/58 -- Oral 64 20 96 %         PHYSICAL EXAM:  General: patient seen resting in bed, no acute distress  Resp: no respiratory distress, breathing comfortably on room air.   Abdomen: Abdomen soft, minimally distended.  Appropriate tenderness to palpation.  Surgical incisions clean dry and intact.  Patient has  abdominal drain in position with serosanguineous output.  There is a slight cloudiness to the drain output.      Rajan Ospina DO  General Surgeon  [facility]  Surgery [facility] - [address] [address]?  Office: [phone]  Employed by - [employer]  Pager: [phone]

## 2025-02-05 NOTE — PLAN OF CARE
Goal: Absence of Hospital-Acquired Illness or Injury  Outcome: Progressing  Intervention: Identify and Manage Fall Risk  Recent Flowsheet Documentation  Taken 2/4/2025 2000 by Annie Steward RN  Safety Promotion/Fall Prevention: safety round/check completed  Intervention: Prevent and Manage VTE (Venous Thromboembolism) Risk  Recent Flowsheet Documentation  Taken 2/4/2025 2000 by Annie Steward RN  VTE Prevention/Management: SCDs on (sequential compression devices)  Goal: Optimal Comfort and Wellbeing  Outcome: Progressing  Goal: Readiness for Transition of Care  Outcome: Progressing     Problem: Appendectomy  Goal: Absence of Bleeding  Outcome: Progressing  Goal: Effective Bowel Elimination  Outcome: Progressing  Goal: Fluid and Electrolyte Balance  Outcome: Progressing  Goal: Absence of Infection Signs and Symptoms  Outcome: Progressing  Goal: Anesthesia/Sedation Recovery  Outcome: Progressing  Intervention: Optimize Anesthesia Recovery  Recent Flowsheet Documentation  Taken 2/4/2025 2000 by Annie Steward RN  Safety Promotion/Fall Prevention: safety round/check completed  Goal: Acceptable Pain Control  Outcome: Progressing  Goal: Nausea and Vomiting Relief  Outcome: Progressing  Goal: Effective Urinary Elimination  Outcome: Progressing  Goal: Effective Oxygenation and Ventilation  Outcome: Progressing   Goal Outcome Evaluation:       Patient VSS. Patient did have an episode of intense abdominal pain that patient described as intermittent cramping last evening. Patient rated this pain at 9/10. Patient was given x1 dose of IV dilaudid at that time with stated relief. After this brief episode of pain, patient felt much better and slept well throughout the night. Patient also receiving tylenol and ibuprofen for pain control. Bowel sounds hyperactive and audible. Patient passing gas. Bps were WNL. Zosyn given per order. NS running at 75 ml/hr. Patient up SBA, voiding adequately. Care and education  on-going.  Annie Steward RN

## 2025-02-05 NOTE — PLAN OF CARE
Day RN ([id number])    Pt A/O x4.  VSS and afebrile.  Pain managed adequately with scheduled PO tylenol and prn PO ibuprofen - pt aware that oxycodone available if she would like it - she has thus far declined.  Ice application to site.  CMS intact.  Dressing CDI.  JUSTINE drain x1, 10ml output this shift.  Up SBA.  Voiding adequately.  Tolerating regular diet well.  Plan is home on discharge once medically stable, likely tomorrow.  IV zosyn as antibiotic.  Hemoglobin recheck was 9.5.  Potassium replacement given as per protocol, redraw was 3.7.  Magnesium wdl.  Will continue to monitor.

## 2025-02-06 VITALS
HEART RATE: 79 BPM | OXYGEN SATURATION: 95 % | HEIGHT: 60 IN | TEMPERATURE: 98.1 F | RESPIRATION RATE: 18 BRPM | BODY MASS INDEX: 23.56 KG/M2 | WEIGHT: 120 LBS | DIASTOLIC BLOOD PRESSURE: 68 MMHG | SYSTOLIC BLOOD PRESSURE: 107 MMHG

## 2025-02-06 LAB
ANION GAP SERPL CALCULATED.3IONS-SCNC: 9 MMOL/L (ref 7–15)
BASOPHILS # BLD AUTO: 0 10E3/UL (ref 0–0.2)
BASOPHILS NFR BLD AUTO: 0 %
BUN SERPL-MCNC: 7.9 MG/DL (ref 8–23)
CALCIUM SERPL-MCNC: 8.2 MG/DL (ref 8.8–10.4)
CHLORIDE SERPL-SCNC: 111 MMOL/L (ref 98–107)
CREAT SERPL-MCNC: 0.61 MG/DL (ref 0.51–0.95)
EGFRCR SERPLBLD CKD-EPI 2021: >90 ML/MIN/1.73M2
EOSINOPHIL # BLD AUTO: 0.1 10E3/UL (ref 0–0.7)
EOSINOPHIL NFR BLD AUTO: 1 %
ERYTHROCYTE [DISTWIDTH] IN BLOOD BY AUTOMATED COUNT: 15.6 % (ref 10–15)
GLUCOSE SERPL-MCNC: 88 MG/DL (ref 70–99)
HCO3 SERPL-SCNC: 21 MMOL/L (ref 22–29)
HCT VFR BLD AUTO: 27.2 % (ref 35–47)
HGB BLD-MCNC: 8.6 G/DL (ref 11.7–15.7)
HGB BLD-MCNC: 8.7 G/DL (ref 11.7–15.7)
HGB BLD-MCNC: 9.1 G/DL (ref 11.7–15.7)
IMM GRANULOCYTES # BLD: 0.1 10E3/UL
IMM GRANULOCYTES NFR BLD: 1 %
LYMPHOCYTES # BLD AUTO: 1.7 10E3/UL (ref 0.8–5.3)
LYMPHOCYTES NFR BLD AUTO: 16 %
MAGNESIUM SERPL-MCNC: 2 MG/DL (ref 1.7–2.3)
MCH RBC QN AUTO: 20.7 PG (ref 26.5–33)
MCHC RBC AUTO-ENTMCNC: 32 G/DL (ref 31.5–36.5)
MCV RBC AUTO: 65 FL (ref 78–100)
MONOCYTES # BLD AUTO: 0.9 10E3/UL (ref 0–1.3)
MONOCYTES NFR BLD AUTO: 9 %
NEUTROPHILS # BLD AUTO: 7.6 10E3/UL (ref 1.6–8.3)
NEUTROPHILS NFR BLD AUTO: 74 %
NRBC # BLD AUTO: 0 10E3/UL
NRBC BLD AUTO-RTO: 0 /100
PLATELET # BLD AUTO: 208 10E3/UL (ref 150–450)
PLATELET # BLD AUTO: 210 10E3/UL (ref 150–450)
POTASSIUM SERPL-SCNC: 3.5 MMOL/L (ref 3.4–5.3)
RBC # BLD AUTO: 4.21 10E6/UL (ref 3.8–5.2)
SODIUM SERPL-SCNC: 141 MMOL/L (ref 135–145)
WBC # BLD AUTO: 10.3 10E3/UL (ref 4–11)

## 2025-02-06 PROCEDURE — 85025 COMPLETE CBC W/AUTO DIFF WBC: CPT | Performed by: SURGERY

## 2025-02-06 PROCEDURE — 36415 COLL VENOUS BLD VENIPUNCTURE: CPT

## 2025-02-06 PROCEDURE — 250N000011 HC RX IP 250 OP 636: Performed by: STUDENT IN AN ORGANIZED HEALTH CARE EDUCATION/TRAINING PROGRAM

## 2025-02-06 PROCEDURE — 85049 AUTOMATED PLATELET COUNT: CPT

## 2025-02-06 PROCEDURE — 85018 HEMOGLOBIN: CPT | Performed by: STUDENT IN AN ORGANIZED HEALTH CARE EDUCATION/TRAINING PROGRAM

## 2025-02-06 PROCEDURE — 83735 ASSAY OF MAGNESIUM: CPT | Performed by: STUDENT IN AN ORGANIZED HEALTH CARE EDUCATION/TRAINING PROGRAM

## 2025-02-06 PROCEDURE — 99239 HOSP IP/OBS DSCHRG MGMT >30: CPT | Performed by: STUDENT IN AN ORGANIZED HEALTH CARE EDUCATION/TRAINING PROGRAM

## 2025-02-06 PROCEDURE — 250N000013 HC RX MED GY IP 250 OP 250 PS 637: Performed by: STUDENT IN AN ORGANIZED HEALTH CARE EDUCATION/TRAINING PROGRAM

## 2025-02-06 PROCEDURE — 80048 BASIC METABOLIC PNL TOTAL CA: CPT | Performed by: SURGERY

## 2025-02-06 PROCEDURE — 250N000013 HC RX MED GY IP 250 OP 250 PS 637: Performed by: SURGERY

## 2025-02-06 PROCEDURE — 36415 COLL VENOUS BLD VENIPUNCTURE: CPT | Performed by: STUDENT IN AN ORGANIZED HEALTH CARE EDUCATION/TRAINING PROGRAM

## 2025-02-06 PROCEDURE — 85049 AUTOMATED PLATELET COUNT: CPT | Performed by: SURGERY

## 2025-02-06 PROCEDURE — 85018 HEMOGLOBIN: CPT

## 2025-02-06 PROCEDURE — 82310 ASSAY OF CALCIUM: CPT | Performed by: SURGERY

## 2025-02-06 RX ORDER — MAGNESIUM OXIDE 400 MG/1
400 TABLET ORAL EVERY 4 HOURS
Status: COMPLETED | OUTPATIENT
Start: 2025-02-06 | End: 2025-02-06

## 2025-02-06 RX ORDER — DOCUSATE SODIUM 100 MG/1
100 CAPSULE, LIQUID FILLED ORAL 2 TIMES DAILY
Qty: 30 CAPSULE | Refills: 0 | Status: SHIPPED | OUTPATIENT
Start: 2025-02-06

## 2025-02-06 RX ORDER — OXYCODONE AND ACETAMINOPHEN 5; 325 MG/1; MG/1
1 TABLET ORAL EVERY 6 HOURS PRN
Qty: 12 TABLET | Refills: 0 | Status: SHIPPED | OUTPATIENT
Start: 2025-02-06 | End: 2025-02-09

## 2025-02-06 RX ORDER — POTASSIUM CHLORIDE 1500 MG/1
20 TABLET, EXTENDED RELEASE ORAL ONCE
Status: COMPLETED | OUTPATIENT
Start: 2025-02-06 | End: 2025-02-06

## 2025-02-06 RX ADMIN — Medication 400 MG: at 07:53

## 2025-02-06 RX ADMIN — ACETAMINOPHEN 975 MG: 325 TABLET ORAL at 07:53

## 2025-02-06 RX ADMIN — Medication 400 MG: at 12:39

## 2025-02-06 RX ADMIN — PIPERACILLIN AND TAZOBACTAM 3.38 G: 3; .375 INJECTION, POWDER, FOR SOLUTION INTRAVENOUS at 01:14

## 2025-02-06 RX ADMIN — PIPERACILLIN AND TAZOBACTAM 3.38 G: 3; .375 INJECTION, POWDER, FOR SOLUTION INTRAVENOUS at 09:36

## 2025-02-06 RX ADMIN — POTASSIUM CHLORIDE 20 MEQ: 1500 TABLET, EXTENDED RELEASE ORAL at 07:54

## 2025-02-06 RX ADMIN — ACETAMINOPHEN 975 MG: 325 TABLET ORAL at 01:13

## 2025-02-06 RX ADMIN — IBUPROFEN 200 MG: 200 TABLET, FILM COATED ORAL at 12:39

## 2025-02-06 RX ADMIN — IBUPROFEN 200 MG: 200 TABLET, FILM COATED ORAL at 05:46

## 2025-02-06 ASSESSMENT — ACTIVITIES OF DAILY LIVING (ADL)
ADLS_ACUITY_SCORE: 32

## 2025-02-06 NOTE — DISCHARGE INSTRUCTIONS
Follow up: Please call us at [phone] to schedule an appointment at your convenience.      If you would prefer to follow up with us by phone please let us know so that we may contact you 2-3 weeks following your procedure.         Diet: Regular diet.  Foods high infiber are recommended with 8 to 10 glasses of fluids each day.     Patients can have difficulty with constipation following surgery, due in part to the administration of narcotic medications.  If you are suffering with constipation, you should avoid foods such as hard cheeses or red meat.      Activity: You should continue to be active at home, including ambulating frequently.  If possible try to limit the amount of time spent in bed.     Restrictions: Daily activity as tolerated.  No lifting more than 10 pounds until cleared by general surgery team.     Wound / drain care: Your incisions are closed using absorbale sutures.  The skin is sealed with a surgical glue.  Do not peal the glue off.  Please allow the glue to peal off on its own.      It is normal to have a small rim of red present around the incisions. This should not, however, extend beyond 1/4 inch from the incision.  If your incisions become increasingly tender, red, or draining, please contact us.        Abdominal drain will be removed in clinic.    Bathing: You are allowed to shower with the abdominal drain in position.  Do not bathe or use hot tubs until cleared by surgical team.

## 2025-02-06 NOTE — PROGRESS NOTES
General Surgery Progress Note:    Hospital Day # 3    ASSESSMENT:  1. Acute appendicitis with perforation and generalized peritonitis, without abscess or gangrene    2. Perforated appendicitis        Emilia Mccormick is a 59 year old female who is s/p laparoscopic appendectomy with abdominal washout and drain placed on 02/3/2025 d/t perforated acute appendicitis.  Patient continues to improve from surgery as expected.      Anemia fluctuating between 8 and 9.  I suspect that the anemia is as expected from surgery as well as from the dilutional effect from IV fluids.  The abdominal drain does not show any significant concerns for intra-abdominal bleeding.  Patient's vital signs remained stable.    Pathology shows gangrenous appendicitis.  I agree with the findings of gangrenous appendicitis.    PLAN:  -From the general surgery standpoint, patient can be discharged when she is medically stable  -Regular diet  -Given that the patient is improving well with decreasing leukocytosis, I will give her oral antibiotics of Augmentin to complete a total of 10-day course of antibiotics.   -Patient is to be discharged with abdominal drain.  This will be removed in clinic  -Encourage movement/activity  -[place] [facility]    SUBJECTIVE:   Patient was seen at bedside this morning.  The patient is doing better.  Patient is optimistic about being discharged today if cleared.  Otherwise, she has no new complaints.    Patient Vitals for the past 24 hrs:   BP Temp Temp src Pulse Resp SpO2   02/06/25 0751 107/68 98.1  F (36.7  C) Oral 79 18 95 %   02/06/25 0102 109/62 98.9  F (37.2  C) Oral -- 20 93 %   02/05/25 2016 -- -- -- -- -- 95 %   02/05/25 1700 119/72 98.5  F (36.9  C) Oral 88 22 94 %         PHYSICAL EXAM:  General: patient seen resting in bed, no acute distress  Resp: no respiratory distress, breathing comfortably on room air.   Abdomen: Abdomen soft, nondistended.  Appropriate tenderness to palpation.  Surgical  incisions clean dry and intact.  Abdominal drain is starting to improve with less cloudiness.  Still with serosanguineous drainage.    Rajan Ospina DO  General Surgeon  [facility]  Surgery [facility] - [address] [address]?  Office: [phone]  Employed by - [facility] [place] [employer]  Pager: [phone]

## 2025-02-06 NOTE — PLAN OF CARE
Day RN ([id number])    Pt discharged home from hospital at around 1335.  Reviewed discharge instructions with patient. Questions answered.  Had pt practice emptying JUSTINE drain and measuring; provided with supplies for home - pt verbalized understanding of plan for drain, comfort caring for it at home, and successfully demonstrated the skill. Patient discharged to home with ride from family, plan for how to  escribed home discharge meds (augmentin, colace, and percocet), discharge instructions, and belongings.  Pt eager and adequate for discharge.    Pt A/O x4.  VSS and afebrile.  Pain managed adequately with scheduled PO tylenol and PRN PO ibuprofen.  CMS intact.  Dressing CDI.  JUSTINE drain wdl w/ 10ml output this shift.  Up independently.  Voiding adequately.  Tolerating regular diet well.  IV zosyn as antibiotic.  Plan is home on discharge once medically stable.  Will continue to monitor.

## 2025-02-06 NOTE — DISCHARGE SUMMARY
[facility]  Hospitalist Discharge Summary      Date of Admission:  2/3/2025  Date of Discharge:  2/6/2025  Discharging Provider: Timothy Trinidad MD  Discharge Service: Hospitalist Service    Discharge Diagnoses   Acute appendicitis with perforation and generalized peritonitis, without abscess    Pathology shows gangrenous appendicitis   S/p laparoscopic appendectomy   Acute blood loss anemia, stable   hypOtension, improved  Leukocytosis, downtrending  History of thalassemia  History of Hurthle cell adenoma  History of thyroid lobectomy  Vaginal atrophy  History of nephrolithiasis     Clinically Significant Risk Factors          Follow-ups Needed After Discharge   Follow-up Appointments       Follow-up and recommended labs and tests       None        Follow-up and recommended labs and tests       Follow up with primary care provider, Shantelle Ornelas, within 7 days for hospital follow- up.  The following labs/tests are recommended: cbc and ensure general surgery follow-up to remove JUSTINE drain.                 Unresulted Labs Ordered in the Past 30 Days of this Admission       No orders found from 1/4/2025 to 2/4/2025.        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  2/3/2025 presented with 2-day history of right lower quadrant pain.  CT scan showed a perforated appendicitis with air and fluid levels.  No evidence of abscess.  Admitted.     S/p laparoscopic appendectomy w/ EBL of 20ccs. Overnight hypotensive SBP 85. Increasing MIVFs and monitor hgb carefully and continued on zosyn. Hgb slowly downtrending to 8.7 but pt remains vitally stable.    Pathology shows gangrenous appendicitis.  On 2/6 she is clinically improved  and pain was tolerable and she was tolerating regular diet.  As she is clinically improving and cleared by surgery for discharge, she will be discharged back home with 7 more days of Augmentin to complete a total of 10 days of therapy as recommended by surgery team (zosyn while here and on 2/6).  She was instructed to follow with the primary care provider within a week and also with surgery and at that appointment they plan to remove the drain.  She was instructed on symptoms monitor for.  Pain medications are as per surgery which is Percocet (on top of conservative tylenol).  Hemoglobin was downtrending but stable upon rechecking.    Consultations This Hospital Stay   SURGERY GENERAL IP CONSULT    Code Status   Full Code    Time Spent on this Encounter   I, Timothy Trinidad MD, personally saw the patient today and spent greater than 30 minutes discharging this patient.       Timothy Trinidad MD  [address] [address]  Phone: [phone]  Fax: [phone]  ______________________________________________________________________    Physical Exam   Vital Signs: Temp: 98.1  F (36.7  C) Temp src: Oral BP: 107/68 Pulse: 79   Resp: 18 SpO2: 95 % O2 Device: None (Room air)    Weight: 120 lbs 0 oz     General: alert, oriented, and in no acute distress  Pulmonary: clear to auscultation bilaterally, normal respiratory effort, on room air, no rales or wheezes or evidence of accessory muscle use  CVS: regular rate and rhythm, no murmurs, rubs, or gallops; no blatant jugular venous distention; no extremity edema and extremities are warm to the touch  GI: soft, nontender, surgical dressing remains stable and dry and intact with some unchanged previously noted dried bleeding underneath but dressing stable, surrounding skin notable for an approximately 1/2 inch x 1 inch ecchymotic change left of dressing and another superior to dressing smaller size; informed pt  to monitor; no rebound or guarding, drain in place c/d/I  Neuro: nonfocal, moves all extremities of own volition  Psych: appropriate       Primary Care Physician   Shantelle Ornelas    Discharge Orders      Reason for your hospital stay    Status post laparoscopic appendectomy, sepsis, intra-abdominal abscess.     Follow-up and recommended labs and tests     None     Activity    Your activity upon discharge: Daily activity as tolerated.  No lifting more than 10 pounds until cleared by general surgery team.     Reason for your hospital stay    Acute appendicitis with perforation, as per surgery team recommendations, complete 7 more days of Augmentin to complete a total of 10 days of antibiotics.  Follow-up with your primary care provider within a week and also with surgery; plan to remove your drain at that time. Pain medications as per surgery (percocet).     Follow-up and recommended labs and tests     Follow up with primary care provider, Shantelle Ornelas, within 7 days for hospital follow- up.  The following labs/tests are recommended: cbc and ensure general surgery follow-up to remove JUSTINE drain.     Activity    Your activity upon discharge: activity as tolerated     Diet    Follow this diet upon discharge: Regular     Diet    Follow this diet upon discharge: Current Diet:Orders Placed This Encounter      Regular Diet Adult      Diet       Significant Results and Procedures   Results for orders placed or performed during the hospital encounter of 02/03/25   CT Abdomen Pelvis w Contrast     Value    Radiologist flags Perforated appendicitis (AA)    Narrative    EXAM: CT ABDOMEN PELVIS W CONTRAST  LOCATION: [facility]  DATE: 2/3/2025    INDICATION: pain RLQ, appendicitis?  COMPARISON: CT abdomen pelvis without contrast 6/8/2015  TECHNIQUE: CT scan of the abdomen and pelvis was performed following injection of IV contrast. Multiplanar reformats were obtained. Dose reduction techniques were  used.  CONTRAST: 90ml Isovue 370    FINDINGS:   LOWER CHEST: Normal.    HEPATOBILIARY: Diffuse hepatic steatosis. No significant mass. No bile duct dilatation. No calcified gallstones.    PANCREAS: Normal.    SPLEEN: Normal.    ADRENAL GLANDS: Normal.    KIDNEYS/BLADDER: No significant mass, stones, or hydronephrosis. There are simple or benign cysts. No follow up is needed.    BOWEL: The appendix is enlarged and has mucosal hyperenhancement. There is an appendicolith at the base of the appendix. Periappendiceal inflammatory stranding is present which extends to the base of the cecum. Tiny foci of free air are present in the   right lower quadrant (series 3, images 119-122). Free fluid layers into the pelvis. There is peritoneal thickening anterior and posterior in the right lower quadrant. No focal intra-abdominal fluid collection. No dilated large or small bowel.    LYMPH NODES: Mildly enlarged, reactive lymph nodes are present in the mesentery to the right lower quadrant.    VASCULATURE: Patchy, mild to moderate atheromatous calcifications are present in the abdominal aorta and iliac arteries. No aneurysm or critical stenosis.    PELVIC ORGANS: Normal.    MUSCULOSKELETAL: Low thoracic and lumbar vertebra are maintained in height and shape.      Impression    IMPRESSION:     Acute appendicitis complicated by appendix perforation with small foci of free air, peritoneal thickening and layering fluid in the pelvis. No organized intra-abdominal fluid collection to suggest abscess.      [Critical Result: Perforated appendicitis]    Finding was identified on 2/3/2025 4:19 PM CST.     Merline LIND in [facility] was contacted by me on 2/3/2025 4:25 PM CST and verbalized understanding of the critical result.       Discharge Medications   Current Discharge Medication List        START taking these medications    Details   amoxicillin-clavulanate (AUGMENTIN) 875-125 MG tablet Take 1 tablet by mouth 2 times daily.  Qty:  14 tablet, Refills: 0    Associated Diagnoses: Acute appendicitis with perforation and generalized peritonitis, without abscess or gangrene      docusate sodium (COLACE) 100 MG capsule Take 1 capsule (100 mg) by mouth 2 times daily.  Qty: 30 capsule, Refills: 0    Associated Diagnoses: Acute appendicitis with perforation and generalized peritonitis, without abscess or gangrene      oxyCODONE-acetaminophen (PERCOCET) 5-325 MG tablet Take 1 tablet by mouth every 6 hours as needed for pain.  Qty: 12 tablet, Refills: 0    Associated Diagnoses: Acute appendicitis with perforation and generalized peritonitis, without abscess or gangrene           CONTINUE these medications which have NOT CHANGED    Details   Calcium Carb-Magnesium Carb 250-300 MG TABS Take 1 tablet by mouth 2 times daily.      estradiol (ESTRACE) 0.1 MG/GM vaginal cream Place 1 g vaginally three times a week.  Qty: 42.5 g, Refills: 2    Associated Diagnoses: Vaginal atrophy      multivitamin (ONE A DAY) per tablet [MULTIVITAMIN (ONE A DAY) PER TABLET] Take 1 tablet by mouth daily.      phentermine (ADIPEX-P) 37.5 MG tablet Take 0.5-1 tablets (18.75-37.5 mg) by mouth every morning (before breakfast).  Qty: 90 tablet, Refills: 1    Associated Diagnoses: Hyperphagia      psyllium (METAMUCIL/KONSYL) capsule Take 2-3 capsules by mouth at bedtime.      Vitamin D3 (CHOLECALCIFEROL) 125 MCG (5000 UT) tablet Take 1 tablet by mouth daily.           Allergies   Allergies   Allergen Reactions    Escitalopram Unknown     Other Reaction(s): Weight gain/tired    Penicillin V      Other Reaction(s): Itching    Penicillins Unknown

## 2025-02-06 NOTE — PLAN OF CARE
Goal Outcome Evaluation:             VSS. Up with SBA for assisting with cords and lines. Able to move independently.  Pain controlled with ibuprofen, tylenol and 1x oxycodone. JUSTINE draining serosanguineous output. Hemoglobin 8.6.  .             Problem: Adult Inpatient Plan of Care  Goal: Optimal Comfort and Wellbeing  Outcome: Progressing     Problem: Appendectomy  Goal: Acceptable Pain Control  Outcome: Progressing  Goal: Nausea and Vomiting Relief  Outcome: Progressing

## 2025-02-11 ENCOUNTER — OFFICE VISIT (OUTPATIENT)
Dept: SURGERY | Facility: CLINIC | Age: 60
End: 2025-02-11
Payer: COMMERCIAL

## 2025-02-11 DIAGNOSIS — Z98.890 POSTOPERATIVE STATE: Primary | ICD-10-CM

## 2025-02-11 PROCEDURE — 99024 POSTOP FOLLOW-UP VISIT: CPT | Performed by: SURGERY

## 2025-02-11 NOTE — PROGRESS NOTES
General Surgery H&P  Emilia Mccormick MRN# [id number]   Age/Sex: 59 year old female YOB: 1965     Reason for visit: Acute appendicitis       Referring physician: Self                   Assessment and Plan:   Assessment:  1.  Perforated appendix    59-year-old female status post laparoscopic appendectomy on 2/3/2025 for a perforated appendix.  Patient is recovering uneventfully from surgery.  Minimal output from the drain.    Plan:  -Abdominal drain removed  -Patient can return to work as tolerated  -Follow-up as needed with general surgery team          Chief Complaint:   Postop check     History is obtained from the patient    HPI:   Emilia Mccormick is a 59 year old female who presents general surgery team today for evaluation regarding abdominal drain and status post surgery.  Patient is status post laparoscopic appendectomy on 2/3/2025 for perforated appendix.  Patient is tolerating diet.  No fevers no chills.  Minimal abdominal pain.  Passing flatus and bowel once.          Past Medical History:     Past Medical History:   Diagnosis Date    Arthritis [id number]    Kidney stone     Osteoporosis     Thalassemia     Thyroid disease 1/1/20000    Reslolved, thyroid lobectomy in 2000              Past Surgical History:     Past Surgical History:   Procedure Laterality Date    COLONOSCOPY  2013    COLONOSCOPY N/A 1/31/2024    Procedure: COLONOSCOPY, WITH POLYPECTOMY AND BIOPSY;  Surgeon: Daniel Hu DO;  Location: [facility]    ENDOMETRIAL ABLATION      HEAD & NECK SURGERY  2000    Thyroid Lobectomy (Huerthle cell adenoma)    LAPAROSCOPIC APPENDECTOMY N/A 2/3/2025    Procedure: APPENDECTOMY, LAPAROSCOPIC;  Surgeon: Rajan Ospina DO;  Location: [facility]    THYROID SURGERY  01/01/2000    lobectomy    TUBAL LIGATION      URETEROSCOPY               Social History:    reports that she has quit smoking. Her smoking use included cigarettes. She has never been exposed to tobacco smoke.  She has never used smokeless tobacco. She reports current alcohol use. She reports that she does not currently use drugs after having used the following drugs: Marijuana.           Family History:     Family History   Problem Relation Age of Onset    Other - See Comments Mother         unknown bio mothers's History    Cancer Father         skin    Heart Disease Father     Coronary Artery Disease Father     Substance Abuse Father         Alcoholic, in recovery for long time    Osteoporosis Father     Chronic Obstructive Pulmonary Disease Father         SMOKING    Osteoporosis Paternal Grandmother     Cancer Paternal Grandfather         lung - Smoking    Breast Cancer Paternal Aunt 70    Urolithiasis No family hx of     Clotting Disorder No family hx of     Diabetes No family hx of     Gout No family hx of               Allergies:     Allergies   Allergen Reactions    Escitalopram Unknown     Other Reaction(s): Weight gain/tired    Penicillin V      Other Reaction(s): Itching    Penicillins Unknown              Medications:     Prior to Admission medications    Medication Sig Start Date End Date Taking? Authorizing Provider   amoxicillin-clavulanate (AUGMENTIN) 875-125 MG tablet Take 1 tablet by mouth 2 times daily. 2/6/25   Brigida Edgar PA   Calcium Carb-Magnesium Carb 250-300 MG TABS Take 1 tablet by mouth 2 times daily.    Reported, Patient   docusate sodium (COLACE) 100 MG capsule Take 1 capsule (100 mg) by mouth 2 times daily. 2/6/25   Rajan Ospina,    estradiol (ESTRACE) 0.1 MG/GM vaginal cream Place 1 g vaginally three times a week. 11/20/24   Shantelle Ornelas APRN CNP   multivitamin (ONE A DAY) per tablet [MULTIVITAMIN (ONE A DAY) PER TABLET] Take 1 tablet by mouth daily. 6/8/15   Provider, Historical   phentermine (ADIPEX-P) 37.5 MG tablet Take 0.5-1 tablets (18.75-37.5 mg) by mouth every morning (before breakfast). 9/10/24 3/9/25  Vicki Hernández MD   psyllium (METAMUCIL/KONSYL) capsule  Take 2-3 capsules by mouth at bedtime.    Unknown, Entered By History   Vitamin D3 (CHOLECALCIFEROL) 125 MCG (5000 UT) tablet Take 1 tablet by mouth daily.    Unknown, Entered By History              Review of Systems:   A 12 point Review of Systems is negative other than noted in the HPI            Physical Exam:   No data found.     [unfilled]   Constitutional:   awake, alert, cooperative, no apparent distress, and appears stated age       Eyes:   PERRL, conjunctiva/corneas clear, EOM's intact; no scleral edema or icterus noted        ENT:   Normocephalic, without obvious abnormality, atraumatic, Lips, mucosa, and tongue normal        Hematologic / Lymphatic:   No lymphadenopathy       Lungs:   Normal respiratory effort, no accessory muscle use       Cardiovascular:   Regular rate and rhythm       Abdomen:   Soft, nondistended, nontender to palpation.  Surgical incisions healed.  Patient has some mild ecchymosis around the infraumbilical surgical site.  Abdominal drain is serous output.       Musculoskeletal:   No obvious swelling, bruising or deformity       Skin:   Skin color and texture normal for patient, no rashes or lesions              Data:              DO Rajan Rachel DO  General Surgeon  [facility]  Surgery [address] [address]?  Office: [phone]  Employed by - [employer]  Pager: [phone]

## 2025-02-11 NOTE — LETTER
2/11/2025      Emilia cMcormick  [address] [place] [address]      Dear Colleague,    Thank you for referring your patient, Emilia Mccormick, to the [facility] [place]. Please see a copy of my visit note below.    General Surgery H&P  Emilia Mccormick MRN# [id number]   Age/Sex: 59 year old female YOB: 1965     Reason for visit: Acute appendicitis       Referring physician: Self                   Assessment and Plan:   Assessment:  1.  Perforated appendix    59-year-old female status post laparoscopic appendectomy on 2/3/2025 for a perforated appendix.  Patient is recovering uneventfully from surgery.  Minimal output from the drain.    Plan:  -Abdominal drain removed  -Patient can return to work as tolerated  -Follow-up as needed with general surgery team          Chief Complaint:   Postop check     History is obtained from the patient    HPI:   Emilia Mccormick is a 59 year old female who presents general surgery team today for evaluation regarding abdominal drain and status post surgery.  Patient is status post laparoscopic appendectomy on 2/3/2025 for perforated appendix.  Patient is tolerating diet.  No fevers no chills.  Minimal abdominal pain.  Passing flatus and bowel once.          Past Medical History:     Past Medical History:   Diagnosis Date     Arthritis [id number]     Kidney stone      Osteoporosis      Thalassemia      Thyroid disease 1/1/20000    Reslolved, thyroid lobectomy in 2000              Past Surgical History:     Past Surgical History:   Procedure Laterality Date     COLONOSCOPY  2013     COLONOSCOPY N/A 1/31/2024    Procedure: COLONOSCOPY, WITH POLYPECTOMY AND BIOPSY;  Surgeon: Daniel Hu DO;  Location: [facility]     ENDOMETRIAL ABLATION       HEAD & NECK SURGERY  2000    Thyroid Lobectomy (Huerthle cell adenoma)     LAPAROSCOPIC APPENDECTOMY N/A 2/3/2025    Procedure: APPENDECTOMY, LAPAROSCOPIC;   Surgeon: Rajan Ospina DO;  Location: [facility] Main OR     THYROID SURGERY  01/01/2000    lobectomy     TUBAL LIGATION       URETEROSCOPY               Social History:    reports that she has quit smoking. Her smoking use included cigarettes. She has never been exposed to tobacco smoke. She has never used smokeless tobacco. She reports current alcohol use. She reports that she does not currently use drugs after having used the following drugs: Marijuana.           Family History:     Family History   Problem Relation Age of Onset     Other - See Comments Mother         unknown bio mothers's History     Cancer Father         skin     Heart Disease Father      Coronary Artery Disease Father      Substance Abuse Father         Alcoholic, in recovery for long time     Osteoporosis Father      Chronic Obstructive Pulmonary Disease Father         SMOKING     Osteoporosis Paternal Grandmother      Cancer Paternal Grandfather         lung - Smoking     Breast Cancer Paternal Aunt 70     Urolithiasis No family hx of      Clotting Disorder No family hx of      Diabetes No family hx of      Gout No family hx of               Allergies:     Allergies   Allergen Reactions     Escitalopram Unknown     Other Reaction(s): Weight gain/tired     Penicillin V      Other Reaction(s): Itching     Penicillins Unknown              Medications:     Prior to Admission medications    Medication Sig Start Date End Date Taking? Authorizing Provider   amoxicillin-clavulanate (AUGMENTIN) 875-125 MG tablet Take 1 tablet by mouth 2 times daily. 2/6/25   Brigida Edgar PA   Calcium Carb-Magnesium Carb 250-300 MG TABS Take 1 tablet by mouth 2 times daily.    Reported, Patient   docusate sodium (COLACE) 100 MG capsule Take 1 capsule (100 mg) by mouth 2 times daily. 2/6/25   Rajan Ospina DO   estradiol (ESTRACE) 0.1 MG/GM vaginal cream Place 1 g vaginally three times a week. 11/20/24   Shantelle Ornelas APRN CNP   multivitamin (ONE A DAY) per  tablet [MULTIVITAMIN (ONE A DAY) PER TABLET] Take 1 tablet by mouth daily. 6/8/15   Provider, Historical   phentermine (ADIPEX-P) 37.5 MG tablet Take 0.5-1 tablets (18.75-37.5 mg) by mouth every morning (before breakfast). 9/10/24 3/9/25  [facility]   psyllium (METAMUCIL/KONSYL) capsule Take 2-3 capsules by mouth at bedtime.    Unknown, Entered By History   Vitamin D3 (CHOLECALCIFEROL) 125 MCG (5000 UT) tablet Take 1 tablet by mouth daily.    Unknown, Entered By History              Review of Systems:   A 12 point Review of Systems is negative other than noted in the HPI            Physical Exam:   No data found.     [unfilled]   Constitutional:   awake, alert, cooperative, no apparent distress, and appears stated age       Eyes:   PERRL, conjunctiva/corneas clear, EOM's intact; no scleral edema or icterus noted        ENT:   Normocephalic, without obvious abnormality, atraumatic, Lips, mucosa, and tongue normal        Hematologic / Lymphatic:   No lymphadenopathy       Lungs:   Normal respiratory effort, no accessory muscle use       Cardiovascular:   Regular rate and rhythm       Abdomen:   Soft, nondistended, nontender to palpation.  Surgical incisions healed.  Patient has some mild ecchymosis around the infraumbilical surgical site.  Abdominal drain is serous output.       Musculoskeletal:   No obvious swelling, bruising or deformity       Skin:   Skin color and texture normal for patient, no rashes or lesions              Data:              DO Rajan Rachel DO  General Surgeon  [facility]  Surgery [address] [address]?  Office: [phone]  Employed by - [facility] [employer]  Pager: [phone]       Again, thank you for allowing me to participate in the care of your patient.        Sincerely,        Rajan Ospina DO    Electronically signed

## 2025-02-19 ENCOUNTER — OFFICE VISIT (OUTPATIENT)
Dept: FAMILY MEDICINE | Facility: CLINIC | Age: 60
End: 2025-02-19
Payer: COMMERCIAL

## 2025-02-19 VITALS
SYSTOLIC BLOOD PRESSURE: 122 MMHG | RESPIRATION RATE: 18 BRPM | HEIGHT: 60 IN | DIASTOLIC BLOOD PRESSURE: 66 MMHG | TEMPERATURE: 98.4 F | OXYGEN SATURATION: 98 % | BODY MASS INDEX: 23.81 KG/M2 | WEIGHT: 121.3 LBS | HEART RATE: 66 BPM

## 2025-02-19 DIAGNOSIS — Z87.891 HISTORY OF TOBACCO USE, PRESENTING HAZARDS TO HEALTH: ICD-10-CM

## 2025-02-19 DIAGNOSIS — M77.11 LATERAL EPICONDYLITIS OF RIGHT ELBOW: ICD-10-CM

## 2025-02-19 DIAGNOSIS — K35.201 ACUTE APPENDICITIS WITH PERFORATION AND GENERALIZED PERITONITIS, WITHOUT ABSCESS OR GANGRENE: ICD-10-CM

## 2025-02-19 DIAGNOSIS — Z09 HOSPITAL DISCHARGE FOLLOW-UP: Primary | ICD-10-CM

## 2025-02-19 DIAGNOSIS — D62 ANEMIA DUE TO BLOOD LOSS, ACUTE: ICD-10-CM

## 2025-02-19 LAB
ERYTHROCYTE [DISTWIDTH] IN BLOOD BY AUTOMATED COUNT: 15.8 % (ref 10–15)
HCT VFR BLD AUTO: 32.7 % (ref 35–47)
HGB BLD-MCNC: 10.4 G/DL (ref 11.7–15.7)
MCH RBC QN AUTO: 20.8 PG (ref 26.5–33)
MCHC RBC AUTO-ENTMCNC: 31.8 G/DL (ref 31.5–36.5)
MCV RBC AUTO: 65 FL (ref 78–100)
PLATELET # BLD AUTO: 505 10E3/UL (ref 150–450)
RBC # BLD AUTO: 5.01 10E6/UL (ref 3.8–5.2)
WBC # BLD AUTO: 12 10E3/UL (ref 4–11)

## 2025-02-19 PROCEDURE — 85027 COMPLETE CBC AUTOMATED: CPT | Performed by: NURSE PRACTITIONER

## 2025-02-19 PROCEDURE — 36415 COLL VENOUS BLD VENIPUNCTURE: CPT | Performed by: NURSE PRACTITIONER

## 2025-02-19 NOTE — PROGRESS NOTES
Assessment & Plan     Hospital discharge follow-up  Acute appendicitis with perforation and generalized peritonitis, without abscess or gangrene  Patient presents today for hospital follow-up after patient had to have emergency surgery for appendicitis with perforation and generalized peritonitis.  Patient's drains have been removed and incisions appear well-approximated without infection today however patient does have decreased bowel sounds and abdominal tenderness throughout.  Suggest continuation of advancing diet slowly await avoiding foods that increase bloating or cause abdominal pain.  Bowel movements are starting to become more normal.  Patient to reach out to me if not seeing improvement in a week or sooner if symptoms worsen      History of tobacco use, presenting hazards to health   patient did quit smoking sometime ago likely over 15 years however she stopped using nicotine patches 2 years ago  Prior CT shows lung RADS category 2, will repeat today and if stable will likely discontinue low-dose CT for screening of lung cancer  - CT Chest Lung Cancer Scrn Low Dose wo    Lateral epicondylitis of right elbow  Patient with right elbow pain consistent with lateral epicondylitis -discussed conservative therapy  RICE and NSAIDs  Home exercise program  Patient to reach out if needing referral for sports medicine or PT    Anemia due to acute blood loss  Significant improvement seen on CBC from 9.1, 13 days ago to 10.4  Today patient continues to have fatigue  - CBC with platelets  - CBC with platelets      MED REC REQUIRED  Post Medication Reconciliation Status:  Discharge medications reconciled and changed, see notes/orders        Refugio Cordero is a 59 year old, presenting for the following health issues:  Hospital F/U        2/19/2025     1:53 PM   Additional Questions   Roomed by prabha uribe   Accompanied by self     HPI     Date of Admission:  2/3/2025  Date of Discharge:  2/6/2025  Discharging Provider:  Timothy Trinidad MD  Discharge Service: Hospitalist Service        Discharge Diagnoses  Acute appendicitis with perforation and generalized peritonitis, without abscess    Pathology shows gangrenous appendicitis   S/p laparoscopic appendectomy   Acute blood loss anemia, stable   hypOtension, improved  Leukocytosis, downtrending  History of thalassemia  History of Hurthle cell adenoma  History of thyroid lobectomy  Vaginal atrophy  History of nephrolithiasi    Reviewed hospital discharge note as well as ED note  Reviewed surgeons note from 2/11/2025   Reviewed labs from hospital admission    Patient's bowels appear to be improving per patient -continues to have some abdominal discomfort and pain -no longer using any pain medication and only used opioid at at night for 4 nights.  Back on a normal diet at this time.    Feeling very fatigued feels too tired to exercise at the end of the day    Experiencing elbow pain for some time feels its arthritis              Review of Systems  Constitutional, HEENT, cardiovascular, pulmonary, gi and gu systems are negative, except as otherwise noted.      Objective    /66 (BP Location: Right arm, Patient Position: Sitting, Cuff Size: Adult Regular)   Pulse 66   Temp 98.4  F (36.9  C) (Oral)   Resp 18   Ht 1.524 m (5')   Wt 55 kg (121 lb 4.8 oz)   LMP  (LMP Unknown)   SpO2 98%   BMI 23.69 kg/m    Body mass index is 23.69 kg/m .  Physical Exam   GENERAL: alert and no distress  NECK: no adenopathy, no asymmetry, masses, or scars  RESP: lungs clear to auscultation - no rales, rhonchi or wheezes  CV: regular rate and rhythm, normal S1 S2, no S3 or S4, no murmur, click or rub, no peripheral edema  ABDOMEN: soft, generalized tenderness throughout the abdominal cavity worse in left and right lower lower quadrant, no hepatosplenomegaly, no masses and hypoactive bowel sounds in all 4 quadrants  MS: Right elbow pain on the lateral epicondyle on palpation, significant pain with  resistance during supination            Signed Electronically by: JOSE Renteria CNP

## 2025-02-19 NOTE — PROGRESS NOTES
Date of Admission:  2/3/2025  Date of Discharge:  2/6/2025  Discharging Provider: Timothy Trinidad MD  Discharge Service: Hospitalist Service        Discharge Diagnoses  Acute appendicitis with perforation and generalized peritonitis, without abscess    Pathology shows gangrenous appendicitis   S/p laparoscopic appendectomy   Acute blood loss anemia, stable   hypOtension, improved  Leukocytosis, downtrending  History of thalassemia  History of Hurthle cell adenoma  History of thyroid lobectomy  Vaginal atrophy  History of nephrolithiasi    The following labs/tests are recommended: cbc and ensure general surgery follow-up to remove JUSTINE drain.

## 2025-02-20 ENCOUNTER — TELEPHONE (OUTPATIENT)
Dept: FAMILY MEDICINE | Facility: CLINIC | Age: 60
End: 2025-02-20
Payer: COMMERCIAL

## 2025-02-20 NOTE — RESULT ENCOUNTER NOTE
Reese Cordero,  We are seeing great improvement in your hemoglobin and just a couple weeks.  You were 9.1 and now 10.4.  Please continue to advance your diet as tolerated and prioritize iron rich foods as it does show you have low iron based on the size and color of the cell itself.  Additionally your platelet count is significantly high.  This can happen after surgery.  Please be sure that you have a high amount of hydration -at least 96 ounces a day to help dilute your blood, and lower your risk of clots.  Avoid sedentary periods of longer than 1 hour at a time.  Your white blood cell count is slightly high indicating that you are continuing to heal.  I would like you to repeat these labs in another 2 weeks.  I am placing a standing order for this.     Please let me know if you have any questions or concerns.    Thank you for trusting us with your care. It was a pleasure seeing you.  JOSE Renteria CNP on 2/19/2025 at 6:36 PM

## 2025-02-20 NOTE — TELEPHONE ENCOUNTER
Outgoing call to patient to relay provider message. No answer, [facility]    ----- Message from Shantelle Ornelas sent at 2/19/2025  6:37 PM CST -----  Reese Cordero,  We are seeing great improvement in your hemoglobin and just a couple weeks.  You were 9.1 and now 10.4.  Please continue to advance your diet as tolerated and prioritize iron rich foods as it does show you have low iron based on the size and color of the cell itself.  Additionally your platelet count is significantly high.  This can happen after surgery.  Please be sure that you have a high amount of hydration -at least 96 ounces a day to help dilute your blood, and lower your risk of clots.  Avoid sedentary periods of longer than 1 hour at a time.  Your white blood cell count is slightly high indicating that you are continuing to heal.  I would like you to repeat these labs in another 2 weeks.  I am placing a standing order for this.     Please let me know if you have any questions or concerns.    Thank you for trusting us with your care. It was a pleasure seeing you.  JOSE Renteria CNP on 2/19/2025 at 6:36 PM

## 2025-03-05 ENCOUNTER — LAB (OUTPATIENT)
Dept: LAB | Facility: CLINIC | Age: 60
End: 2025-03-05
Payer: COMMERCIAL

## 2025-03-05 DIAGNOSIS — D62 ANEMIA DUE TO BLOOD LOSS, ACUTE: ICD-10-CM

## 2025-03-05 LAB
BASOPHILS # BLD AUTO: 0 10E3/UL (ref 0–0.2)
BASOPHILS NFR BLD AUTO: 0 %
EOSINOPHIL # BLD AUTO: 0.2 10E3/UL (ref 0–0.7)
EOSINOPHIL NFR BLD AUTO: 3 %
ERYTHROCYTE [DISTWIDTH] IN BLOOD BY AUTOMATED COUNT: 16.9 % (ref 10–15)
HCT VFR BLD AUTO: 35.8 % (ref 35–47)
HGB BLD-MCNC: 11.5 G/DL (ref 11.7–15.7)
IMM GRANULOCYTES # BLD: 0 10E3/UL
IMM GRANULOCYTES NFR BLD: 0 %
LYMPHOCYTES # BLD AUTO: 3 10E3/UL (ref 0.8–5.3)
LYMPHOCYTES NFR BLD AUTO: 41 %
MCH RBC QN AUTO: 20.9 PG (ref 26.5–33)
MCHC RBC AUTO-ENTMCNC: 32.1 G/DL (ref 31.5–36.5)
MCV RBC AUTO: 65 FL (ref 78–100)
MONOCYTES # BLD AUTO: 0.7 10E3/UL (ref 0–1.3)
MONOCYTES NFR BLD AUTO: 10 %
NEUTROPHILS # BLD AUTO: 3.4 10E3/UL (ref 1.6–8.3)
NEUTROPHILS NFR BLD AUTO: 46 %
NRBC # BLD AUTO: 0 10E3/UL
NRBC BLD AUTO-RTO: 0 /100
PLATELET # BLD AUTO: 171 10E3/UL (ref 150–450)
RBC # BLD AUTO: 5.49 10E6/UL (ref 3.8–5.2)
WBC # BLD AUTO: 7.4 10E3/UL (ref 4–11)

## 2025-03-05 PROCEDURE — 85025 COMPLETE CBC W/AUTO DIFF WBC: CPT

## 2025-03-05 PROCEDURE — 36415 COLL VENOUS BLD VENIPUNCTURE: CPT

## 2025-03-19 ENCOUNTER — MYC MEDICAL ADVICE (OUTPATIENT)
Dept: FAMILY MEDICINE | Facility: CLINIC | Age: 60
End: 2025-03-19
Payer: COMMERCIAL

## 2025-03-19 DIAGNOSIS — M77.11 LATERAL EPICONDYLITIS OF RIGHT ELBOW: Primary | ICD-10-CM

## 2025-03-19 NOTE — TELEPHONE ENCOUNTER
LOV 2/19/25  Lateral epicondylitis of right elbow  Patient with right elbow pain consistent with lateral epicondylitis -discussed conservative therapy  RICE and NSAIDs  Home exercise program  Patient to reach out if needing referral for sports medicine or PT

## 2025-03-25 ENCOUNTER — PATIENT OUTREACH (OUTPATIENT)
Dept: CARE COORDINATION | Facility: CLINIC | Age: 60
End: 2025-03-25
Payer: COMMERCIAL

## 2025-03-25 ASSESSMENT — ACTIVITIES OF DAILY LIVING (ADL)
CLEANING: A LITTLE BIT OF DIFFICULTY
PREPARING_FOOD: NO DIFFICULTY
DRESS: NO DIFFICULTY
LAUNDERING_CLOTHES: NO DIFFICULTY
SLEEPING: A LITTLE BIT OF DIFFICULTY
WASHING_YOUR_HAIR_OR_SCALP: NO DIFFICULTY
PUSHING_UP_ON_YOUR_HANDS: EXTREME DIFFICULTY
LIFTING_A_BAG_OF_GROCERIES_TO_WAIST_LEVEL: A LITTLE BIT OF DIFFICULTY
PLACING_AN_OBJECT_ONTO,_OR_REMOVING_IT_FROM_AN_OVERHEAD_SHELF: A LITTLE BIT OF DIFFICULTY
PLEASE_INDICATE_YOR_PRIMARY_REASON_FOR_REFERRAL_TO_THERAPY:: ELBOW
VACUUMING,_SWEEPING,_OR_RAKING: MODERATE DIFFICULTY
CARRYING_A_SMALL_SUITCASE_WITH_YOUR_AFFECTED_LIMB: MODERATE DIFFICULTY
TYING_OR_LACING_SHOES: NO DIFFICULTY
DRIVING: A LITTLE BIT OF DIFFICULTY
ANY_OF_YOUR_USUAL_WORK,_HOUSEWORK_OR_SCHOOL_ACTIVITIES: NO DIFFICULTY
OPENING_DOORS: NO DIFFICULTY
UEFI_TOTAL_SCORE/80: .69
USING_TOOLS_OR_APPLIANCES: A LITTLE BIT OF DIFFICULTY
UEFI_TOTAL_SCORE: 55
OPENING_A_JAR: MODERATE DIFFICULTY
THROWING_A_BALL: MODERATE DIFFICULTY
YOUR_USUAL_HOBBIES,_RECREATIONAL_OR_SPORTING_ACTIVITIES: QUITE A BIT OF DIFFICULTY
DOING_UP_BUTTONS: NO DIFFICULTY

## 2025-03-26 ENCOUNTER — THERAPY VISIT (OUTPATIENT)
Dept: PHYSICAL THERAPY | Facility: REHABILITATION | Age: 60
End: 2025-03-26
Attending: NURSE PRACTITIONER
Payer: COMMERCIAL

## 2025-03-26 DIAGNOSIS — R29.898 RIGHT ARM WEAKNESS: ICD-10-CM

## 2025-03-26 DIAGNOSIS — M77.11 RIGHT LATERAL EPICONDYLITIS: Primary | ICD-10-CM

## 2025-03-26 DIAGNOSIS — M77.11 LATERAL EPICONDYLITIS OF RIGHT ELBOW: ICD-10-CM

## 2025-03-26 PROCEDURE — 97161 PT EVAL LOW COMPLEX 20 MIN: CPT | Mod: GP | Performed by: PHYSICAL THERAPIST

## 2025-03-26 PROCEDURE — 97110 THERAPEUTIC EXERCISES: CPT | Mod: GP | Performed by: PHYSICAL THERAPIST

## 2025-03-26 NOTE — PROGRESS NOTES
PHYSICAL THERAPY EVALUATION  Type of Visit: Evaluation     Fall Risk Screen:  Fall screen completed by: PT  Have you fallen 2 or more times in the past year?: No  Have you fallen and had an injury in the past year?: No  Is patient a fall risk?: No    Subjective         Presenting condition or subjective complaint: Tennis elbow rt arm  Pt reports that in late November she woke up one day with R elbow pain and stiffness.  She does not recall an event or incident where she felt pain.  She reports that she does weight lifting and she has been having a lot of pain with lifting - especially with biceps and triceps. She has learned some wrist flexor and extensor stretches off the internet and has been doing them a couple times/day.  The pt reports that she weight lifts every other day - using free weights and some equipments.  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  She has increased pain at night - rolling over in bed.  She also feels more pain with sudden movements.  She did not do a week of NSAIDS 2x/day and it helped some.  She has iced a few times and doesn't really like ice.   Pain is localized at the elbow and does not radiate and no numbness and tingling.       Date of onset:      Relevant medical history: Anemia; Arthritis; Menopause   Dates & types of surgery: 2/3/2025 ruptured appendix, thyroid in distant past. See epic    Prior diagnostic imaging/testing results:       Prior therapy history for the same diagnosis, illness or injury: No      Prior Level of Function  Transfers: Independent  Ambulation: Independent  ADL: Independent    Living Environment  Social support: Alone   Type of home: [facility]   Stairs to enter the home: Yes 8 Is there a railing: Yes     Ramp: No   Stairs inside the home: Yes 8 Is there a railing: Yes     Help at home: None  Equipment owned:       Employment: Yes Manager  Hobbies/Interests: Weight training, hiking    Patient goals for therapy: Weight training    Pain  assessment: Pain present     Objective   PAIN: Pain Quality: Burning, Dull, and Exhausting    INTEGUMENTARY (edema, incisions): WNL    POSTURE: Sitting Posture: Rounded shoulders, Forward head    ROM:   (Degrees) Left AROM Left PROM Right AROM Right PROM   Elbow Flexion WNL  WNL, min p    Elbow Extension WNL  WNL, min p    Wrist Flexion WNL  WNL    Wrist Extension WNL  WNL, min p    Supination WNL  WNL, min p    Pronation WNL  WNL    Radial Deviation WNL  WNL    Ulnar Deviation WNL  WNL                                  STRENGTH:   Pain: - none + mild ++ moderate +++ severe  Strength Scale: 0-5/5 Left Right   Elbow Flexion 5 4+, pain    Elbow Extension 5 4+    Wrist Flexion 5 4+   Wrist Extension 5 4, P!   Supination 5 4, pain    Pronation 5 4, pain    Ulnar Deviation 5 4, pain    Radial Deviation 5 4+    Strength (lbs) 46LBS, NO PAIN  19 LBS increased pain    Lateral Pinch (lbs)     3 Point Pinch (lbs)          Hand Dominance: Right    SPECIAL TESTS:    Left Right   Valgus 0 - -   Valgus 30 - -   Moving Valgus Stress - -   Varus Stress - -   Elbow Flexion to Extension Test - +   Resisted finger extension  - +        PALPATION:  increased tightness and tenderness over the R lateral epicondyle and into the wrist extensors>flexors.  Also tightness and tenderness into the biceps     JOINT MOBILITY:  min decrease at R elbow   CERVICAL SCREEN: WNL      Assessment & Plan   CLINICAL IMPRESSIONS  Medical Diagnosis: Lateral epicondylitis of right elbow    Treatment Diagnosis: R elbow pain and weakness   Impression/Assessment:  Pt presents to PT with an onset of R elbow pain starting in November due to lateral epicondylitis with no known mechanism.  The pt has full R elbow ROM with pain into supination and end range extension.  She has considerable R UE weakness as well as decreased R  strength.  The pt has increased tenderness over the R lateral epicondyle and into the R wrist extensors>flexors.  She also has positive  "testing for lateral epicondylitis.  She will benefit from PT to address these impairments identified in evaluation.     Clinical Decision Making (Complexity):  Clinical Presentation: Stable/Uncomplicated  Clinical Presentation Rationale: based on medical and personal factors listed in PT evaluation  Clinical Decision Making (Complexity): Low complexity    PLAN OF CARE  Treatment Interventions:  Modalities: Cryotherapy, Dry Needling, E-stim, Hot Pack, Iontophoresis  Interventions: Manual Therapy, Neuromuscular Re-education, Therapeutic Activity, Therapeutic Exercise, Self-Care/Home Management    Long Term Goals     PT Goal 1  Goal Identifier: [place]  Goal Description: Pt will demonstrate readiness for independent sx management and HEP  Rationale: to maximize safety and independence within the home  Target Date: 06/23/25  PT Goal 2  Goal Identifier:  Strength  Goal Description: Pt will increase her R  strength to >/=46 lb avg and pain </=3/10  Rationale: to maximize safety and independence with performance of ADLs and functional tasks  Target Date: 06/23/25  PT Goal 3  Goal Identifier: Sleeping  Goal Description: Pt will be able to sleep >/=75% of the night without waking from R elbow pain for improved quality of sleep  Target Date: 06/23/25  PT Goal 4  Goal Identifier: Weight Lifting  Goal Description: Pt will be able to return to her \"normal\" weight lifting regimen including weights and reps with R elbow pain </=3/10  Rationale: to maximize safety and independence with performance of ADLs and functional tasks  Target Date: 06/23/25      Frequency of Treatment: 1x/week  Duration of Treatment: 90 days    Recommended Referrals to Other Professionals: Occupational Therapy if pain does not subside     Education Assessment:   Learner/Method: Patient;Demonstration;Pictures/Video  Education Comments: Pt educated on POC, pathology, etc    Risks and benefits of evaluation/treatment have been explained. "   Patient/Family/caregiver agrees with Plan of Care.     Evaluation Time:       Signing Clinician: Argentina Mcdonald PT

## 2025-04-09 ENCOUNTER — THERAPY VISIT (OUTPATIENT)
Dept: PHYSICAL THERAPY | Facility: REHABILITATION | Age: 60
End: 2025-04-09
Payer: COMMERCIAL

## 2025-04-09 DIAGNOSIS — R29.898 RIGHT ARM WEAKNESS: ICD-10-CM

## 2025-04-09 DIAGNOSIS — M77.11 RIGHT LATERAL EPICONDYLITIS: Primary | ICD-10-CM

## 2025-04-09 PROCEDURE — 97110 THERAPEUTIC EXERCISES: CPT | Mod: GP | Performed by: PHYSICAL THERAPIST

## 2025-04-09 PROCEDURE — 97140 MANUAL THERAPY 1/> REGIONS: CPT | Mod: GP | Performed by: PHYSICAL THERAPIST

## 2025-04-23 ENCOUNTER — THERAPY VISIT (OUTPATIENT)
Dept: PHYSICAL THERAPY | Facility: REHABILITATION | Age: 60
End: 2025-04-23
Payer: COMMERCIAL

## 2025-04-23 DIAGNOSIS — R29.898 RIGHT ARM WEAKNESS: ICD-10-CM

## 2025-04-23 DIAGNOSIS — M77.11 RIGHT LATERAL EPICONDYLITIS: Primary | ICD-10-CM

## 2025-04-23 PROCEDURE — 97110 THERAPEUTIC EXERCISES: CPT | Mod: GP | Performed by: PHYSICAL THERAPIST

## 2025-04-23 PROCEDURE — 97140 MANUAL THERAPY 1/> REGIONS: CPT | Mod: GP | Performed by: PHYSICAL THERAPIST

## 2025-04-30 ENCOUNTER — THERAPY VISIT (OUTPATIENT)
Dept: PHYSICAL THERAPY | Facility: REHABILITATION | Age: 60
End: 2025-04-30
Payer: COMMERCIAL

## 2025-04-30 DIAGNOSIS — R29.898 RIGHT ARM WEAKNESS: ICD-10-CM

## 2025-04-30 DIAGNOSIS — M77.11 RIGHT LATERAL EPICONDYLITIS: Primary | ICD-10-CM

## 2025-04-30 PROCEDURE — 97140 MANUAL THERAPY 1/> REGIONS: CPT | Mod: GP | Performed by: PHYSICAL THERAPIST

## 2025-04-30 PROCEDURE — 97110 THERAPEUTIC EXERCISES: CPT | Mod: GP | Performed by: PHYSICAL THERAPIST

## 2025-05-07 ENCOUNTER — HOSPITAL ENCOUNTER (OUTPATIENT)
Dept: CT IMAGING | Facility: HOSPITAL | Age: 60
Discharge: HOME | End: 2025-05-07
Attending: NURSE PRACTITIONER
Payer: COMMERCIAL

## 2025-05-07 ENCOUNTER — HOSPITAL ENCOUNTER (OUTPATIENT)
Dept: BONE DENSITY | Facility: HOSPITAL | Age: 60
Discharge: HOME | End: 2025-05-07
Attending: NURSE PRACTITIONER
Payer: COMMERCIAL

## 2025-05-07 DIAGNOSIS — Z87.891 HISTORY OF TOBACCO USE, PRESENTING HAZARDS TO HEALTH: ICD-10-CM

## 2025-05-07 DIAGNOSIS — M81.8 IDIOPATHIC OSTEOPOROSIS: ICD-10-CM

## 2025-05-07 PROCEDURE — 71271 CT THORAX LUNG CANCER SCR C-: CPT

## 2025-05-07 PROCEDURE — 77091 TBS TECHL CALCULATION ONLY: CPT

## 2025-05-12 ENCOUNTER — RESULTS FOLLOW-UP (OUTPATIENT)
Dept: FAMILY MEDICINE | Facility: CLINIC | Age: 60
End: 2025-05-12

## 2025-05-12 DIAGNOSIS — M81.8 IDIOPATHIC OSTEOPOROSIS: Primary | ICD-10-CM

## 2025-05-12 NOTE — RESULT ENCOUNTER NOTE
Reese Cordero,     We are seeing an increase in your lumbar spine since 2023 which is great as this was your weakest area.  We are seeing a decrease in density in your hips but this score is still ok. I propose repeating the DEXA scan in 1 calendar year and consider restarting then.

## 2025-05-28 ENCOUNTER — THERAPY VISIT (OUTPATIENT)
Dept: PHYSICAL THERAPY | Facility: REHABILITATION | Age: 60
End: 2025-05-28
Payer: COMMERCIAL

## 2025-05-28 DIAGNOSIS — M77.11 RIGHT LATERAL EPICONDYLITIS: Primary | ICD-10-CM

## 2025-05-28 DIAGNOSIS — R29.898 RIGHT ARM WEAKNESS: ICD-10-CM

## 2025-05-28 DIAGNOSIS — M62.81 GENERALIZED MUSCLE WEAKNESS: ICD-10-CM

## 2025-05-28 PROCEDURE — 97110 THERAPEUTIC EXERCISES: CPT | Mod: GP | Performed by: PHYSICAL THERAPIST

## 2025-05-28 PROCEDURE — 97140 MANUAL THERAPY 1/> REGIONS: CPT | Mod: GP | Performed by: PHYSICAL THERAPIST

## 2025-06-10 ENCOUNTER — THERAPY VISIT (OUTPATIENT)
Dept: PHYSICAL THERAPY | Facility: REHABILITATION | Age: 60
End: 2025-06-10
Payer: COMMERCIAL

## 2025-06-10 DIAGNOSIS — R29.898 RIGHT ARM WEAKNESS: ICD-10-CM

## 2025-06-10 DIAGNOSIS — M77.11 RIGHT LATERAL EPICONDYLITIS: Primary | ICD-10-CM

## 2025-06-10 PROCEDURE — 97140 MANUAL THERAPY 1/> REGIONS: CPT | Mod: GP | Performed by: PHYSICAL THERAPIST

## 2025-06-10 PROCEDURE — 97110 THERAPEUTIC EXERCISES: CPT | Mod: GP | Performed by: PHYSICAL THERAPIST

## 2025-07-09 ENCOUNTER — THERAPY VISIT (OUTPATIENT)
Dept: PHYSICAL THERAPY | Facility: REHABILITATION | Age: 60
End: 2025-07-09
Payer: COMMERCIAL

## 2025-07-09 ENCOUNTER — OFFICE VISIT (OUTPATIENT)
Dept: FAMILY MEDICINE | Facility: CLINIC | Age: 60
End: 2025-07-09
Payer: COMMERCIAL

## 2025-07-09 VITALS
SYSTOLIC BLOOD PRESSURE: 120 MMHG | BODY MASS INDEX: 24.54 KG/M2 | DIASTOLIC BLOOD PRESSURE: 60 MMHG | WEIGHT: 125 LBS | TEMPERATURE: 98.4 F | OXYGEN SATURATION: 98 % | RESPIRATION RATE: 20 BRPM | HEIGHT: 60 IN | HEART RATE: 59 BPM

## 2025-07-09 DIAGNOSIS — R31.29 MICROSCOPIC HEMATURIA: ICD-10-CM

## 2025-07-09 DIAGNOSIS — M25.552 HIP PAIN, LEFT: ICD-10-CM

## 2025-07-09 DIAGNOSIS — B37.31 VULVAL CANDIDIASIS: ICD-10-CM

## 2025-07-09 DIAGNOSIS — N90.4 LICHEN SCLEROSUS OF VULVA: Primary | ICD-10-CM

## 2025-07-09 DIAGNOSIS — M77.11 RIGHT LATERAL EPICONDYLITIS: Primary | ICD-10-CM

## 2025-07-09 DIAGNOSIS — N32.89 BLADDER SPASM: ICD-10-CM

## 2025-07-09 DIAGNOSIS — L29.2 VULVAR ITCHING: ICD-10-CM

## 2025-07-09 DIAGNOSIS — R29.898 RIGHT ARM WEAKNESS: ICD-10-CM

## 2025-07-09 DIAGNOSIS — N39.3 FEMALE STRESS INCONTINENCE: ICD-10-CM

## 2025-07-09 LAB
ALBUMIN UR-MCNC: NEGATIVE MG/DL
APPEARANCE UR: CLEAR
BACTERIA #/AREA URNS HPF: ABNORMAL /HPF
BILIRUB UR QL STRIP: NEGATIVE
CLUE CELLS: ABNORMAL
COLOR UR AUTO: YELLOW
GLUCOSE UR STRIP-MCNC: NEGATIVE MG/DL
HGB UR QL STRIP: ABNORMAL
KETONES UR STRIP-MCNC: NEGATIVE MG/DL
LEUKOCYTE ESTERASE UR QL STRIP: NEGATIVE
NITRATE UR QL: NEGATIVE
PH UR STRIP: 7 [PH] (ref 5–8)
RBC #/AREA URNS AUTO: ABNORMAL /HPF
SP GR UR STRIP: 1.01 (ref 1–1.03)
SQUAMOUS #/AREA URNS AUTO: ABNORMAL /LPF
TRICHOMONAS, WET PREP: ABNORMAL
UROBILINOGEN UR STRIP-ACNC: 0.2 E.U./DL
WBC #/AREA URNS AUTO: ABNORMAL /HPF
WBC CLUMPS #/AREA URNS HPF: ABNORMAL /HPF
WBC'S/HIGH POWER FIELD, WET PREP: ABNORMAL
YEAST #/AREA URNS HPF: ABNORMAL /HPF
YEAST, WET PREP: PRESENT

## 2025-07-09 PROCEDURE — 97140 MANUAL THERAPY 1/> REGIONS: CPT | Mod: GP | Performed by: PHYSICAL THERAPIST

## 2025-07-09 PROCEDURE — 87210 SMEAR WET MOUNT SALINE/INK: CPT | Performed by: NURSE PRACTITIONER

## 2025-07-09 PROCEDURE — 3074F SYST BP LT 130 MM HG: CPT | Performed by: NURSE PRACTITIONER

## 2025-07-09 PROCEDURE — 3078F DIAST BP <80 MM HG: CPT | Performed by: NURSE PRACTITIONER

## 2025-07-09 PROCEDURE — 81001 URINALYSIS AUTO W/SCOPE: CPT | Performed by: NURSE PRACTITIONER

## 2025-07-09 PROCEDURE — 97110 THERAPEUTIC EXERCISES: CPT | Mod: GP | Performed by: PHYSICAL THERAPIST

## 2025-07-09 PROCEDURE — 99215 OFFICE O/P EST HI 40 MIN: CPT | Performed by: NURSE PRACTITIONER

## 2025-07-09 RX ORDER — CLOTRIMAZOLE 1 %
CREAM (GRAM) TOPICAL 2 TIMES DAILY
Qty: 30 G | Refills: 0 | Status: SHIPPED | OUTPATIENT
Start: 2025-07-09

## 2025-07-09 RX ORDER — CLOTRIMAZOLE 1 %
CREAM (GRAM) TOPICAL 2 TIMES DAILY
Qty: 28 G | Refills: 0 | Status: SHIPPED | OUTPATIENT
Start: 2025-07-09 | End: 2025-07-09

## 2025-07-09 RX ORDER — OXYBUTYNIN CHLORIDE 5 MG/1
5-10 TABLET, EXTENDED RELEASE ORAL DAILY
Qty: 30 TABLET | Refills: 0 | Status: SHIPPED | OUTPATIENT
Start: 2025-07-09

## 2025-07-09 RX ORDER — FLUCONAZOLE 150 MG/1
TABLET ORAL
Qty: 2 TABLET | Refills: 0 | Status: SHIPPED | OUTPATIENT
Start: 2025-07-09

## 2025-07-09 RX ORDER — CLOBETASOL PROPIONATE 0.5 MG/G
OINTMENT TOPICAL 2 TIMES DAILY
Qty: 45 G | Refills: 1 | Status: SHIPPED | OUTPATIENT
Start: 2025-07-09 | End: 2025-07-09

## 2025-07-09 NOTE — PROGRESS NOTES
Assessment & Plan     Vulvar itching  Vulvar candidiasis  Ddx: Lichen sclerosus of vulva Vs severe yeast infection - testing with wet prep reveals yeast, will treat for severe possible yeast infection.  If after 1 to 2 weeks patient still having significant symptoms then I would suggest treatment with super-potent steroid daily-clobetasol ointment for 12 weeks and then taper to 2-3 times a week -patient instructed to complete an e-visit if not seeing any improvement after treatment of yeast infection  Plan for follow-up in person in 3 months or sooner as needed  - Wet prep - Clinic Collect  Clotrimazole twice daily , Diflucan 150 mg x 2 doses       female stress incontinence  Bladder spasm  Will check UA for possible UTI -on physical exam the urethra was not erythemic or swollen, otherwise normal internal genital exam   UA today came back with hematuria and RBCs -of note she had this earlier back in February when it was not looked into at that time, we will repeat this in another week. -If microscopic hematuria continues with RBCs we will complete a CT scan to check for renal stones.  If CT scan is negative we will do a referral to urology    if next UA is negative for hematuria and for UTI and continues to have significant symptoms, will start oxybutynin at 5 mg daily then increase to 10 mg if not seeing any improvement.  Patient to complete an e-visit if needing a higher dose than 10 mg after 3 weeks-patient will need a refill prior to her next follow-up with me in 3 months  - UA Macroscopic with reflex to Microscopic and Culture - Lab Collect  - Wet prep - Clinic Collect  - oxyBUTYnin ER (DITROPAN XL) 5 MG 24 hr tablet  Dispense: 30 tablet; Refill: 0  - UA Macroscopic with reflex to Microscopic and Culture - Lab Collect  - UA Microscopic with Reflex to Culture  - oxyBUTYnin ER (DITROPAN XL) 5 MG 24 hr tablet  Dispense: 30 tablet; Refill: 0    Hip pain, left  Discussed differential diagnosis of IT band syndrome  versus trochanteric bursitis.  Information given on both at home exercises and stretches to do at home  Apply ice liberally  May use over-the-counter NSAIDs or acetaminophen for pain management with food.  - Patient to notify me if not seeing any improvement of her symptoms with HEP -will do a hip x-ray and consider trochanteric bursitis injection in that event    - fluconazole (DIFLUCAN) 150 MG tablet; Please take 1 tablet orally by mouth, then take another 1 after 3 days.  Dispense: 2 tablet; Refill: 0    - fluconazole (DIFLUCAN) 150 MG tablet; Please take 1 tablet orally by mouth, then take another 1 after 3 days.  Dispense: 2 tablet; Refill: 0     Microscopic hematuria    - UA Macroscopic with reflex to Microscopic and Culture - Lab Collect; Future    Instructions given to patient: Today's plan:   Positive yeast on wet prep today:  Plan:  Start clotrimazole cream -applied to the vulvar and itchy areas outside of the vagina twice a day for 7 to 10 days.    Take Diflucan 150 mg once, then another single dose 72 hours later    If you do not see any improvement please complete an e-visit describing your symptoms and effect of the medication if any.  In this event we will start clobetasol for empiric treatment of lichen sclerosis    Blood in the urine/stress incontinence  Next week complete a repeat urine analysis-if UA at that time reveals blood and RBCs I will order a CT scan to see if there are any kidney stones or abnormalities that can be seen in the region of the bladder    Hold oxybutynin for now    Spent 45mins doing chart review, history and exam, patient education, documentation and further activities per the note.        Refugio Cordero is a 60 year old, presenting for the following health issues:  Vaginal Problem (Vaginal itchy only on the outside 2 mos. Left hip pain. Urinating when laughing going on for 2 mos. )        7/9/2025    12:57 PM   Additional Questions   Roomed by GRACE LOPEZ   Accompanied by  "SELF     Vaginal Problem     History of Present Illness       Reason for visit:  Vag itching, stress incontinence, left hip pain  Symptom onset:  More than a month  Symptoms include:  Itching in labia, bladder leakage with sneezing, upper leg pain while exiting cat  Symptom intensity:  Moderate  Symptom progression:  Worsening  Had these symptoms before:  No  What makes it better:  Hydrocortisone for itching   She is taking medications regularly.      - STRESS INCONTINENCE  Happened twice starting May     Having hip pain left hip - worse when abduction - feels it \"grabs\" not liming activity     Severe vaginal itching after restarting the estradiol cream. - previously didn't have any reactions to estradiol cream - itching has been constant for 2 months. Waxes and wanes, constantly aware of this. Tried hydrocortisone 10% OTC.  No new sexual activity, no condoms, last cream application was two days ago - using 3 times/week                        Objective    /60 (BP Location: Right arm, Patient Position: Sitting, Cuff Size: Adult Regular)   Pulse 59   Temp 98.4  F (36.9  C) (Oral)   Resp 20   Ht 1.524 m (5')   Wt 56.7 kg (125 lb)   LMP  (LMP Unknown)   SpO2 98%   BMI 24.41 kg/m    Body mass index is 24.41 kg/m .  Physical Exam     : BL seam of labia minor and majora with hyperkaratosis,  no fissuing and seam of white scarred tissue, labia themselves are pink/erythremic with patchy white areas, there is some mild white milky discharge                  Signed Electronically by: JOSE Renteria CNP    "

## 2025-07-10 NOTE — PATIENT INSTRUCTIONS
Today's plan:   Positive yeast on wet prep today:  Plan:  Start clotrimazole cream -applied to the vulvar and itchy areas outside of the vagina twice a day for 7 to 10 days.    Take Diflucan 150 mg once, then another single dose 72 hours later    If you do not see any improvement please complete an e-visit describing your symptoms and effect of the medication if any.  In this event we will start clobetasol for empiric treatment of lichen sclerosis    Blood in the urine/stress incontinence  Next week complete a repeat urine analysis-if UA at that time reveals blood and RBCs I will order a CT scan to see if there are any kidney stones or abnormalities that can be seen in the region of the bladder    Hold oxybutynin for now

## 2025-07-16 ENCOUNTER — LAB (OUTPATIENT)
Dept: LAB | Facility: CLINIC | Age: 60
End: 2025-07-16
Payer: COMMERCIAL

## 2025-07-16 DIAGNOSIS — R31.29 MICROSCOPIC HEMATURIA: ICD-10-CM

## 2025-07-16 DIAGNOSIS — N39.3 FEMALE STRESS INCONTINENCE: ICD-10-CM

## 2025-07-16 LAB
ALBUMIN UR-MCNC: NEGATIVE MG/DL
APPEARANCE UR: CLEAR
BILIRUB UR QL STRIP: NEGATIVE
COLOR UR AUTO: YELLOW
GLUCOSE UR STRIP-MCNC: NEGATIVE MG/DL
HGB UR QL STRIP: ABNORMAL
KETONES UR STRIP-MCNC: NEGATIVE MG/DL
LEUKOCYTE ESTERASE UR QL STRIP: NEGATIVE
NITRATE UR QL: NEGATIVE
PH UR STRIP: 7 [PH] (ref 5–8)
RBC #/AREA URNS AUTO: ABNORMAL /HPF
SP GR UR STRIP: 1.01 (ref 1–1.03)
SQUAMOUS #/AREA URNS AUTO: ABNORMAL /LPF
UROBILINOGEN UR STRIP-ACNC: 0.2 E.U./DL
WBC #/AREA URNS AUTO: ABNORMAL /HPF

## 2025-07-16 PROCEDURE — 81001 URINALYSIS AUTO W/SCOPE: CPT

## 2025-07-18 ENCOUNTER — MYC MEDICAL ADVICE (OUTPATIENT)
Dept: FAMILY MEDICINE | Facility: CLINIC | Age: 60
End: 2025-07-18
Payer: COMMERCIAL

## 2025-07-18 DIAGNOSIS — R31.29 MICROSCOPIC HEMATURIA: Primary | ICD-10-CM

## 2025-07-18 NOTE — TELEPHONE ENCOUNTER
LOV 7/9/25   Microscopic hematuria     - UA Macroscopic with reflex to Microscopic and Culture - Lab Collect; Future     Instructions given to patient: Today's plan:   Positive yeast on wet prep today:  Plan:  Start clotrimazole cream -applied to the vulvar and itchy areas outside of the vagina twice a day for 7 to 10 days.     Take Diflucan 150 mg once, then another single dose 72 hours later     If you do not see any improvement please complete an e-visit describing your symptoms and effect of the medication if any.  In this event we will start clobetasol for empiric treatment of lichen sclerosis     Blood in the urine/stress incontinence  Next week complete a repeat urine analysis-if UA at that time reveals blood and RBCs I will order a CT scan to see if there are any kidney stones or abnormalities that can be seen in the region of the bladder     Hold oxybutynin for now

## 2025-07-24 ENCOUNTER — PATIENT OUTREACH (OUTPATIENT)
Dept: CARE COORDINATION | Facility: CLINIC | Age: 60
End: 2025-07-24
Payer: COMMERCIAL

## 2025-07-28 ENCOUNTER — PATIENT OUTREACH (OUTPATIENT)
Dept: CARE COORDINATION | Facility: CLINIC | Age: 60
End: 2025-07-28
Payer: COMMERCIAL

## 2025-07-30 ENCOUNTER — HOSPITAL ENCOUNTER (OUTPATIENT)
Dept: CT IMAGING | Facility: HOSPITAL | Age: 60
Discharge: HOME | End: 2025-07-30
Attending: NURSE PRACTITIONER
Payer: COMMERCIAL

## 2025-07-30 DIAGNOSIS — R31.29 MICROSCOPIC HEMATURIA: ICD-10-CM

## 2025-07-30 PROCEDURE — 250N000011 HC RX IP 250 OP 636: Performed by: NURSE PRACTITIONER

## 2025-07-30 PROCEDURE — 250N000009 HC RX 250: Performed by: NURSE PRACTITIONER

## 2025-07-30 PROCEDURE — 74178 CT ABD&PLV WO CNTR FLWD CNTR: CPT

## 2025-07-30 RX ORDER — IOPAMIDOL 755 MG/ML
90 INJECTION, SOLUTION INTRAVASCULAR ONCE
Status: COMPLETED | OUTPATIENT
Start: 2025-07-30 | End: 2025-07-30

## 2025-07-30 RX ADMIN — IOPAMIDOL 90 ML: 755 INJECTION, SOLUTION INTRAVENOUS at 15:08

## 2025-07-30 RX ADMIN — SODIUM CHLORIDE 100 ML: 9 INJECTION, SOLUTION INTRAVENOUS at 15:09

## 2025-08-21 ENCOUNTER — OFFICE VISIT (OUTPATIENT)
Dept: UROLOGY | Facility: CLINIC | Age: 60
End: 2025-08-21
Attending: NURSE PRACTITIONER
Payer: COMMERCIAL

## 2025-08-21 VITALS — DIASTOLIC BLOOD PRESSURE: 61 MMHG | SYSTOLIC BLOOD PRESSURE: 133 MMHG | HEART RATE: 60 BPM | TEMPERATURE: 98.4 F

## 2025-08-21 DIAGNOSIS — R31.29 MICROSCOPIC HEMATURIA: ICD-10-CM

## 2025-08-21 DIAGNOSIS — R31.29 MICROHEMATURIA: ICD-10-CM

## 2025-08-21 LAB
ALBUMIN UR-MCNC: NEGATIVE MG/DL
APPEARANCE UR: CLEAR
BACTERIA #/AREA URNS HPF: NORMAL /HPF
BILIRUB UR QL STRIP: NEGATIVE
COLOR UR AUTO: YELLOW
GLUCOSE UR STRIP-MCNC: NEGATIVE MG/DL
HGB UR QL STRIP: ABNORMAL
KETONES UR STRIP-MCNC: NEGATIVE MG/DL
LEUKOCYTE ESTERASE UR QL STRIP: NEGATIVE
NITRATE UR QL: NEGATIVE
PH UR STRIP: 7 [PH] (ref 5–8)
RBC #/AREA URNS AUTO: NORMAL /HPF
SP GR UR STRIP: <=1.005 (ref 1–1.03)
SQUAMOUS #/AREA URNS AUTO: NORMAL /LPF
UROBILINOGEN UR STRIP-ACNC: 0.2 E.U./DL
WBC #/AREA URNS AUTO: NORMAL /HPF

## (undated) DEVICE — ESU CORD MONOPOLAR 10'  E0510

## (undated) DEVICE — STPL ENDO RELOAD 45X3.5MM 6R45B

## (undated) DEVICE — SUTURE VICRYL+ 0 27IN CT-1 UND VCP260H

## (undated) DEVICE — VIAL DECANTER STERILE WHITE DYNJDEC06

## (undated) DEVICE — ELECTRODE PATIENT RETURN ADULT L10 FT 2 PLATE CORD 0855C

## (undated) DEVICE — SOL WATER IRRIG 1000ML BOTTLE 2F7114

## (undated) DEVICE — ENDO POUCH UNIV RETRIEVAL SYSTEM INZII 10MM CD001

## (undated) DEVICE — BLADE KNIFE SURG 11 371111

## (undated) DEVICE — DRAIN BLAKE 19FR SIL 2231

## (undated) DEVICE — SUCTION MANIFOLD NEPTUNE 2 SYS 1 PORT 702-025-000

## (undated) DEVICE — STPL ENDO LINEAR CUT ARTICULATING 45MM ATS45

## (undated) DEVICE — SUTURE VICRYL+ 3-0 18IN PS-2 UND VCP497H

## (undated) DEVICE — SU DERMABOND ADVANCED .7ML DNX12

## (undated) DEVICE — SOL NACL 0.9% IRRIG 1000ML BOTTLE 2F7124

## (undated) DEVICE — NDL INSUFFLATION 13GA 120MM C2201

## (undated) DEVICE — PREP CHLORAPREP 26ML TINTED HI-LITE ORANGE 930815

## (undated) DEVICE — TUBING SMOKE EVAC PNEUMOCLEAR HIGH FLOW 0620050250

## (undated) DEVICE — ENDO TROCAR SLEEVE KII Z-THREADED 05X100MM CTS02

## (undated) DEVICE — ESU LIGASURE MARYLAND LAPAROSCOPIC SLR/DVDR 5MMX37CM LF1937

## (undated) DEVICE — DRAIN RESERVOIR 100ML JP 0070740

## (undated) DEVICE — ENDO TROCAR FIRST ENTRY KII FIOS Z-THRD 05X100MM CTF03

## (undated) DEVICE — SUTURE PASSOR W/GUIDE RSG-14F-4-WG

## (undated) DEVICE — GLOVE UNDER INDICATOR PI SZ 7.0 LF 41670

## (undated) DEVICE — ENDO FORCEP ENDOJAW BIOPSY 3.7MMX230CM FB-222U

## (undated) DEVICE — CUSTOM PACK LAP CHOLE SBA5BLCHEA

## (undated) DEVICE — ENDO TROCAR FIRST ENTRY KII FIOS Z-THRD 12X100MM CTF73

## (undated) RX ORDER — FENTANYL CITRATE-0.9 % NACL/PF 10 MCG/ML
PLASTIC BAG, INJECTION (ML) INTRAVENOUS
Status: DISPENSED
Start: 2025-02-03

## (undated) RX ORDER — FENTANYL CITRATE 50 UG/ML
INJECTION, SOLUTION INTRAMUSCULAR; INTRAVENOUS
Status: DISPENSED
Start: 2025-02-03

## (undated) RX ORDER — EPHEDRINE SULFATE 50 MG/ML
INJECTION, SOLUTION INTRAMUSCULAR; INTRAVENOUS; SUBCUTANEOUS
Status: DISPENSED
Start: 2025-02-03

## (undated) RX ORDER — KETOROLAC TROMETHAMINE 30 MG/ML
INJECTION, SOLUTION INTRAMUSCULAR; INTRAVENOUS
Status: DISPENSED
Start: 2025-02-03